# Patient Record
Sex: FEMALE | Race: WHITE | NOT HISPANIC OR LATINO | Employment: FULL TIME | URBAN - METROPOLITAN AREA
[De-identification: names, ages, dates, MRNs, and addresses within clinical notes are randomized per-mention and may not be internally consistent; named-entity substitution may affect disease eponyms.]

---

## 2023-11-22 ENCOUNTER — HOSPITAL ENCOUNTER (OUTPATIENT)
Dept: RADIOLOGY | Facility: HOSPITAL | Age: 53
Discharge: HOME | End: 2023-11-22
Payer: COMMERCIAL

## 2023-11-22 DIAGNOSIS — E78.2 MIXED HYPERLIPIDEMIA: ICD-10-CM

## 2023-11-22 PROCEDURE — 75571 CT HRT W/O DYE W/CA TEST: CPT

## 2023-12-21 ENCOUNTER — ANCILLARY PROCEDURE (OUTPATIENT)
Dept: RADIOLOGY | Facility: HOSPITAL | Age: 53
End: 2023-12-21
Payer: COMMERCIAL

## 2023-12-21 DIAGNOSIS — E04.1 NONTOXIC SINGLE THYROID NODULE: ICD-10-CM

## 2023-12-21 PROCEDURE — 76536 US EXAM OF HEAD AND NECK: CPT

## 2023-12-21 PROCEDURE — 76536 US EXAM OF HEAD AND NECK: CPT | Performed by: STUDENT IN AN ORGANIZED HEALTH CARE EDUCATION/TRAINING PROGRAM

## 2024-01-10 ENCOUNTER — HOSPITAL ENCOUNTER (OUTPATIENT)
Dept: RADIOLOGY | Facility: HOSPITAL | Age: 54
Discharge: HOME | End: 2024-01-10
Payer: COMMERCIAL

## 2024-01-10 DIAGNOSIS — Z12.31 ENCOUNTER FOR SCREENING MAMMOGRAM FOR MALIGNANT NEOPLASM OF BREAST: ICD-10-CM

## 2024-01-10 PROCEDURE — 77063 BREAST TOMOSYNTHESIS BI: CPT | Performed by: STUDENT IN AN ORGANIZED HEALTH CARE EDUCATION/TRAINING PROGRAM

## 2024-01-10 PROCEDURE — 77067 SCR MAMMO BI INCL CAD: CPT | Performed by: STUDENT IN AN ORGANIZED HEALTH CARE EDUCATION/TRAINING PROGRAM

## 2024-01-10 PROCEDURE — 77067 SCR MAMMO BI INCL CAD: CPT

## 2024-01-17 ENCOUNTER — OFFICE VISIT (OUTPATIENT)
Dept: OTOLARYNGOLOGY | Facility: CLINIC | Age: 54
End: 2024-01-17
Payer: COMMERCIAL

## 2024-01-17 VITALS — HEIGHT: 60 IN | WEIGHT: 124 LBS | BODY MASS INDEX: 24.35 KG/M2

## 2024-01-17 DIAGNOSIS — E04.1 THYROID NODULE: Primary | ICD-10-CM

## 2024-01-17 DIAGNOSIS — K21.9 LARYNGOPHARYNGEAL REFLUX: ICD-10-CM

## 2024-01-17 PROCEDURE — 99214 OFFICE O/P EST MOD 30 MIN: CPT | Performed by: OTOLARYNGOLOGY

## 2024-01-17 PROCEDURE — 1036F TOBACCO NON-USER: CPT | Performed by: OTOLARYNGOLOGY

## 2024-01-17 RX ORDER — DEXTROMETHORPHAN HYDROBROMIDE, GUAIFENESIN 5; 100 MG/5ML; MG/5ML
650 LIQUID ORAL EVERY 8 HOURS PRN
COMMUNITY

## 2024-01-17 RX ORDER — ACETAMINOPHEN, DIPHENHYDRAMINE HCL, PHENYLEPHRINE HCL 325; 25; 5 MG/1; MG/1; MG/1
TABLET ORAL
COMMUNITY

## 2024-01-17 RX ORDER — OMEPRAZOLE 40 MG/1
40 CAPSULE, DELAYED RELEASE ORAL
COMMUNITY

## 2024-01-17 NOTE — PROGRESS NOTES
HPI  Odsesa Thompson is a 54 y.o. female follow-up thyroid nodules and reflux.  She is taking her antacid and doing well with this.  She had ultrasound in July with stable nodules.  Recommended another ultrasound in 1 year.  She has no specific symptoms today.      History reviewed. No pertinent past medical history.         Medications:     Current Outpatient Medications:     acetaminophen (Tylenol 8 HOUR) 650 mg ER tablet, Take 1 tablet (650 mg) by mouth every 8 hours if needed for mild pain (1 - 3). Do not crush, chew, or split., Disp: , Rfl:     melatonin 10 mg tablet, Take by mouth., Disp: , Rfl:     omeprazole (PriLOSEC) 40 mg DR capsule, Take 1 capsule (40 mg) by mouth. Do not crush or chew., Disp: , Rfl:      Allergies:  Not on File     Physical Exam:  Last Recorded Vitals  Height 1.524 m (5'), weight 56.2 kg (124 lb).  General:     General appearance: Well-developed, well-nourished in no acute distress.       Voice:  normal       Head/face: Normal appearance; nontender to palpation     Facial nerve function: Normal and symmetric bilaterally.    Oral/oropharynx:     Oral vestibule: Normal labial and gingival mucosa     Tongue/floor of mouth: Normal without lesion     Oropharynx: Clear.  No lesions present of the hard/soft palate, posterior pharynx    Neck:     Neck: Normal appearance, trachea midline     Salivary glands: Normal to palpation bilaterally     Lymph nodes: No cervical lymphadenopathy to palpation     Thyroid: No thyromegaly.  No palpable nodules     Range of motion: Normal    Neurological:     Cortical functions: Alert and oriented x3, appropriate affect       Larynx/hypopharynx:     Laryngeal findings: Mirror exam inadequate or limited secondary to enlarged base of tongue and/or excessive gagging    Ear:     Ear canal: Normal bilaterally     Tympanic membrane: Intact and mobile bilaterally     Pinna: Normal bilaterally     Hearing:  Gross hearing assessment normal by voice    Nose:      Visualized using: Anterior rhinoscopy     Nasopharynx: Inadequate mirror exam secondary to gag, anatomy.       Nasal dorsum: Nontraumatic midline appearance     Septum: Midline     Inferior turbinates: Normally sized     Mucosa: Bilateral, pink, normal appearing       Assessment/Plan   We will repeat ultrasound in July.  Follow-up with her after that.  She will continue her antacid in the meantime         Kenneth Alanis MD

## 2024-07-10 ENCOUNTER — TELEPHONE (OUTPATIENT)
Dept: OTOLARYNGOLOGY | Facility: CLINIC | Age: 54
End: 2024-07-10
Payer: COMMERCIAL

## 2024-07-10 DIAGNOSIS — E04.1 THYROID NODULE: Primary | ICD-10-CM

## 2024-07-12 ENCOUNTER — HOSPITAL ENCOUNTER (OUTPATIENT)
Dept: RADIOLOGY | Facility: HOSPITAL | Age: 54
Discharge: HOME | End: 2024-07-12
Payer: COMMERCIAL

## 2024-07-12 DIAGNOSIS — E04.1 THYROID NODULE: ICD-10-CM

## 2024-07-12 PROCEDURE — 76536 US EXAM OF HEAD AND NECK: CPT

## 2024-07-17 ENCOUNTER — APPOINTMENT (OUTPATIENT)
Dept: OTOLARYNGOLOGY | Facility: CLINIC | Age: 54
End: 2024-07-17
Payer: COMMERCIAL

## 2024-07-17 VITALS — BODY MASS INDEX: 27.29 KG/M2 | TEMPERATURE: 96.8 F | WEIGHT: 139 LBS | HEIGHT: 60 IN

## 2024-07-17 DIAGNOSIS — K21.9 LARYNGOPHARYNGEAL REFLUX: ICD-10-CM

## 2024-07-17 DIAGNOSIS — E04.1 THYROID NODULE: Primary | ICD-10-CM

## 2024-07-17 PROCEDURE — 31575 DIAGNOSTIC LARYNGOSCOPY: CPT | Performed by: OTOLARYNGOLOGY

## 2024-07-17 PROCEDURE — 1036F TOBACCO NON-USER: CPT | Performed by: OTOLARYNGOLOGY

## 2024-07-17 PROCEDURE — 3008F BODY MASS INDEX DOCD: CPT | Performed by: OTOLARYNGOLOGY

## 2024-07-17 PROCEDURE — 99214 OFFICE O/P EST MOD 30 MIN: CPT | Performed by: OTOLARYNGOLOGY

## 2024-07-17 RX ORDER — MULTIVITAMIN/IRON/FOLIC ACID 18MG-0.4MG
1 TABLET ORAL DAILY
COMMUNITY

## 2024-07-17 RX ORDER — BISMUTH SUBSALICYLATE 262 MG
1 TABLET,CHEWABLE ORAL DAILY
COMMUNITY

## 2024-07-17 RX ORDER — CALCIUM CARBONATE 500(1250)
1 TABLET,CHEWABLE ORAL DAILY
COMMUNITY

## 2024-07-17 ASSESSMENT — PATIENT HEALTH QUESTIONNAIRE - PHQ9
2. FEELING DOWN, DEPRESSED OR HOPELESS: NOT AT ALL
SUM OF ALL RESPONSES TO PHQ9 QUESTIONS 1 & 2: 0
1. LITTLE INTEREST OR PLEASURE IN DOING THINGS: NOT AT ALL

## 2024-07-17 NOTE — PROGRESS NOTES
HPI  Odessa Thompson is a 54 y.o. female follow-up thyroid nodules and reflux.  She is taking her antacid and doing well with this.  She has some occasional breakthrough symptoms.  She had an ultrasound done July 10 demonstrating multiple nodules.  There was some mild growth in her right inferior nodule.  It remains less than 1.5 cm.  TI RADS 4.        SIZE:  RIGHT LOBE:  5.4 x 1.6 x 2.5 cm  LEFT LOBE:  5.3 x 1.4 x 1.9 cm  ISTHMUS:  5 mm in AP diameter      NODULES: (Please note, assessment and description of nodules is per  TI-RADS criteria. Up to 4 total nodules described, which includes  largest and/or most clinically significant based on morphology.) It  is noted that some spongiform and/or cystic nodules may not be  specifically described and are TR category 1 (benign).      NODULE #: 1.      Location: Right thyroid lobe inferior aspect  Size: 1.3 x 0.9 x 0.8 cm  Composition:  Solid or almost completely solid (2)  Echogenicity:  Hyperechoic or isoechoic (1)  Shape:  Wider-than-tall (0)  Margin:  Smooth (0)  Echogenic Foci:  None or Large comet-tail artifacts (0)      If present previously:  Significant change in size (>/= 20% diameter increase in at least  two dimensions and minimal increase of 2mm?): No significant change.  Change in features or ACR TI-RADS category: No change.      The total score of this nodule is 3 points, corresponding to a  TI-RADS category  3; (3 points) Mildly suspicious.      NODULE #: 2.      Location: Right thyroid lobe inferior aspect  Size: 1.2 x 0.7 x 1 cm compared to 0.8 x 0.6 x 0.8 cm on prior  Composition: Solid or almost completely solid (2)  Echogenicity: Hypoechoic (2)  Shape:  Wider-than-tall (0)  Margin:  Smooth (0)  Echogenic Foci: None or Large comet-tail artifacts (0)      If present previously:  Significant change in size (>/= 20% diameter increase in at least  two dimensions and minimal increase of 2mm?): Yes, increased. Change  in features or ACR TI-RADS category:  No change.      The total score of this nodule is 4 points, corresponding to a  TI-RADS category 4; (4-6 points) Moderately suspicious.      NODULE #: 3.      Location: Left thyroid lobe mid aspect  Size: 1 x 0.9 x 1.1 cm compared to 1 x 0.7 x 0.9 cm on prior  Composition: Solid or almost completely solid (2)  Echogenicity: Hyperechoic or isoechoic (1)  Shape:  Wider-than-tall (0)  Margin:  Smooth (0)  Echogenic Foci: None or Large comet-tail artifacts (0)      If present previously:  Significant change in size (>/= 20% diameter increase in at least  two dimensions and minimal increase of 2mm?): No significant change.  Change in features or ACR TI-RADS category: No change.      The total score of this nodule is 3 points, corresponding to a  TI-RADS category 3; (3 points) Mildly suspicious.      NODULE #: 4.      Location: Thyroid isthmus  Size: 0.9 x 0.5 x 1 cm compared to 0.8 x 0.4 x 0.7 cm on prior.  Composition: Solid or almost completely solid (2)  Echogenicity: Hypoechoic (2)  Shape:  Wider-than-tall (0)  Margin:  Smooth (0)  Echogenic Foci: None or Large comet-tail artifacts (0)      If present previously:  Significant change in size (>/= 20% diameter increase in at least  two dimensions and minimal increase of 2mm?): No significant change.  Change in features or ACR TI-RADS category: No change.      The total score of this nodule is 4 points, corresponding to a  TI-RADS category 4; (4-6 points) Moderately suspicious.      IMPRESSION:  1. Enlarged heterogeneous thyroid containing multiple varying sized  nodules as detailed most compatible with multinodular goiter.  Continued surveillance may be considered.      History reviewed. No pertinent past medical history.         Medications:     Current Outpatient Medications:     acetaminophen (Tylenol 8 HOUR) 650 mg ER tablet, Take 1 tablet (650 mg) by mouth every 8 hours if needed for mild pain (1 - 3). Do not crush, chew, or split., Disp: , Rfl:     b complex 0.4  mg tablet, Take 1 tablet by mouth once daily., Disp: , Rfl:     melatonin 10 mg tablet, Take by mouth., Disp: , Rfl:     multivitamin tablet, Take 1 tablet by mouth once daily., Disp: , Rfl:     omeprazole (PriLOSEC) 40 mg DR capsule, Take 1 capsule (40 mg) by mouth. Do not crush or chew., Disp: , Rfl:     calcium carbonate 500 mg calcium (1,250 mg) chewable tablet, Chew 1 tablet (1,250 mg) once daily., Disp: , Rfl:      Allergies:  Allergies   Allergen Reactions    Adhesive Tape-Silicones Swelling        Physical Exam:  Last Recorded Vitals  Temperature 36 °C (96.8 °F), height 1.524 m (5'), weight 63 kg (139 lb).  General:     General appearance: Well-developed, well-nourished in no acute distress.       Voice:  normal       Head/face: Normal appearance; nontender to palpation     Facial nerve function: Normal and symmetric bilaterally.    Oral/oropharynx:     Oral vestibule: Normal labial and gingival mucosa     Tongue/floor of mouth: Normal without lesion     Oropharynx: Clear.  No lesions present of the hard/soft palate, posterior pharynx    Neck:     Neck: Normal appearance, trachea midline     Salivary glands: Normal to palpation bilaterally     Lymph nodes: No cervical lymphadenopathy to palpation     Thyroid: No thyromegaly.  No palpable nodules     Range of motion: Normal    Neurological:     Cortical functions: Alert and oriented x3, appropriate affect       Larynx/hypopharynx:     Laryngeal findings: Mirror exam inadequate or limited secondary to enlarged base of tongue and/or excessive gagging.  Flexible laryngoscopy performed secondary to inadequate mirror examination.  Verbal informed consent the flexible laryngoscope was passed through the nasal cavity.  Normal epiglottis, aryepiglottic folds, arytenoids, false vocal cords, true vocal cords.  Normal vocal cord mobility bilaterally was identified.  No mucosal masses or lesions.    Nasopharynx:     Nasopharynx: Normal mild growth of one of the thyroid  nodules.  It remains fairly small.  Multinodular    Ear:     Ear canal: Normal bilaterally     Tympanic membrane: Intact and mobile bilaterally     Pinna: Normal bilaterally     Hearing:  Gross hearing assessment normal by voice    Nose:     Visualized using: Anterior rhinoscopy     Nasopharynx: Inadequate mirror exam secondary to gag, anatomy.       Nasal dorsum: Nontraumatic midline appearance     Septum: Midline     Inferior turbinates: Normally sized     Mucosa: Bilateral, pink, normal appearing       Assessment/Plan   Multinodular disease.  Mild growth of the nodule as above but remains fairly small.  We will continue to monitor.  Recheck ultrasound 1 year.  Recommend continue antacid.  Reassured flexible laryngoscopy without lesion         Kenneth Alanis MD

## 2024-12-03 ENCOUNTER — HOSPITAL ENCOUNTER (OUTPATIENT)
Dept: RADIOLOGY | Facility: HOSPITAL | Age: 54
Discharge: HOME | End: 2024-12-03
Payer: COMMERCIAL

## 2024-12-03 DIAGNOSIS — M25.562 PAIN IN LEFT KNEE: ICD-10-CM

## 2024-12-03 PROCEDURE — 73562 X-RAY EXAM OF KNEE 3: CPT | Mod: LT

## 2024-12-03 PROCEDURE — 73562 X-RAY EXAM OF KNEE 3: CPT | Mod: LEFT SIDE | Performed by: RADIOLOGY

## 2024-12-06 ENCOUNTER — OFFICE VISIT (OUTPATIENT)
Dept: ORTHOPEDIC SURGERY | Facility: CLINIC | Age: 54
End: 2024-12-06
Payer: COMMERCIAL

## 2024-12-06 VITALS — HEIGHT: 60 IN | WEIGHT: 138 LBS | BODY MASS INDEX: 27.09 KG/M2

## 2024-12-06 DIAGNOSIS — M25.562 PAIN IN LEFT KNEE: ICD-10-CM

## 2024-12-06 DIAGNOSIS — M17.12 UNILATERAL PRIMARY OSTEOARTHRITIS, LEFT KNEE: ICD-10-CM

## 2024-12-06 DIAGNOSIS — M21.162 VARUS DEFORMITY, NOT ELSEWHERE CLASSIFIED, LEFT KNEE: ICD-10-CM

## 2024-12-06 PROCEDURE — 3008F BODY MASS INDEX DOCD: CPT | Performed by: ORTHOPAEDIC SURGERY

## 2024-12-06 PROCEDURE — 20610 DRAIN/INJ JOINT/BURSA W/O US: CPT | Performed by: ORTHOPAEDIC SURGERY

## 2024-12-06 PROCEDURE — 99204 OFFICE O/P NEW MOD 45 MIN: CPT | Performed by: ORTHOPAEDIC SURGERY

## 2024-12-06 PROCEDURE — 1036F TOBACCO NON-USER: CPT | Performed by: ORTHOPAEDIC SURGERY

## 2024-12-06 RX ORDER — TRIAMCINOLONE ACETONIDE 40 MG/ML
1 INJECTION, SUSPENSION INTRA-ARTICULAR; INTRAMUSCULAR
Status: COMPLETED | OUTPATIENT
Start: 2024-12-06 | End: 2024-12-06

## 2024-12-06 ASSESSMENT — PAIN - FUNCTIONAL ASSESSMENT: PAIN_FUNCTIONAL_ASSESSMENT: 0-10

## 2024-12-06 ASSESSMENT — PAIN SCALES - GENERAL: PAINLEVEL_OUTOF10: 8

## 2024-12-06 NOTE — LETTER
December 6, 2024     Deb Raymond MD  [address] [place] [id number]    Patient: Odessa Thompson   YOB: 1970   Date of Visit: 12/6/2024       Dear Dr. Deb Raymond MD:    Thank you for referring Odessa Thompson to me for evaluation. Below are my notes for this consultation.  If you have questions, please do not hesitate to call me. I look forward to following your patient along with you.       Sincerely,     Lowell Nicolas MD      CC: Deb Raymond MD  ______________________________________________________________________________________    This is a consultation from Dr. Deb Raymond MD for   Chief Complaint   Patient presents with   • Left Knee - Pain       This is a 54 y.o. female who presents for evaluation of left knee pain.  Patient states has had left knee pain for years, this is a chronic issue but is recently exacerbated.  She complains of sharp pain over the medial knee worse with walking proving with rest.  No numbness or tingling no fevers or chills no shooting pain down the leg.  She has never had surgery or injections.  She does take over-the-counter anti-inflammatories but they are not helping much.  She is tried ice and topicals.    Physical Exam    There has been no interval change in this patient's past medical, surgical, medications, allergies, family history or social history since the most recent visit to a provider within our department. 14 point review of systems was performed, reviewed, and negative except for pertinent positives documented in the history of present illness.     Constitutional: well developed, well nourished female in no acute distress  Psychiatric: normal mood, appropriate affect  Eyes: sclera anicteric  HENT: normocephalic/atraumatic  CV: regular rate and rhythm   Respiratory: non labored breathing  Integumentary: no rash  Neurological: moves all extremities    Left knee exam: skin intact no lacerations or abrations. no effusion.   Tender medial joint line. negative log roll negative patellar grind. ROM 0-120. stable to varus and valgus stress at 0 and 30 degrees. negative lachman negative posterior drawer negative zeus. 5/5 ehl/fhl/gs/ta. silt s/s/sp/dp/t. 2+ dp/pt        Xrays were ordered by me, they were reviewed and independently interpreted by me today, they show severe degenerative changes bone-on-bone arthritis varus deformity left knee    L Inj/Asp: L knee on 12/6/2024 10:23 AM  Indications: pain and joint swelling  Details: 22 G needle, anterolateral approach  Medications: 1 mL triamcinolone acetonide 40 mg/mL    Discussion:  I discussed the conservative treatment options for knee osteoarthritis including but not limited to physical therapy, oral NSAIDS, activity and lifestyle modification, and corticosteroid injections. Pt has elected to undergo a cortisone injection today. I have explained the risk and benefits of an injection including the possibility of joint infection, bleeding, damage to cartilage, allergic reaction. Patient verbalized understanding and gave verbal consent wishes to proceed with a intra-articular cortisone injection for their knee.    Procedure:  After discussing the risk and benefits of the procedure, we proceeded with an intra-articular left knee injection. We discussed the risks and benefits and potential morbidity related to the treatment, and to the prescription medication administered in the injection    With the patient's informed verbal consent, the left knee was prepped in standard sterile fashion with Chlorhexidine. The skin was then anesthetized with ethyl chloride spray and cleaned again with Chlorhexidine. The knee was then apirated/injected with a prefilled 20-gauge syringe of 40 mg Kenalog + 4 ml Lidocaine using the lateral approach without complications.  The patient tolerated this well and felt immediate initial relief of symptoms. A bandaid was applied and the patient ambulated out of the  clinic on ther own accord without difficulty. Patient was instructed to avoid physical activity for 24-48 hours to prevent the knees from swelling and may ice the knees as tolerated. Patient should contact the office if any signs of of infection appear: redness, fever, chills, drainage, swelling or warmth to the knees.  Pt understands that the injections can be repeated no sooner than 3 months.  Procedure, treatment alternatives, risks and benefits explained, specific risks discussed. Consent was given by the patient. Immediately prior to procedure a time out was called to verify the correct patient, procedure, equipment, support staff and site/side marked as required. Patient was prepped and draped in the usual sterile fashion.             Impression/Plan: This is a 54 y.o. female with severe left knee arthritis.  I had an in depth discussion with the patient regarding treatment options for arthritis and their relative risks and benefits. We reviewed surgical and nonsurgical option for treatment. Treatments include anti inflammatory medications, physical therapy, weight loss, activity modification, use of assistive devices, injection therapies. We discussed current prescriptions and risks and benefits of continuation of prescription medication as apporpriate. We discussed that arthritis is often progressive over time, an in end stage arthritis surgical interventions can be considered, including arthroplasty. All questions were answered and the patient voiced their understanding.  May be a good candidate for arthroplasty in the future, I will see her back as needed    BMI Readings from Last 1 Encounters:   12/06/24 26.95 kg/m²      Lab Results   Component Value Date    CREATININE 0.7 06/03/2023     Tobacco Use: Low Risk  (12/6/2024)    Patient History    • Smoking Tobacco Use: Never    • Smokeless Tobacco Use: Never    • Passive Exposure: Not on file      Computed MELD 3.0 unavailable. One or more values for this  "score either were not found within the given timeframe or did not fit some other criterion.  Computed MELD-Na unavailable. One or more values for this score either were not found within the given timeframe or did not fit some other criterion.       Lab Results   Component Value Date    HGBA1C 6.7 (H) 02/27/2022     No results found for: \"STAPHMRSASCR\"  "

## 2024-12-06 NOTE — PROGRESS NOTES
This is a consultation from Dr. Deb Raymond MD for   Chief Complaint   Patient presents with    Left Knee - Pain       This is a 54 y.o. female who presents for evaluation of left knee pain.  Patient states has had left knee pain for years, this is a chronic issue but is recently exacerbated.  She complains of sharp pain over the medial knee worse with walking proving with rest.  No numbness or tingling no fevers or chills no shooting pain down the leg.  She has never had surgery or injections.  She does take over-the-counter anti-inflammatories but they are not helping much.  She is tried ice and topicals.    Physical Exam    There has been no interval change in this patient's past medical, surgical, medications, allergies, family history or social history since the most recent visit to a provider within our department. 14 point review of systems was performed, reviewed, and negative except for pertinent positives documented in the history of present illness.     Constitutional: well developed, well nourished female in no acute distress  Psychiatric: normal mood, appropriate affect  Eyes: sclera anicteric  HENT: normocephalic/atraumatic  CV: regular rate and rhythm   Respiratory: non labored breathing  Integumentary: no rash  Neurological: moves all extremities    Left knee exam: skin intact no lacerations or abrations. no effusion.  Tender medial joint line. negative log roll negative patellar grind. ROM 0-120. stable to varus and valgus stress at 0 and 30 degrees. negative lachman negative posterior drawer negative zeus. 5/5 ehl/fhl/gs/ta. silt s/s/sp/dp/t. 2+ dp/pt        Xrays were ordered by me, they were reviewed and independently interpreted by me today, they show severe degenerative changes bone-on-bone arthritis varus deformity left knee    L Inj/Asp: L knee on 12/6/2024 10:23 AM  Indications: pain and joint swelling  Details: 22 G needle, anterolateral approach  Medications: 1 mL triamcinolone  acetonide 40 mg/mL    Discussion:  I discussed the conservative treatment options for knee osteoarthritis including but not limited to physical therapy, oral NSAIDS, activity and lifestyle modification, and corticosteroid injections. Pt has elected to undergo a cortisone injection today. I have explained the risk and benefits of an injection including the possibility of joint infection, bleeding, damage to cartilage, allergic reaction. Patient verbalized understanding and gave verbal consent wishes to proceed with a intra-articular cortisone injection for their knee.    Procedure:  After discussing the risk and benefits of the procedure, we proceeded with an intra-articular left knee injection. We discussed the risks and benefits and potential morbidity related to the treatment, and to the prescription medication administered in the injection    With the patient's informed verbal consent, the left knee was prepped in standard sterile fashion with Chlorhexidine. The skin was then anesthetized with ethyl chloride spray and cleaned again with Chlorhexidine. The knee was then apirated/injected with a prefilled 20-gauge syringe of 40 mg Kenalog + 4 ml Lidocaine using the lateral approach without complications.  The patient tolerated this well and felt immediate initial relief of symptoms. A bandaid was applied and the patient ambulated out of the clinic on ther own accord without difficulty. Patient was instructed to avoid physical activity for 24-48 hours to prevent the knees from swelling and may ice the knees as tolerated. Patient should contact the office if any signs of of infection appear: redness, fever, chills, drainage, swelling or warmth to the knees.  Pt understands that the injections can be repeated no sooner than 3 months.  Procedure, treatment alternatives, risks and benefits explained, specific risks discussed. Consent was given by the patient. Immediately prior to procedure a time out was called to  "verify the correct patient, procedure, equipment, support staff and site/side marked as required. Patient was prepped and draped in the usual sterile fashion.             Impression/Plan: This is a 54 y.o. female with severe left knee arthritis.  I had an in depth discussion with the patient regarding treatment options for arthritis and their relative risks and benefits. We reviewed surgical and nonsurgical option for treatment. Treatments include anti inflammatory medications, physical therapy, weight loss, activity modification, use of assistive devices, injection therapies. We discussed current prescriptions and risks and benefits of continuation of prescription medication as apporpriate. We discussed that arthritis is often progressive over time, an in end stage arthritis surgical interventions can be considered, including arthroplasty. All questions were answered and the patient voiced their understanding.  May be a good candidate for arthroplasty in the future, I will see her back as needed    BMI Readings from Last 1 Encounters:   12/06/24 26.95 kg/m²      Lab Results   Component Value Date    CREATININE 0.7 06/03/2023     Tobacco Use: Low Risk  (12/6/2024)    Patient History     Smoking Tobacco Use: Never     Smokeless Tobacco Use: Never     Passive Exposure: Not on file      Computed MELD 3.0 unavailable. One or more values for this score either were not found within the given timeframe or did not fit some other criterion.  Computed MELD-Na unavailable. One or more values for this score either were not found within the given timeframe or did not fit some other criterion.       Lab Results   Component Value Date    HGBA1C 6.7 (H) 02/27/2022     No results found for: \"STAPHMRSASCR\"  "

## 2024-12-10 ENCOUNTER — APPOINTMENT (OUTPATIENT)
Dept: ORTHOPEDIC SURGERY | Facility: CLINIC | Age: 54
End: 2024-12-10
Payer: COMMERCIAL

## 2024-12-16 ENCOUNTER — APPOINTMENT (OUTPATIENT)
Dept: RADIOLOGY | Facility: HOSPITAL | Age: 54
End: 2024-12-16
Payer: COMMERCIAL

## 2024-12-16 ENCOUNTER — APPOINTMENT (OUTPATIENT)
Dept: CARDIOLOGY | Facility: HOSPITAL | Age: 54
End: 2024-12-16
Payer: COMMERCIAL

## 2024-12-16 ENCOUNTER — HOSPITAL ENCOUNTER (EMERGENCY)
Facility: HOSPITAL | Age: 54
Discharge: HOME | End: 2024-12-17
Attending: STUDENT IN AN ORGANIZED HEALTH CARE EDUCATION/TRAINING PROGRAM
Payer: COMMERCIAL

## 2024-12-16 DIAGNOSIS — R10.30 LOWER ABDOMINAL PAIN: Primary | ICD-10-CM

## 2024-12-16 LAB
ALBUMIN SERPL BCP-MCNC: 3.7 G/DL (ref 3.4–5)
ALP SERPL-CCNC: 80 U/L (ref 33–110)
ALT SERPL W P-5'-P-CCNC: 10 U/L (ref 7–45)
ANION GAP SERPL CALC-SCNC: 10 MMOL/L (ref 10–20)
AST SERPL W P-5'-P-CCNC: 13 U/L (ref 9–39)
BASOPHILS # BLD AUTO: 0.13 X10*3/UL (ref 0–0.1)
BASOPHILS NFR BLD AUTO: 1.2 %
BILIRUB SERPL-MCNC: 0.4 MG/DL (ref 0–1.2)
BUN SERPL-MCNC: 18 MG/DL (ref 6–23)
CALCIUM SERPL-MCNC: 8.2 MG/DL (ref 8.6–10.3)
CHLORIDE SERPL-SCNC: 104 MMOL/L (ref 98–107)
CO2 SERPL-SCNC: 27 MMOL/L (ref 21–32)
CREAT SERPL-MCNC: 0.65 MG/DL (ref 0.5–1.05)
EGFRCR SERPLBLD CKD-EPI 2021: >90 ML/MIN/1.73M*2
EOSINOPHIL # BLD AUTO: 0.23 X10*3/UL (ref 0–0.7)
EOSINOPHIL NFR BLD AUTO: 2.2 %
ERYTHROCYTE [DISTWIDTH] IN BLOOD BY AUTOMATED COUNT: 12.9 % (ref 11.5–14.5)
GLUCOSE SERPL-MCNC: 99 MG/DL (ref 74–99)
HCT VFR BLD AUTO: 41 % (ref 36–46)
HGB BLD-MCNC: 13.5 G/DL (ref 12–16)
IMM GRANULOCYTES # BLD AUTO: 0.03 X10*3/UL (ref 0–0.7)
IMM GRANULOCYTES NFR BLD AUTO: 0.3 % (ref 0–0.9)
LIPASE SERPL-CCNC: 47 U/L (ref 9–82)
LYMPHOCYTES # BLD AUTO: 2.43 X10*3/UL (ref 1.2–4.8)
LYMPHOCYTES NFR BLD AUTO: 22.8 %
MCH RBC QN AUTO: 30.5 PG (ref 26–34)
MCHC RBC AUTO-ENTMCNC: 32.9 G/DL (ref 32–36)
MCV RBC AUTO: 93 FL (ref 80–100)
MONOCYTES # BLD AUTO: 0.46 X10*3/UL (ref 0.1–1)
MONOCYTES NFR BLD AUTO: 4.3 %
NEUTROPHILS # BLD AUTO: 7.4 X10*3/UL (ref 1.2–7.7)
NEUTROPHILS NFR BLD AUTO: 69.2 %
NRBC BLD-RTO: 0 /100 WBCS (ref 0–0)
PLATELET # BLD AUTO: 440 X10*3/UL (ref 150–450)
POTASSIUM SERPL-SCNC: 4.2 MMOL/L (ref 3.5–5.3)
PROT SERPL-MCNC: 6.2 G/DL (ref 6.4–8.2)
RBC # BLD AUTO: 4.42 X10*6/UL (ref 4–5.2)
SODIUM SERPL-SCNC: 137 MMOL/L (ref 136–145)
WBC # BLD AUTO: 10.7 X10*3/UL (ref 4.4–11.3)

## 2024-12-16 PROCEDURE — 36415 COLL VENOUS BLD VENIPUNCTURE: CPT | Performed by: STUDENT IN AN ORGANIZED HEALTH CARE EDUCATION/TRAINING PROGRAM

## 2024-12-16 PROCEDURE — 74177 CT ABD & PELVIS W/CONTRAST: CPT

## 2024-12-16 PROCEDURE — 99285 EMERGENCY DEPT VISIT HI MDM: CPT | Mod: 25 | Performed by: STUDENT IN AN ORGANIZED HEALTH CARE EDUCATION/TRAINING PROGRAM

## 2024-12-16 PROCEDURE — 85025 COMPLETE CBC W/AUTO DIFF WBC: CPT | Performed by: STUDENT IN AN ORGANIZED HEALTH CARE EDUCATION/TRAINING PROGRAM

## 2024-12-16 PROCEDURE — 2550000001 HC RX 255 CONTRASTS: Performed by: STUDENT IN AN ORGANIZED HEALTH CARE EDUCATION/TRAINING PROGRAM

## 2024-12-16 PROCEDURE — 80053 COMPREHEN METABOLIC PANEL: CPT | Performed by: STUDENT IN AN ORGANIZED HEALTH CARE EDUCATION/TRAINING PROGRAM

## 2024-12-16 PROCEDURE — 83690 ASSAY OF LIPASE: CPT | Performed by: STUDENT IN AN ORGANIZED HEALTH CARE EDUCATION/TRAINING PROGRAM

## 2024-12-16 PROCEDURE — 96374 THER/PROPH/DIAG INJ IV PUSH: CPT

## 2024-12-16 PROCEDURE — 81001 URINALYSIS AUTO W/SCOPE: CPT | Performed by: STUDENT IN AN ORGANIZED HEALTH CARE EDUCATION/TRAINING PROGRAM

## 2024-12-16 PROCEDURE — 74177 CT ABD & PELVIS W/CONTRAST: CPT | Performed by: RADIOLOGY

## 2024-12-16 PROCEDURE — 2500000001 HC RX 250 WO HCPCS SELF ADMINISTERED DRUGS (ALT 637 FOR MEDICARE OP): Performed by: STUDENT IN AN ORGANIZED HEALTH CARE EDUCATION/TRAINING PROGRAM

## 2024-12-16 PROCEDURE — 2500000004 HC RX 250 GENERAL PHARMACY W/ HCPCS (ALT 636 FOR OP/ED): Performed by: STUDENT IN AN ORGANIZED HEALTH CARE EDUCATION/TRAINING PROGRAM

## 2024-12-16 PROCEDURE — 93005 ELECTROCARDIOGRAM TRACING: CPT

## 2024-12-16 PROCEDURE — 85025 COMPLETE CBC W/AUTO DIFF WBC: CPT | Performed by: EMERGENCY MEDICINE

## 2024-12-16 PROCEDURE — 36415 COLL VENOUS BLD VENIPUNCTURE: CPT | Performed by: EMERGENCY MEDICINE

## 2024-12-16 RX ORDER — DICYCLOMINE HYDROCHLORIDE 10 MG/1
20 CAPSULE ORAL ONCE
Status: COMPLETED | OUTPATIENT
Start: 2024-12-16 | End: 2024-12-16

## 2024-12-16 RX ORDER — ONDANSETRON HYDROCHLORIDE 2 MG/ML
4 INJECTION, SOLUTION INTRAVENOUS ONCE
Status: COMPLETED | OUTPATIENT
Start: 2024-12-16 | End: 2024-12-16

## 2024-12-16 ASSESSMENT — COLUMBIA-SUICIDE SEVERITY RATING SCALE - C-SSRS
2. HAVE YOU ACTUALLY HAD ANY THOUGHTS OF KILLING YOURSELF?: NO
1. IN THE PAST MONTH, HAVE YOU WISHED YOU WERE DEAD OR WISHED YOU COULD GO TO SLEEP AND NOT WAKE UP?: NO
6. HAVE YOU EVER DONE ANYTHING, STARTED TO DO ANYTHING, OR PREPARED TO DO ANYTHING TO END YOUR LIFE?: NO

## 2024-12-16 ASSESSMENT — PAIN DESCRIPTION - LOCATION: LOCATION: ABDOMEN

## 2024-12-16 ASSESSMENT — PAIN - FUNCTIONAL ASSESSMENT: PAIN_FUNCTIONAL_ASSESSMENT: 0-10

## 2024-12-16 ASSESSMENT — PAIN SCALES - GENERAL: PAINLEVEL_OUTOF10: 9

## 2024-12-16 ASSESSMENT — PAIN DESCRIPTION - PAIN TYPE: TYPE: ACUTE PAIN

## 2024-12-17 VITALS
TEMPERATURE: 98.8 F | BODY MASS INDEX: 27.48 KG/M2 | DIASTOLIC BLOOD PRESSURE: 72 MMHG | HEART RATE: 74 BPM | HEIGHT: 60 IN | OXYGEN SATURATION: 98 % | SYSTOLIC BLOOD PRESSURE: 121 MMHG | WEIGHT: 140 LBS | RESPIRATION RATE: 18 BRPM

## 2024-12-17 LAB
AMORPH CRY #/AREA UR COMP ASSIST: ABNORMAL /HPF
APPEARANCE UR: CLEAR
ATRIAL RATE: 86 BPM
BILIRUB UR STRIP.AUTO-MCNC: NEGATIVE MG/DL
COLOR UR: YELLOW
GLUCOSE UR STRIP.AUTO-MCNC: NEGATIVE MG/DL
HOLD SPECIMEN: NORMAL
KETONES UR STRIP.AUTO-MCNC: ABNORMAL MG/DL
LEUKOCYTE ESTERASE UR QL STRIP.AUTO: NEGATIVE
MUCOUS THREADS #/AREA URNS AUTO: ABNORMAL /LPF
NITRITE UR QL STRIP.AUTO: NEGATIVE
P AXIS: 46 DEGREES
P OFFSET: 197 MS
P ONSET: 157 MS
PH UR STRIP.AUTO: 6.5 [PH]
PR INTERVAL: 126 MS
PROT UR STRIP.AUTO-MCNC: ABNORMAL MG/DL
Q ONSET: 220 MS
QRS COUNT: 14 BEATS
QRS DURATION: 78 MS
QT INTERVAL: 348 MS
QTC CALCULATION(BAZETT): 416 MS
QTC FREDERICIA: 392 MS
R AXIS: 31 DEGREES
RBC # UR STRIP.AUTO: NEGATIVE /UL
RBC #/AREA URNS AUTO: ABNORMAL /HPF
SP GR UR STRIP.AUTO: 1.02
SQUAMOUS #/AREA URNS AUTO: ABNORMAL /HPF
T AXIS: 43 DEGREES
T OFFSET: 394 MS
UROBILINOGEN UR STRIP.AUTO-MCNC: ABNORMAL MG/DL
VENTRICULAR RATE: 86 BPM
WBC #/AREA URNS AUTO: ABNORMAL /HPF
YEAST BUDDING #/AREA UR COMP ASSIST: PRESENT /HPF

## 2024-12-17 RX ORDER — ONDANSETRON 4 MG/1
4 TABLET, ORALLY DISINTEGRATING ORAL EVERY 8 HOURS PRN
Qty: 20 TABLET | Refills: 0 | Status: SHIPPED | OUTPATIENT
Start: 2024-12-17 | End: 2024-12-24

## 2024-12-17 RX ORDER — DICYCLOMINE HYDROCHLORIDE 20 MG/1
20 TABLET ORAL 4 TIMES DAILY PRN
Qty: 20 TABLET | Refills: 0 | Status: SHIPPED | OUTPATIENT
Start: 2024-12-17 | End: 2024-12-27

## 2024-12-17 RX ORDER — MORPHINE SULFATE 4 MG/ML
4 INJECTION INTRAVENOUS ONCE
Status: DISCONTINUED | OUTPATIENT
Start: 2024-12-17 | End: 2024-12-17 | Stop reason: HOSPADM

## 2024-12-17 ASSESSMENT — PAIN DESCRIPTION - LOCATION: LOCATION: ABDOMEN

## 2024-12-17 ASSESSMENT — PAIN SCALES - GENERAL: PAINLEVEL_OUTOF10: 9

## 2024-12-17 ASSESSMENT — PAIN DESCRIPTION - PAIN TYPE: TYPE: ACUTE PAIN

## 2024-12-17 NOTE — ED TRIAGE NOTES
Pt BIB POV from home w/ c/o 5 days of midline and left sided abdominal pain, nauseated but denies vomiting. Back pain at times. Chest pain at times also. Denies nausea currently. Denies blood in bm's, denies diarrhea. Denies cough, st, fever, chills. Denies cp at this current time.

## 2024-12-17 NOTE — ED PROVIDER NOTES
History/Exam limitations: none  HPI was provided by patient    HPI:    Chief Complaint   Patient presents with    Abdominal Pain        Odessa Thompson is a 54 y.o. female with history of gastric bypass presenting with chief complaint of  nausea and abdominal pain.  Denies any chest pain.  Abdominal pain is periumbilical slightly left upper abdomen described as a punching sharp pain she is unsure of anything that brought it on and will go away on its own.  Episodic in nature.  Slightly goes into her back but no back pain presently.  No changes in bowel or bladder.  They deny recent travel, suspicious food intake, similar symptoms found amongst close contacts or recent antibiotic use.       ROS:  All other review of systems are negative except as noted above and HPI or ROS.   CONSTITUTIONAL: fever, chills  ENT: sore throat, congestion, rhinorrhea  CARDIOVASCULAR: chest pain, palpitations, swelling  RESPIRATORY: cough, wheeze, shortness of breath  GI: nausea, vomiting, diarrhea, abdominal pain,  black or tarry stools, constipation  GENITOURINARY: dysuria, hematuria, frequency  MUSCULOSKELETAL: deformity, neck pain  SKIN: rash, lesion  NEUROLOGIC: headache, numbness, focal weakness  NOTES: All systems reviewed, negative except as described above     Physical Exam:  GENERAL: Alert, oriented , cooperative,  in no acute distress.  HEAD: normocephalic, atraumatic  SKIN:  dry skin, no lesions.  EYES: PERRL, EOMs intact,  Conjunctiva pink with no erythema or exudates. No scleral icterus.   ENT: No external deformities. Nares patent, mucus membranes moist.  Pharynx clear, uvula midline.   NECK: Supple, without meningismus. Trachea at midline. No lymphadenopathy.  PULMONARY: Clear bilaterally. No crackles, rhonchi, wheezing.  No respiratory distress.  No work of breathing.  CARDIAC: Regular rate and regular rhythm.  Pulses 2+ in radials and dorsal pedal pulses bilaterally.  No murmur, rub, gallop.  No edema.  ABDOMEN: Soft,  left mid abdominal tender to palpation, active bowel sounds.  No palpable organomegaly.  No rebound or guarding.  No CVA tenderness.  No pulsatile masses.  Negative Sol sign, negative McBurney point  MUSCULOSKELETAL: Full range of motion throughout, no deformity.   NEUROLOGICAL:  no focal neuro deficits.  Strength 5 out of 5 throughout bilateral upper and lower extremities neurovascular intact in bilateral upper and lower extremities  PSYCHIATRIC: Appropriate mood and affect. Calm.    Medical Decision Making:     The patient presented for evaluation of abdominal pain.  Differential included but not limited to UTI, bowel obstruction, appendicitis, constipation, viral illness, pancreatitis, cholecystitis.  Patient was given Bentyl and Zofran for symptomatic relief.  Imaging studies if performed were independently reviewed and interpreted by myself and confirmed by radiologist.         External Records Reviewed: I reviewed recent and relevant outside records      On reevaluation abdomen is benign and nonsurgical.  Patient feels safe for discharge home.  Patient nontoxic-appearing on reexamination.  Vital signs are stable.  The patient and caregiver are in agreement with the plan and given instructions to follow up with their PCP.            I discussed the differential, results and plan with the patient and/or family/friend/caregiver if present.       I emphasized the importance of follow-up with the physician I referred them to in the timeframe recommended.  I explained reasons for the patient to return to the Emergency Department. Additional verbal discharge instructions were also given and discussed with the patient to supplement those generated by the EMR. We also discussed medications that were prescribed (if any) including common side effects and interactions. The patient was advised to abstain from driving, operating heavy machinery or making significant decisions while taking medications such as opiates and  muscle relaxers that may impair this. All questions were addressed.  They understand return precautions and discharge instructions. The patient and/or family/friend/caregiver expressed understanding.        History reviewed. No pertinent past medical history.   Social History     Socioeconomic History    Marital status: Single   Tobacco Use    Smoking status: Never    Smokeless tobacco: Never   Substance and Sexual Activity    Alcohol use: Not Currently    Drug use: Never     Social Drivers of Health     Financial Resource Strain: Not on File (2021)    Received from [facility]    Financial Resource Strain     Financial Resource Strain: 0   Recent Concern: Financial Resource Strain - At Risk (2021)    Received from [facility]    Financial Resource Strain     Financial Resource Strain: 2   Food Insecurity: Not on File (2024)    Received from [facility]    Food Insecurity     Food: 0   Transportation Needs: Not on File (2021)    Received from [facility]    Transportation Needs     Transportation: 0   Physical Activity: Not on File (2021)    Received from [facility]    Physical Activity     Physical Activity: 0   Stress: Not on File (2021)    Received from [facility]    Stress     Stress: 0   Recent Concern: Stress - At Risk (2021)    Received from [facility]    Stress     Stress: 2   Social Connections: Not on File (2021)    Received from [facility]    Social Connections     Connectedness: 0   Housing Stability: Not on File (2021)    Received from [facility]    Housing Stability     Housin     Current Outpatient Medications   Medication Instructions    acetaminophen (TYLENOL 8 HOUR) 650 mg, oral, Every 8 hours PRN, Do not crush, chew, or split.    b complex 0.4 mg tablet 1 tablet, oral, Daily    calcium carbonate 500 mg calcium (1,250 mg) chewable tablet 1 tablet, oral, Daily    dicyclomine (BENTYL) 20 mg, oral, 4 times daily PRN    melatonin 10 mg tablet oral    multivitamin tablet 1 tablet,  oral, Daily    omeprazole (PRILOSEC) 40 mg, oral, Do not crush or chew.    ondansetron ODT (ZOFRAN-ODT) 4 mg, oral, Every 8 hours PRN     Allergies   Allergen Reactions    Adhesive Tape-Silicones Swelling           ED Triage Vitals [12/16/24 2031]   Temperature Heart Rate Respirations BP   37 °C (98.6 °F) 86 18 130/80      Pulse Ox Temp Source Heart Rate Source Patient Position   97 % Temporal Monitor Sitting      BP Location FiO2 (%)     Left arm --               Labs and Imaging were reviewed and discussed with patient          ED Course as of 12/17/24 0021   Mon Dec 16, 2024   2138 EKG interpreted by me shows normal sinus rhythm no STEMI rate 86 compared to prior EKG similar findings present [WL]   2305 Ct shows 1. No acute abnormality within the abdomen or pelvis.  2. Postsurgical changes of Frandy-en-Y gastric bypass.  3. No evidence of bowel obstruction or appreciable bowel inflammation.  4. Cholecystectomy.   [WL]   Tue Dec 17, 2024   0013 Despite being given Bentyl and Zofran  Was nauseous but had still pain.  Was then given morphine. [WL]   0014 She already has an appointment to follow-up with her primary care physician this week.  Advised her to go to this. [WL]   0018 Some budding yeast present in urine otherwise asymptomatic, no urinary symptoms here.  Advised to follow-up closely with her primary care physician.  Will give her Bentyl as prescription to go home with. [WL]   0020 She did not want the morphine here and is ready to go home on reevaluation. [WL]      ED Course User Index  [WL] Juan Diego Thompson DO         Diagnoses as of 12/17/24 0021   Lower abdominal pain               Procedure  Procedures         Note: This note was dictated by speech recognition. Minor errors in transcription may be present.          Juan Diego Thompson DO  12/17/24 0021

## 2024-12-23 ENCOUNTER — HOSPITAL ENCOUNTER (OUTPATIENT)
Dept: RADIOLOGY | Facility: HOSPITAL | Age: 54
Discharge: HOME | End: 2024-12-23
Payer: COMMERCIAL

## 2024-12-23 DIAGNOSIS — M25.561 PAIN IN RIGHT KNEE: ICD-10-CM

## 2024-12-23 PROCEDURE — 73562 X-RAY EXAM OF KNEE 3: CPT | Mod: RIGHT SIDE | Performed by: RADIOLOGY

## 2024-12-23 PROCEDURE — 73562 X-RAY EXAM OF KNEE 3: CPT | Mod: RT

## 2025-02-10 ENCOUNTER — HOSPITAL ENCOUNTER (OUTPATIENT)
Dept: RADIOLOGY | Facility: HOSPITAL | Age: 55
Discharge: HOME | End: 2025-02-10
Payer: COMMERCIAL

## 2025-02-10 VITALS — HEIGHT: 60 IN | WEIGHT: 140 LBS | BODY MASS INDEX: 27.48 KG/M2

## 2025-02-10 DIAGNOSIS — Z12.31 SCREENING MAMMOGRAM FOR BREAST CANCER: ICD-10-CM

## 2025-02-10 PROCEDURE — 77067 SCR MAMMO BI INCL CAD: CPT | Performed by: RADIOLOGY

## 2025-02-10 PROCEDURE — 77063 BREAST TOMOSYNTHESIS BI: CPT

## 2025-02-10 PROCEDURE — 77063 BREAST TOMOSYNTHESIS BI: CPT | Performed by: RADIOLOGY

## 2025-02-19 ENCOUNTER — APPOINTMENT (OUTPATIENT)
Dept: GASTROENTEROLOGY | Facility: CLINIC | Age: 55
End: 2025-02-19
Payer: COMMERCIAL

## 2025-03-28 ENCOUNTER — APPOINTMENT (OUTPATIENT)
Dept: ORTHOPEDIC SURGERY | Facility: CLINIC | Age: 55
End: 2025-03-28
Payer: COMMERCIAL

## 2025-03-28 DIAGNOSIS — G89.29 CHRONIC PAIN OF LEFT KNEE: Primary | ICD-10-CM

## 2025-03-28 DIAGNOSIS — M25.562 CHRONIC PAIN OF LEFT KNEE: Primary | ICD-10-CM

## 2025-03-28 PROCEDURE — 99215 OFFICE O/P EST HI 40 MIN: CPT | Performed by: ORTHOPAEDIC SURGERY

## 2025-03-28 PROCEDURE — 1036F TOBACCO NON-USER: CPT | Performed by: ORTHOPAEDIC SURGERY

## 2025-03-28 RX ORDER — CEFAZOLIN SODIUM 2 G/100ML
2 INJECTION, SOLUTION INTRAVENOUS ONCE
OUTPATIENT
Start: 2025-03-28 | End: 2025-03-28

## 2025-03-28 RX ORDER — ACETAMINOPHEN 325 MG/1
975 TABLET ORAL ONCE
OUTPATIENT
Start: 2025-03-28 | End: 2025-03-28

## 2025-03-28 RX ORDER — CELECOXIB 400 MG/1
400 CAPSULE ORAL ONCE
OUTPATIENT
Start: 2025-03-28 | End: 2025-03-28

## 2025-03-28 ASSESSMENT — PAIN SCALES - GENERAL: PAINLEVEL_OUTOF10: 8

## 2025-03-28 ASSESSMENT — PAIN - FUNCTIONAL ASSESSMENT: PAIN_FUNCTIONAL_ASSESSMENT: 0-10

## 2025-03-28 NOTE — PROGRESS NOTES
This is a consultation from Dr. Deb Raymond MD for   Chief Complaint   Patient presents with    Left Knee - Pain       This is a 55 y.o. female who presents for follow-up for her left knee.  Patient had left knee injection back in December, it gave relief but it did not last long.  It did not provide complete relief.  Since then he has been unfortunately getting worse, she complains of a severe deep sharp stabbing pain over the anterior medial knee, it has been severely exacerbated the last few weeks.  No numbness no tingling no fevers no chills.  She has had extensive trial of nonsurgical management occluding use of anti-inflammatories physical therapy activity modification use of assistive devices cortisone injections.  Despite that she has severe and worsening pain which impacts her quality of life and activities of daily living    Physical Exam    There has been no interval change in this patient's past medical, surgical, medications, allergies, family history or social history since the most recent visit to a provider within our department. 14 point review of systems was performed, reviewed, and negative except for pertinent positives documented in the history of present illness.     Constitutional: well developed, well nourished female in no acute distress  Psychiatric: normal mood, appropriate affect  Eyes: sclera anicteric  HENT: normocephalic/atraumatic  CV: regular rate and rhythm   Respiratory: non labored breathing  Integumentary: no rash  Neurological: moves all extremities    Left tender medial knee exam: skin intact no lacerations or abrations.  1+ effusion.  Tender medial joint line. negative log roll negative patellar grind. ROM 5-100 slight varus deformity stable to varus and valgus stress at 0 and 30 degrees. negative lachman negative posterior drawer negative zeus. 5/5 ehl/fhl/gs/ta. silt s/s/sp/dp/t. 2+ dp/pt        Imaging:  Xrays were ordered by me, they were reviewed and independently  interpreted by me today, they show severe degenerative disease in the left knee bone-on-bone arthritis subchondral sclerosis osteophyte formation Kellgren-Shaka grade 4.  I reviewed AP lateral and tunnel views from December 23, 2024.  Will get new x-rays prior to surgery.    Procedures      Impression/Plan: This is a 55 y.o. female with severe end-stage degenerative disease left knee that has failed nonoperative management.  Patient like to proceed with left total knee.    I had an extensive discussion with the patient regarding her condition and possible treatment options. Nonsurgical treatment for left knee arthritis includes activity modification, weight loss, use of a cane or other assistive device, pain medications and nonsteroidal anti-inflammatory medications, joint injections, and physical therapy. The patient has attempted non-surgical treatment for this condition during the past year for greater than 6 months and it has failed. We discussed the risks benefits and alternatives of total knee arthroplasty. Benefits of joint replacement include: Relief of pain, improvement of function, and improved quality of life. Alternatives include observation and watchful waiting, and the nonsurgical modalities noted above.     Discussed with the patient that during total knee arthroplasty prosthetic resurfacing of the patella may or may not be performed at the discretion of thesurgeon during surgery. In the event that prosthetic patellar resurfacing is not performed, nonprosthetic arthroplasty will be performed with removal of bone spurs, circumferential synovectomy and electrocautery. Patient was informed that anterior knee pain and patellofemoral crepitus can potentially occur after knee surgery with or without prosthetic patellar resurfacing.     1 issue of concern for this patient is  their history of diabetes.  The patient's most recent A1c was less than 7.5 which indicates is under sufficient control to proceed  with total joint surgery.  Diabetes raises the risk for complications after total joint surgeries especially risk of infection.  The patient is aware of this and would still like to proceed.        We discussed the risks of complications as well as the risks of morbidity and mortality related to surgical treatment with total joint replacement. We reviewed the fact that total joint replacement is major elective surgery with significant associated surgical and procedural risk factors as detailed below.   Risks include: Pain, blood loss, damage to nearby anatomical structures including but not limited to nerves or blood vessels muscles tendons and bone, failure surgery to ameliorate symptoms, persistence of pain surrounding the affected joint, mechanical failure of the prosthesis including but not limited to loosening of the prosthesis from bone ,breakage of the prosthesis and dislocation of the prosthesis, change in length or appearance of a limb, infection possibly necessitating further surgery, removal of the prosthesis, or limb amputation, the need for additional surgery for other reasons, blood clots, amputation, and death. No guarantees were implied or given.  All questions were answered and the patient voiced their understanding.     A complete set of surgical instructions was given to the patient. The patient was educated regarding preoperative nutrition, preparation of their home for postoperative rehabilitation, choosing a care partner, medical and dental clearance, the cessation of medications that can cause bleeding or presenting to risk for infection, chlorhexidine bath, nasal swab and decontamination, post operative follow up, and need for medical equipment. Patient was also educated regarding the possibility of same day surgery and related criteria and protocols. The patient will attend our joint class further education, and thereafter they will return for a preoperative visit. A presurgery education  "booklet was also given to the patient.     surgical plan: Left total knee  implants: DePuy  approach: Standard  DVT ppx aspirin  drugs to stop none  allergy to abx none  post operative abx: ancef +/- vanc (per protocol)  special clearance needed none    BMI Readings from Last 1 Encounters:   02/10/25 27.34 kg/m²      Lab Results   Component Value Date    CREATININE 0.65 12/16/2024     Tobacco Use: Low Risk  (3/28/2025)    Patient History     Smoking Tobacco Use: Never     Smokeless Tobacco Use: Never     Passive Exposure: Not on file      Computed MELD 3.0 unavailable. One or more values for this score either were not found within the given timeframe or did not fit some other criterion.  Computed MELD-Na unavailable. One or more values for this score either were not found within the given timeframe or did not fit some other criterion.       Lab Results   Component Value Date    HGBA1C 6.7 (H) 02/27/2022     No results found for: \"STAPHMRSASCR\"  "

## 2025-04-03 ENCOUNTER — TELEPHONE (OUTPATIENT)
Dept: PRIMARY CARE | Facility: CLINIC | Age: 55
End: 2025-04-03
Payer: COMMERCIAL

## 2025-04-03 DIAGNOSIS — M25.562 CHRONIC PAIN OF LEFT KNEE: Primary | ICD-10-CM

## 2025-04-03 DIAGNOSIS — G89.29 CHRONIC PAIN OF RIGHT KNEE: ICD-10-CM

## 2025-04-03 DIAGNOSIS — G89.29 CHRONIC PAIN OF LEFT KNEE: Primary | ICD-10-CM

## 2025-04-03 DIAGNOSIS — M25.561 CHRONIC PAIN OF RIGHT KNEE: ICD-10-CM

## 2025-04-03 NOTE — TELEPHONE ENCOUNTER
Noted and appreciate update. Please schedule a virtual visit in next 2 weeks to address as we will need her to sign a controlled pain agreement given that I am now seeing her in a new system and obtain a UDS - UDS is in. Thank you!

## 2025-04-03 NOTE — TELEPHONE ENCOUNTER
Pt called in and stated that she was prescribed oxy in the past for as needed for pain and is now requesting it again . Please advise I did not see this in her med list. She is having surgery in June for her knee.     [facility] pharmacy in [place]

## 2025-04-04 NOTE — TELEPHONE ENCOUNTER
LM to call and schedule virtual appt for the controlled substance agreement. Pt made aware on VM to get the urine drug screen done as well. She will need to do these prior to refills of oxy

## 2025-04-04 NOTE — TELEPHONE ENCOUNTER
Would you be able to call and offer a virtual anytime on Tuesday 4/8 or Thursday 4/10 (you can double book). Thanks!

## 2025-04-18 ENCOUNTER — TELEPHONE (OUTPATIENT)
Dept: ORTHOPEDIC SURGERY | Facility: CLINIC | Age: 55
End: 2025-04-18
Payer: COMMERCIAL

## 2025-04-18 NOTE — TELEPHONE ENCOUNTER
Odessa came in the office and would like to take the class and change the date for upcoming surgery on lt knee tka on June 9th. 2025 by dr bliss  She is wanting to take the class but wants a certain date.  [phone]

## 2025-05-08 ENCOUNTER — APPOINTMENT (OUTPATIENT)
Dept: ORTHOPEDIC SURGERY | Facility: CLINIC | Age: 55
End: 2025-05-08
Payer: COMMERCIAL

## 2025-05-15 ENCOUNTER — OFFICE VISIT (OUTPATIENT)
Dept: PRIMARY CARE | Facility: CLINIC | Age: 55
End: 2025-05-15
Payer: COMMERCIAL

## 2025-05-15 VITALS
BODY MASS INDEX: 27.34 KG/M2 | SYSTOLIC BLOOD PRESSURE: 132 MMHG | TEMPERATURE: 97.5 F | RESPIRATION RATE: 16 BRPM | WEIGHT: 140 LBS | DIASTOLIC BLOOD PRESSURE: 82 MMHG

## 2025-05-15 DIAGNOSIS — M25.562 CHRONIC PAIN OF LEFT KNEE: ICD-10-CM

## 2025-05-15 DIAGNOSIS — G89.29 CHRONIC PAIN OF LEFT KNEE: ICD-10-CM

## 2025-05-15 DIAGNOSIS — Z98.84 HISTORY OF GASTRIC BYPASS: ICD-10-CM

## 2025-05-15 DIAGNOSIS — Z23 NEED FOR VACCINATION: ICD-10-CM

## 2025-05-15 DIAGNOSIS — K21.9 GASTROESOPHAGEAL REFLUX DISEASE, UNSPECIFIED WHETHER ESOPHAGITIS PRESENT: ICD-10-CM

## 2025-05-15 DIAGNOSIS — F51.01 PRIMARY INSOMNIA: ICD-10-CM

## 2025-05-15 DIAGNOSIS — Z00.01 ENCOUNTER FOR ANNUAL GENERAL MEDICAL EXAMINATION WITH ABNORMAL FINDINGS IN ADULT: Primary | ICD-10-CM

## 2025-05-15 DIAGNOSIS — E78.2 MIXED HYPERLIPIDEMIA: ICD-10-CM

## 2025-05-15 DIAGNOSIS — E66.3 OVERWEIGHT (BMI 25.0-29.9): ICD-10-CM

## 2025-05-15 DIAGNOSIS — I10 ESSENTIAL HYPERTENSION: ICD-10-CM

## 2025-05-15 DIAGNOSIS — Z01.818 PRE-OPERATIVE CLEARANCE: ICD-10-CM

## 2025-05-15 DIAGNOSIS — E11.65 TYPE 2 DIABETES MELLITUS WITH HYPERGLYCEMIA, WITHOUT LONG-TERM CURRENT USE OF INSULIN: ICD-10-CM

## 2025-05-15 PROBLEM — M17.11 ARTHRITIS OF RIGHT KNEE: Status: ACTIVE | Noted: 2024-12-31

## 2025-05-15 PROBLEM — G47.33 OBSTRUCTIVE SLEEP APNEA SYNDROME: Status: ACTIVE | Noted: 2022-01-11

## 2025-05-15 PROBLEM — F32.9 MAJOR DEPRESSIVE DISORDER: Status: ACTIVE | Noted: 2025-05-15

## 2025-05-15 PROBLEM — G43.909 MIGRAINE HEADACHE: Status: ACTIVE | Noted: 2022-02-21

## 2025-05-15 PROBLEM — F41.1 GENERALIZED ANXIETY DISORDER: Status: ACTIVE | Noted: 2021-12-02

## 2025-05-15 PROBLEM — J30.2 SEASONAL ALLERGIES: Status: ACTIVE | Noted: 2022-06-14

## 2025-05-15 PROBLEM — L23.9 ALLERGIC DERMATITIS: Status: ACTIVE | Noted: 2024-07-18

## 2025-05-15 LAB — POC HEMOGLOBIN A1C: 5.2 % (ref 4.2–6.5)

## 2025-05-15 PROCEDURE — 99204 OFFICE O/P NEW MOD 45 MIN: CPT | Performed by: STUDENT IN AN ORGANIZED HEALTH CARE EDUCATION/TRAINING PROGRAM

## 2025-05-15 PROCEDURE — 3075F SYST BP GE 130 - 139MM HG: CPT | Performed by: STUDENT IN AN ORGANIZED HEALTH CARE EDUCATION/TRAINING PROGRAM

## 2025-05-15 PROCEDURE — 83036 HEMOGLOBIN GLYCOSYLATED A1C: CPT | Mod: QW | Performed by: STUDENT IN AN ORGANIZED HEALTH CARE EDUCATION/TRAINING PROGRAM

## 2025-05-15 PROCEDURE — 99214 OFFICE O/P EST MOD 30 MIN: CPT | Performed by: STUDENT IN AN ORGANIZED HEALTH CARE EDUCATION/TRAINING PROGRAM

## 2025-05-15 PROCEDURE — 3044F HG A1C LEVEL LT 7.0%: CPT | Performed by: STUDENT IN AN ORGANIZED HEALTH CARE EDUCATION/TRAINING PROGRAM

## 2025-05-15 PROCEDURE — 3079F DIAST BP 80-89 MM HG: CPT | Performed by: STUDENT IN AN ORGANIZED HEALTH CARE EDUCATION/TRAINING PROGRAM

## 2025-05-15 PROCEDURE — 99386 PREV VISIT NEW AGE 40-64: CPT | Performed by: STUDENT IN AN ORGANIZED HEALTH CARE EDUCATION/TRAINING PROGRAM

## 2025-05-15 PROCEDURE — 99386 PREV VISIT NEW AGE 40-64: CPT | Mod: 25 | Performed by: STUDENT IN AN ORGANIZED HEALTH CARE EDUCATION/TRAINING PROGRAM

## 2025-05-15 RX ORDER — ACETAMINOPHEN, DIPHENHYDRAMINE HCL, PHENYLEPHRINE HCL 325; 25; 5 MG/1; MG/1; MG/1
10 TABLET ORAL NIGHTLY
Qty: 90 TABLET | Refills: 3 | Status: SHIPPED | OUTPATIENT
Start: 2025-05-15 | End: 2025-05-15 | Stop reason: WASHOUT

## 2025-05-15 RX ORDER — ONDANSETRON 4 MG/1
4 TABLET, ORALLY DISINTEGRATING ORAL
COMMUNITY
Start: 2023-06-09

## 2025-05-15 RX ORDER — OXYCODONE HYDROCHLORIDE 5 MG/1
5 TABLET ORAL EVERY 6 HOURS PRN
COMMUNITY
Start: 2024-12-17

## 2025-05-15 RX ORDER — ESCITALOPRAM OXALATE 20 MG/1
20 TABLET ORAL
COMMUNITY
Start: 2024-08-15

## 2025-05-15 RX ORDER — RIZATRIPTAN BENZOATE 10 MG/1
TABLET ORAL
COMMUNITY
Start: 2023-10-12

## 2025-05-15 RX ORDER — FAMOTIDINE 40 MG/1
40 TABLET, FILM COATED ORAL 2 TIMES DAILY PRN
COMMUNITY
Start: 2024-12-13 | End: 2025-05-15 | Stop reason: SDUPTHER

## 2025-05-15 RX ORDER — FAMOTIDINE 40 MG/1
40 TABLET, FILM COATED ORAL 2 TIMES DAILY PRN
Qty: 180 TABLET | Refills: 3 | Status: SHIPPED | OUTPATIENT
Start: 2025-05-15

## 2025-05-15 RX ORDER — ACETAMINOPHEN 500 MG
10 TABLET ORAL DAILY
Qty: 180 TABLET | Refills: 3 | Status: SHIPPED | OUTPATIENT
Start: 2025-05-15

## 2025-05-15 RX ORDER — OMEPRAZOLE 40 MG/1
40 CAPSULE, DELAYED RELEASE ORAL
Qty: 90 CAPSULE | Refills: 3 | Status: SHIPPED | OUTPATIENT
Start: 2025-05-15

## 2025-05-15 ASSESSMENT — PATIENT HEALTH QUESTIONNAIRE - PHQ9
1. LITTLE INTEREST OR PLEASURE IN DOING THINGS: NOT AT ALL
2. FEELING DOWN, DEPRESSED OR HOPELESS: NOT AT ALL
SUM OF ALL RESPONSES TO PHQ9 QUESTIONS 1 & 2: 0

## 2025-05-15 ASSESSMENT — COLUMBIA-SUICIDE SEVERITY RATING SCALE - C-SSRS
2. HAVE YOU ACTUALLY HAD ANY THOUGHTS OF KILLING YOURSELF?: NO
6. HAVE YOU EVER DONE ANYTHING, STARTED TO DO ANYTHING, OR PREPARED TO DO ANYTHING TO END YOUR LIFE?: NO
1. IN THE PAST MONTH, HAVE YOU WISHED YOU WERE DEAD OR WISHED YOU COULD GO TO SLEEP AND NOT WAKE UP?: NO

## 2025-05-15 ASSESSMENT — PAIN SCALES - GENERAL: PAINLEVEL_OUTOF10: 0-NO PAIN

## 2025-05-15 NOTE — PROGRESS NOTES
OUTPATIENT VISIT - [facility]   Patient ID: Odessa Thompson is a 55 y.o. female who presents for med refilled.    HPI  HPI     Was seen by me at [facility]. Followed me here. Works as a  in [place].     June 9th getting left knee replaced. Next week pre-admission testing. Hurt her left knee this morning on top of it - was walking up the stairs at work and hurt it. Has a few oxycodone at home - we were giving one time scripts for severe pain. Also has tramadol for breakthrough.     S/p bariatric surgery - took about 2 years to feel good. Eating salads daily. Has to be cautious with acidic things such as salad dressing, condiments, no onions. Cannot tolerate hamburger, but can eat veggie burgers. Has learned what she can and cannot do. Chicken she can finally eat. Acid reflux is still bad for her. Taking the famotadine, omeprazole 40.     Depression/anxiety - comes and goes. Anniversary of the passing of her parents is hard 15th and 16th of July. No longer on the lexapro daily, taks it as needed    Chronic migraines - has the maxalt on hand if needs but has not needed in some time    Objective     ROS  Review of Systems  All pertinent positive symptoms are included in the history of present illness.  All other systems have been reviewed and are negative and noncontributory to this patient's current ailments.    PHQ9/GAD7:  Patient Health Questionnaire-2 Score: 0 (5/15/2025  2:38 PM)   No data recorded   No data recorded     Current Medications  Current Outpatient Medications   Medication Instructions    acetaminophen (TYLENOL 8 HOUR) 650 mg, Every 8 hours PRN    b complex 0.4 mg tablet 1 tablet, Daily    calcium carbonate 500 mg calcium (1,250 mg) chewable tablet 1 tablet, Daily    escitalopram (LEXAPRO) 20 mg, Daily RT    famotidine (PEPCID) 40 mg, oral, 2 times daily PRN    melatonin 10 mg, oral, Daily    multivitamin tablet 1 tablet, Daily    omeprazole (PRILOSEC) 40 mg,  oral, Daily before breakfast, Do not crush or chew.    ondansetron ODT (ZOFRAN-ODT) 4 mg    oxyCODONE (ROXICODONE) 5 mg, Every 6 hours PRN    rizatriptan (Maxalt) 10 mg tablet rizatriptan (MAXALT) 10 mg tablet Indications: Intractable migraine without aura and without status migrainosus Take 1 dose at onset of migraine, can take second dose if headache persists after 2 hours 30 Tablet 2 10/12/2023 Active        Allergies  Allergies[1]     VITAL SIGNS  Vitals:    05/15/25 1435   BP: 132/82   Resp: 16   Temp: 36.4 °C (97.5 °F)     Vitals:    05/15/25 1435   Weight: 63.5 kg (140 lb)      Body mass index is 27.34 kg/m².     Physical Exam  Vitals and nursing note reviewed.   Constitutional:       General: She is not in acute distress.  HENT:      Head: Normocephalic.   Cardiovascular:      Rate and Rhythm: Normal rate and regular rhythm.   Pulmonary:      Effort: Pulmonary effort is normal. No respiratory distress.   Neurological:      General: No focal deficit present.      Mental Status: She is alert and oriented to person, place, and time.   Psychiatric:         Mood and Affect: Mood normal.         Thought Content: Thought content normal.         Assessment/Plan   Assessment & Plan  Encounter for annual general medical examination with abnormal findings in adult         Type 2 diabetes mellitus with hyperglycemia, without long-term current use of insulin  Chronic, well controlled well beneath goal with hga1c 5.2  Currently managed with lifestyle  Continue healthy eating/exercise  No longer on statin - when labs result discuss importance of resuming    Orders:    POCT glycosylated hemoglobin (Hb A1C) manually resulted    Lipid Panel; Future    Comprehensive Metabolic Panel; Future    Albumin-Creatinine Ratio, Urine Random; Future    Chronic pain of left knee  Chronic, scheduled for left knee replacement with Dr. Nicolas 6/9  Surgically cleared for surgery from my standpoint as long as labs (cmp, cbc) unremarkable  To  let me know if pre-op clearance paperwork to be completed, might require an EKG as well?  Urine drug panel in the event requires opioids for medication management either pre or post-op  Controlled medication agreement signed as well    Medical Clearance  Type of Surgery: left knee replacement  Diagnosis: chronic pain, arthritis of left knee  Date of Surgery: 6/9/25  Surgeon: Dr. Jcaek Nicolas  Surgical Facility[facility]  Issues with Anesthesia: allergy to adhesive tape, no oral nsaids with h/o bariatric surgery  Orders:    Drug Screen, Urine With Reflex to Confirmation; Future    Pre-operative clearance  See above       History of gastric bypass  Continue gerd management, continue healthy eating and increased activity as able given left knee pain  Updating labs as no longer taking several vitamins  No nsaids    Orders:    CBC and Auto Differential; Future    Vitamin B12; Future    Ferritin; Future    Iron and TIBC; Future    Vitamin D 25-Hydroxy,Total (for eval of Vitamin D levels); Future    Zinc; Future    Copper, serum; Future    Vitamin B1, Whole Blood; Future    Gastroesophageal reflux disease, unspecified whether esophagitis present  Chronic, add prilosec for additional control, continue pepcid 40 bid, continue limiting triggers    Orders:    famotidine (Pepcid) 40 mg tablet; Take 1 tablet (40 mg) by mouth 2 times a day as needed for heartburn.    omeprazole (PriLOSEC) 40 mg DR capsule; Take 1 capsule (40 mg) by mouth once daily in the morning. Take before meals. Do not crush or chew.    Essential hypertension  Chronic, goal <130/80, just borderline elevated today  Continue lifestyle management with limiting salt, no nsaids given above, limit caffeine  Updating labs/urine    Orders:    Comprehensive Metabolic Panel; Future    TSH with reflex to Free T4 if abnormal; Future    Albumin-Creatinine Ratio, Urine Random; Future    Mixed hyperlipidemia    Orders:    Lipid Panel; Future    Overweight (BMI  25.0-29.9)    Orders:    Lipid Panel; Future    Comprehensive Metabolic Panel; Future    TSH with reflex to Free T4 if abnormal; Future    Primary insomnia  Chronic, well controlled per patient with melatonin 10 nightly  Insurance does not cover the 10mg tabletes, is open to doubling the 5mg ones    Orders:    melatonin 5 mg tablet; Take 2 tablets (10 mg) by mouth once daily.    Need for vaccination  Obtaining to determine need for vaccines      Orders:    Measles (Rubeola) Antibody, IgG; Future    Mumps Antibody, IgG; Future    Rubella antibody, IgG; Future        Counseling:       Medication education:           Education:  The patient is counseled regarding potential side-effects of all new medications          Understanding:  Patient expressed understanding of information conveyed today          Adherence:  No barriers to adherence identified       Follow-up: 6-8 weeks, post-op    ** Please excuse any errors in grammar or translation related to this dictation. Voice recognition software was utilized to prepare this document. **       Deb Raymond MD   05/16/25          [1]   Allergies  Allergen Reactions    Adhesive Tape-Silicones Swelling

## 2025-05-15 NOTE — PATIENT INSTRUCTIONS
It was a pleasure seeing you!! You are amazing!!    Please complete your blood work next week if possible! :) Let me know if any issues.    Reach out if you need any more pain medication.    Follow-up with me in 6-8 weeks (follow-up knee surgery).

## 2025-05-16 NOTE — ASSESSMENT & PLAN NOTE
Chronic, goal <130/80, just borderline elevated today  Continue lifestyle management with limiting salt, no nsaids given above, limit caffeine  Updating labs/urine    Orders:    Comprehensive Metabolic Panel; Future    TSH with reflex to Free T4 if abnormal; Future    Albumin-Creatinine Ratio, Urine Random; Future

## 2025-05-16 NOTE — ASSESSMENT & PLAN NOTE
Chronic, scheduled for left knee replacement with Dr. Nicolas 6/9  Surgically cleared for surgery from my standpoint as long as labs (cmp, cbc) unremarkable  To let me know if pre-op clearance paperwork to be completed, might require an EKG as well?  Urine drug panel in the event requires opioids for medication management either pre or post-op  Controlled medication agreement signed as well    Medical Clearance  Type of Surgery: left knee replacement  Diagnosis: chronic pain, arthritis of left knee  Date of Surgery: 6/9/25  Surgeon: Dr. Jacek Nicolas  Surgical Facility: [facility]  Issues with Anesthesia: allergy to adhesive tape, no oral nsaids with h/o bariatric surgery  Orders:    Drug Screen, Urine With Reflex to Confirmation; Future

## 2025-05-16 NOTE — ASSESSMENT & PLAN NOTE
Orders:    Lipid Panel; Future    Comprehensive Metabolic Panel; Future    TSH with reflex to Free T4 if abnormal; Future

## 2025-05-16 NOTE — ASSESSMENT & PLAN NOTE
Chronic, well controlled well beneath goal with hga1c 5.2  Currently managed with lifestyle  Continue healthy eating/exercise  No longer on statin - when labs result discuss importance of resuming    Orders:    POCT glycosylated hemoglobin (Hb A1C) manually resulted    Lipid Panel; Future    Comprehensive Metabolic Panel; Future    Albumin-Creatinine Ratio, Urine Random; Future

## 2025-05-16 NOTE — ASSESSMENT & PLAN NOTE
Continue gerd management, continue healthy eating and increased activity as able given left knee pain  Updating labs as no longer taking several vitamins  No nsaids    Orders:    CBC and Auto Differential; Future    Vitamin B12; Future    Ferritin; Future    Iron and TIBC; Future    Vitamin D 25-Hydroxy,Total (for eval of Vitamin D levels); Future    Zinc; Future    Copper, serum; Future    Vitamin B1, Whole Blood; Future

## 2025-05-18 ENCOUNTER — PATIENT MESSAGE (OUTPATIENT)
Dept: PRIMARY CARE | Facility: CLINIC | Age: 55
End: 2025-05-18
Payer: COMMERCIAL

## 2025-05-18 DIAGNOSIS — M25.562 CHRONIC PAIN OF LEFT KNEE: Primary | ICD-10-CM

## 2025-05-18 DIAGNOSIS — G89.29 CHRONIC PAIN OF LEFT KNEE: Primary | ICD-10-CM

## 2025-05-19 RX ORDER — OXYCODONE HYDROCHLORIDE 5 MG/1
5 TABLET ORAL EVERY 8 HOURS PRN
Qty: 15 TABLET | Refills: 0 | Status: SHIPPED | OUTPATIENT
Start: 2025-05-19

## 2025-05-23 ENCOUNTER — ANESTHESIA EVENT (OUTPATIENT)
Dept: OPERATING ROOM | Facility: HOSPITAL | Age: 55
End: 2025-05-23
Payer: COMMERCIAL

## 2025-05-23 ENCOUNTER — HOSPITAL ENCOUNTER (OUTPATIENT)
Dept: RADIOLOGY | Facility: HOSPITAL | Age: 55
Discharge: HOME | End: 2025-05-23
Payer: COMMERCIAL

## 2025-05-23 ENCOUNTER — PRE-ADMISSION TESTING (OUTPATIENT)
Dept: PREADMISSION TESTING | Facility: HOSPITAL | Age: 55
End: 2025-05-23
Payer: COMMERCIAL

## 2025-05-23 VITALS
WEIGHT: 147.49 LBS | SYSTOLIC BLOOD PRESSURE: 138 MMHG | BODY MASS INDEX: 28.96 KG/M2 | OXYGEN SATURATION: 99 % | HEIGHT: 60 IN | RESPIRATION RATE: 18 BRPM | DIASTOLIC BLOOD PRESSURE: 81 MMHG | HEART RATE: 57 BPM | TEMPERATURE: 96.6 F

## 2025-05-23 DIAGNOSIS — G89.29 CHRONIC PAIN OF LEFT KNEE: ICD-10-CM

## 2025-05-23 DIAGNOSIS — Z01.818 PREOPERATIVE EXAMINATION: Primary | ICD-10-CM

## 2025-05-23 DIAGNOSIS — M25.562 CHRONIC PAIN OF LEFT KNEE: ICD-10-CM

## 2025-05-23 DIAGNOSIS — R73.03 PRE-DIABETES: ICD-10-CM

## 2025-05-23 LAB
ALBUMIN SERPL BCP-MCNC: 4.4 G/DL (ref 3.4–5)
ALP SERPL-CCNC: 97 U/L (ref 33–110)
ALT SERPL W P-5'-P-CCNC: 11 U/L (ref 7–45)
ANION GAP SERPL CALC-SCNC: 12 MMOL/L (ref 10–20)
AST SERPL W P-5'-P-CCNC: 18 U/L (ref 9–39)
BASOPHILS # BLD AUTO: 0.12 X10*3/UL (ref 0–0.1)
BASOPHILS NFR BLD AUTO: 1.8 %
BILIRUB SERPL-MCNC: 0.7 MG/DL (ref 0–1.2)
BUN SERPL-MCNC: 13 MG/DL (ref 6–23)
CALCIUM SERPL-MCNC: 9.4 MG/DL (ref 8.6–10.3)
CHLORIDE SERPL-SCNC: 100 MMOL/L (ref 98–107)
CO2 SERPL-SCNC: 28 MMOL/L (ref 21–32)
CREAT SERPL-MCNC: 0.7 MG/DL (ref 0.5–1.05)
EGFRCR SERPLBLD CKD-EPI 2021: >90 ML/MIN/1.73M*2
EOSINOPHIL # BLD AUTO: 0.3 X10*3/UL (ref 0–0.7)
EOSINOPHIL NFR BLD AUTO: 4.5 %
ERYTHROCYTE [DISTWIDTH] IN BLOOD BY AUTOMATED COUNT: 12.8 % (ref 11.5–14.5)
GLUCOSE SERPL-MCNC: 68 MG/DL (ref 74–99)
HCT VFR BLD AUTO: 42.9 % (ref 36–46)
HGB BLD-MCNC: 14.2 G/DL (ref 12–16)
IMM GRANULOCYTES # BLD AUTO: 0.02 X10*3/UL (ref 0–0.7)
IMM GRANULOCYTES NFR BLD AUTO: 0.3 % (ref 0–0.9)
LYMPHOCYTES # BLD AUTO: 1.82 X10*3/UL (ref 1.2–4.8)
LYMPHOCYTES NFR BLD AUTO: 27.6 %
MCH RBC QN AUTO: 30.1 PG (ref 26–34)
MCHC RBC AUTO-ENTMCNC: 33.1 G/DL (ref 32–36)
MCV RBC AUTO: 91 FL (ref 80–100)
MONOCYTES # BLD AUTO: 0.32 X10*3/UL (ref 0.1–1)
MONOCYTES NFR BLD AUTO: 4.8 %
NEUTROPHILS # BLD AUTO: 4.02 X10*3/UL (ref 1.2–7.7)
NEUTROPHILS NFR BLD AUTO: 61 %
NRBC BLD-RTO: 0 /100 WBCS (ref 0–0)
PLATELET # BLD AUTO: 331 X10*3/UL (ref 150–450)
POTASSIUM SERPL-SCNC: 4.8 MMOL/L (ref 3.5–5.3)
PROT SERPL-MCNC: 7 G/DL (ref 6.4–8.2)
RBC # BLD AUTO: 4.71 X10*6/UL (ref 4–5.2)
SODIUM SERPL-SCNC: 135 MMOL/L (ref 136–145)
WBC # BLD AUTO: 6.6 X10*3/UL (ref 4.4–11.3)

## 2025-05-23 PROCEDURE — 80053 COMPREHEN METABOLIC PANEL: CPT

## 2025-05-23 PROCEDURE — 99244 OFF/OP CNSLTJ NEW/EST MOD 40: CPT | Performed by: NURSE PRACTITIONER

## 2025-05-23 PROCEDURE — 85025 COMPLETE CBC W/AUTO DIFF WBC: CPT

## 2025-05-23 PROCEDURE — 87081 CULTURE SCREEN ONLY: CPT | Mod: GEALAB

## 2025-05-23 PROCEDURE — 77073 BONE LENGTH STUDIES: CPT

## 2025-05-23 PROCEDURE — 73562 X-RAY EXAM OF KNEE 3: CPT | Mod: RT

## 2025-05-23 PROCEDURE — 93005 ELECTROCARDIOGRAM TRACING: CPT

## 2025-05-23 PROCEDURE — 36415 COLL VENOUS BLD VENIPUNCTURE: CPT

## 2025-05-23 PROCEDURE — 73564 X-RAY EXAM KNEE 4 OR MORE: CPT | Mod: LT

## 2025-05-23 RX ORDER — CHLORHEXIDINE GLUCONATE 40 MG/ML
1 SOLUTION TOPICAL DAILY
Start: 2025-05-23 | End: 2025-05-28

## 2025-05-23 RX ORDER — CHLORHEXIDINE GLUCONATE ORAL RINSE 1.2 MG/ML
15 SOLUTION DENTAL DAILY
Qty: 30 ML | Refills: 0 | Status: SHIPPED | OUTPATIENT
Start: 2025-05-23 | End: 2025-05-25

## 2025-05-23 ASSESSMENT — PAIN SCALES - GENERAL: PAINLEVEL_OUTOF10: 7

## 2025-05-23 ASSESSMENT — DUKE ACTIVITY SCORE INDEX (DASI)
CAN YOU PARTICIPATE IN MODERATE RECREATIONAL ACTIVITIES LIKE GOLF, BOWLING, DANCING, DOUBLES TENNIS OR THROWING A BASEBALL OR FOOTBALL: YES
CAN YOU WALK A BLOCK OR TWO ON LEVEL GROUND: YES
CAN YOU TAKE CARE OF YOURSELF (EAT, DRESS, BATHE, OR USE TOILET): YES
CAN YOU WALK INDOORS, SUCH AS AROUND YOUR HOUSE: YES
CAN YOU DO LIGHT WORK AROUND THE HOUSE LIKE DUSTING OR WASHING DISHES: YES
CAN YOU DO MODERATE WORK AROUND THE HOUSE LIKE VACUUMING, SWEEPING FLOORS OR CARRYING GROCERIES: YES
CAN YOU RUN A SHORT DISTANCE: NO
CAN YOU HAVE SEXUAL RELATIONS: YES
CAN YOU DO YARD WORK LIKE RAKING LEAVES, WEEDING OR PUSHING A MOWER: YES
CAN YOU CLIMB A FLIGHT OF STAIRS OR WALK UP A HILL: YES
TOTAL_SCORE: 42.7
CAN YOU PARTICIPATE IN STRENOUS SPORTS LIKE SWIMMING, SINGLES TENNIS, FOOTBALL, BASKETBALL, OR SKIING: NO
DASI METS SCORE: 8
CAN YOU DO HEAVY WORK AROUND THE HOUSE LIKE SCRUBBING FLOORS OR LIFTING AND MOVING HEAVY FURNITURE: YES

## 2025-05-23 ASSESSMENT — ENCOUNTER SYMPTOMS
GASTROINTESTINAL NEGATIVE: 1
CONSTITUTIONAL NEGATIVE: 1
EYE DISCHARGE: 0
NEUROLOGICAL NEGATIVE: 1
MUSCULOSKELETAL NEGATIVE: 1
RESPIRATORY NEGATIVE: 1
CARDIOVASCULAR NEGATIVE: 1
VISUAL CHANGE: 0

## 2025-05-23 ASSESSMENT — PAIN - FUNCTIONAL ASSESSMENT: PAIN_FUNCTIONAL_ASSESSMENT: 0-10

## 2025-05-23 ASSESSMENT — LIFESTYLE VARIABLES: SMOKING_STATUS: NONSMOKER

## 2025-05-23 NOTE — H&P (VIEW-ONLY)
CPM/PAT Evaluation       Name: Odessa Thompson (Odessa Thompson)  /Age: 1970/55 y.o.     Visit Type:   In-Person       Chief Complaint: Chronic pain of left knee    HPI 56 y/o female scheduled for total knee replacement-left on 2025 with  Dr. Nicolas secondary to chronic pain of left knee.  PMHX includes HTN, T2DM, GERD .  PAT is consulted today for perioperative risk stratification and optimization.      Medical History[1]    Surgical History[2]    Patient Sexual activity questions deferred to the physician.    Family History[3]    Allergies[4]    Prior to Admission medications    Medication Sig Start Date End Date Taking? Authorizing Provider   acetaminophen (Tylenol 8 HOUR) 650 mg ER tablet Take 1 tablet (650 mg) by mouth every 8 hours if needed for mild pain (1 - 3). Do not crush, chew, or split.    Historical Provider, MD   b complex 0.4 mg tablet Take 1 tablet by mouth once daily.    Historical Provider, MD   calcium carbonate 500 mg calcium (1,250 mg) chewable tablet Chew 1 tablet (500 mg of elemental calcium) once daily.    Historical Provider, MD   escitalopram (Lexapro) 20 mg tablet Take 1 tablet (20 mg) by mouth once daily. 8/15/24   Historical Provider, MD   famotidine (Pepcid) 40 mg tablet Take 1 tablet (40 mg) by mouth 2 times a day as needed for heartburn. 5/15/25   Deb Raymond MD   melatonin 5 mg tablet Take 2 tablets (10 mg) by mouth once daily. 5/15/25   Deb Raymond MD   multivitamin tablet Take 1 tablet by mouth once daily.    Historical Provider, MD   omeprazole (PriLOSEC) 40 mg DR capsule Take 1 capsule (40 mg) by mouth once daily in the morning. Take before meals. Do not crush or chew. 5/15/25   Deb Raymond MD   ondansetron ODT (Zofran-ODT) 4 mg disintegrating tablet 1 tablet (4 mg). 23   Historical Provider, MD   oxyCODONE (Roxicodone) 5 mg immediate release tablet Take 1 tablet (5 mg) by mouth every 8 hours if needed for severe pain (7 - 10). 25   Deb  MD Segundo   rizatriptan (Maxalt) 10 mg tablet rizatriptan (MAXALT) 10 mg tablet Indications: Intractable migraine without aura and without status migrainosus Take 1 dose at onset of migraine, can take second dose if headache persists after 2 hours 30 Tablet 2 10/12/2023 Active 10/12/23   Historical Provider, MD   oxyCODONE (Roxicodone) 5 mg immediate release tablet Take 1 tablet (5 mg) by mouth every 6 hours if needed. 12/17/24 5/19/25  Historical Provider, MD FARNSWORTH ROS:   Constitutional:   neg    Neuro/Psych:   neg    Eyes:    R worse than L eye   no discharge   no vision loss   use of corrective lenses  Ears:   neg    Nose:   Mouth:   Throat:   Neck:   Cardio:   neg    Respiratory:   neg    Endocrine:   GI:   neg    :   neg    Musculoskeletal:   neg    Hematologic:   neg    Skin:      Physical Exam  Vitals reviewed.   Constitutional:       Appearance: Normal appearance.   HENT:      Head: Normocephalic and atraumatic.      Mouth/Throat:      Mouth: Mucous membranes are moist.      Pharynx: Oropharynx is clear.   Eyes:      Extraocular Movements: Extraocular movements intact.   Cardiovascular:      Rate and Rhythm: Normal rate and regular rhythm.   Pulmonary:      Effort: Pulmonary effort is normal.      Breath sounds: Normal breath sounds.   Abdominal:      General: Bowel sounds are normal.      Palpations: Abdomen is soft.   Musculoskeletal:         General: Normal range of motion.      Cervical back: Normal range of motion.   Skin:     General: Skin is warm and dry.   Neurological:      General: No focal deficit present.      Mental Status: She is alert and oriented to person, place, and time.   Psychiatric:         Mood and Affect: Mood normal.         Behavior: Behavior normal.          Airway    Testing/Diagnostic:     Patient Specialist/PCP:     Visit Vitals  /81   Pulse 57   Temp 35.9 °C (96.6 °F)   Resp 18   Ht 1.524 m (5')   Wt 66.9 kg (147 lb 7.8 oz)   SpO2 99%   BMI 28.80 kg/m²   OB  Status Postmenopausal   Smoking Status Never   BSA 1.68 m²       DASI Risk Score      Flowsheet Row Pre-Admission Testing from 5/23/2025 in [facility]   Can you take care of yourself (eat, dress, bathe, or use toilet)?  2.75 filed at 05/23/2025 0924   Can you walk indoors, such as around your house? 1.75 filed at 05/23/2025 0924   Can you walk a block or two on level ground?  2.75 filed at 05/23/2025 0924   Can you climb a flight of stairs or walk up a hill? 5.5 filed at 05/23/2025 0924   Can you run a short distance? 0 filed at 05/23/2025 0924   Can you do light work around the house like dusting or washing dishes? 2.7 filed at 05/23/2025 0924   Can you do moderate work around the house like vacuuming, sweeping floors or carrying groceries? 3.5 filed at 05/23/2025 0924   Can you do heavy work around the house like scrubbing floors or lifting and moving heavy furniture?  8 filed at 05/23/2025 0924   Can you do yard work like raking leaves, weeding or pushing a mower? 4.5 filed at 05/23/2025 0924   Can you have sexual relations? 5.25 filed at 05/23/2025 0924   Can you participate in moderate recreational activities like golf, bowling, dancing, doubles tennis or throwing a baseball or football? 6 filed at 05/23/2025 0924   Can you participate in strenous sports like swimming, singles tennis, football, basketball, or skiing? 0 filed at 05/23/2025 0924   DASI SCORE 42.7 filed at 05/23/2025 0924   METS Score (Will be calculated only when all the questions are answered) 8 filed at 05/23/2025 0924          Caprini DVT Assessment      Flowsheet Row Pre-Admission Testing from 5/23/2025 in [facility]   DVT Score (IF A SCORE IS NOT CALCULATING, MUST SELECT A BMI TO COMPLETE) 9 filed at 05/23/2025 0955   Surgical Factors Major surgery planned, including arthroscopic and laproscopic (1-2 hours), Elective major lower extremity arthroplasty filed at 05/23/2025 0955   BMI (BMI MUST BE CHOSEN) 30 or less  filed at 05/23/2025 0955          Modified Frailty Index    No data to display       [id number] Stroke Risk Points  Current as of just now        N/A 0 to 9 Points:      Last Change: N/A          The [id number]-[id number] risk score (Daniel BONDS, et al. 2009. © 2010 [employer]) quantifies the risk of stroke for a patient with atrial fibrillation. For patients without atrial fibrillation or under the age of 18 this score appears as N/A. Higher score values generally indicate higher risk of stroke.        This score is not applicable to this patient. Components are not calculated.          Revised Cardiac Risk Index      Flowsheet Row Pre-Admission Testing from 5/23/2025 in [facility]   High-Risk Surgery (Intraperitoneal, Intrathoracic,Suprainguinal vascular) 0 filed at 05/23/2025 0913   History of ischemic heart disease (History of MI, History of positive exercuse test, Current chest paint considered due to myocardial ischemia, Use of nitrate therapy, ECG with pathological Q Waves) 0 filed at 05/23/2025 0913   History of congestive heart failure (pulmonary edemia, bilateral rales or S3 gallop, Paroxysmal nocturnal dyspnea, CXR showing pulmonary vascular redistribution) 0 filed at 05/23/2025 0913   History of cerebrovascular disease (Prior TIA or stroke) 0 filed at 05/23/2025 0913   Pre-operative insulin treatment 0 filed at 05/23/2025 0913   Pre-operative creatinine>2 mg/dl 0 filed at 05/23/2025 0913   Revised Cardiac Risk Calculator 0 filed at 05/23/2025 0913          Apfel Simplified Score      Flowsheet Row Pre-Admission Testing from 5/23/2025 in [facility]   Smoking status 1 filed at 05/23/2025 0914   History of motion sickness or PONV  0 filed at 05/23/2025 0914   Use of postoperative opioids 1 filed at 05/23/2025 0914   Gender - Female 1=Yes filed at 05/23/2025 0914   Apfel Simplified Score Calculator 3 filed at 05/23/2025 0914          Risk Analysis Index Results This  Encounter    No data found in the last 10 encounters.       Stop Bang Score      Flowsheet Row Pre-Admission Testing from 5/23/2025 in [facility]   Do you snore loudly? 0 filed at 05/23/2025 0924   Do you often feel tired or fatigued after your sleep? 0 filed at 05/23/2025 0924   Has anyone ever observed you stop breathing in your sleep? 1 filed at 05/23/2025 0924   Do you have or are you being treated for high blood pressure? 1 filed at 05/23/2025 0924   Recent BMI (Calculated) 27.3 filed at 05/23/2025 0924   Is BMI greater than 35 kg/m2? 0=No filed at 05/23/2025 0924   Age older than 50 years old? 1=Yes filed at 05/23/2025 0924   Is your neck circumference greater than 17 inches (Male) or 16 inches (Female)? 0 filed at 05/23/2025 0924   Gender - Male 0=No filed at 05/23/2025 0924   STOP-BANG Total Score 3 filed at 05/23/2025 0924          Prodigy: High Risk  Total Score: 3              Prodigy Previous Opioid Use Score           ARISCAT Score for Postoperative Pulmonary Complications      Flowsheet Row Pre-Admission Testing from 5/23/2025 in [facility]   Age Calculated Score 3 filed at 05/23/2025 0914   Preoperative SpO2 0 filed at 05/23/2025 0914   Respiratory infection in the last month Either upper or lower (i.e., URI, bronchitis, pneumonia), with fever and antibiotic treatment 0 filed at 05/23/2025 0914   Preoperative anemia (Hgb less than 10 g/dl) 0 filed at 05/23/2025 0914   Surgical incision  0 filed at 05/23/2025 0914   Duration of surgery  0 filed at 05/23/2025 0914   Emergency Procedure  0 filed at 05/23/2025 0914   ARISCAT Total Score  3 filed at 05/23/2025 0914          Lee Perioperative Risk for Myocardial Infarction or Cardiac Arrest (AMADOR)      Flowsheet Row Pre-Admission Testing from 5/23/2025 in [facility]   Calculated Age Score 1.06 filed at 05/23/2025 0914   Functional Status  0 filed at 05/23/2025 0914   ASA Class  -3.29 filed at 05/23/2025 0914    Creatinine 0 filed at 05/23/2025 0914   Type of Procedure  0.80 filed at 05/23/2025 0914   AMADOR Total Score  -6.68 filed at 05/23/2025 0914   AMADOR % 0.13 filed at 05/23/2025 0914            Assessment and Plan:     HPI 54 y/o female scheduled for total knee replacement-left on 6/9/2025 with  Dr. Nicolas secondary to chronic pain of left knee.  PMHX includes HTN, T2DM, GERD .  PAT is consulted today for perioperative risk stratification and optimization.    Seen by PCP 5/16/2025 - per note:  medically cleared    Neuro:  No neurologic diagnosis or significant findings on chart review, clinical presentation and evaluation.  No grossly apparent neurologic perioperative risk.    Patient is not at increased risk for perioperative CVA      HEENT:  No HEENT diagnosis or significant findings on chart review or clinical presentation and evaluation. No further preoperative testing/intervention indicated at this time.    Cardiovascular:  No CV diagnosis or significant findings on chart review or clinical presentation and evaluation. No further preoperative testing or intervention is indicated at this time.  METS: 8  RCRI: 0 points, 3.9%  risk for postoperative MACE       Pulmonary:  No pulmonary diagnosis or significant findings on chart review or clinical presentation.  No further preoperative testing is indicated at this time.  No CPAP since bariatric surgery  Stop Bang score is 3 placing patient at low risk for WALKER  PRODIGY: Low risk for opioid induced respiratory depression  Pumonary education discussed, patient also provided deep breathing exerciseswith educational handout    Renal:   No renal diagnosis, however patient is at increase risk for perioperative renal complications secondary to  Age equal to or greater than 56      Endocrine:  Since bariatric surgery - no meds for T2DM    Hematologic:  No hematologic diagnosis, however patient is at an increased risk for DVT  Caprini Score 9, patient at High risk for  perioperative DVT.  Patient provided with VTE education/handout.    Gastrointestinal:   No GI diagnosis or significant findings on chart review or clinical presentation and evaluation.   Apfel 3    Orthopedic Surgery  Seen by Dr. Nicolas on 3/28/2025 for chronic left knee pain  5/2024 -> A1c: 5.2  Plan for total left knee    Infectious disease:   No infectious diagnosis or significant findings on chart review or clinical presentation and evaluation.   Prescription provided for CHG body wash and dental rinse. CHG use instructions reviewed and provided to patient.  Staph screen collected    Musculoskeletal:   No diagnosis or significant findings on chart review or clinical presentation and evaluation.     Anesthesia/Airway:  Has delayed emergence      Medication instructions and NPO guidelines reviewed with the patient.  All questions or concerns discussed and addressed.      Labs and EKG ordered                  [1]   Past Medical History:  Diagnosis Date    Anxiety     Arthritis 11/29/2024    Delayed emergence from general anesthesia     Hypertension     Migraines     Sleep apnea     no longer since bariatric surgery    Thrombocytosis     Type 2 diabetes mellitus     Wears glasses    [2]   Past Surgical History:  Procedure Laterality Date    BARIATRIC SURGERY      BREAST BIOPSY Right 2017    benign    HYSTERECTOMY  2018    MR HEAD ANGIO WO IV CONTRAST  02/28/2022    MR HEAD ANGIO WO IV CONTRAST [facility]   [3]   Family History  Problem Relation Name Age of Onset    Hypertension Mother      Dilated cardiomyopathy Mother      Hypertension Father      Other (melanoma cancer) Father      Lung cancer Father      Breast cancer Maternal Grandmother     [4]   Allergies  Allergen Reactions    Adhesive Tape-Silicones Swelling

## 2025-05-23 NOTE — PREPROCEDURE INSTRUCTIONS
Thank you for visiting [facility]) today for your pre-procedure evaluation, you were seen by     Kaley Payan CNP  Pre Admission Testing  [facility]  [phone]    This summary includes instructions and information to aid you during your perioperative period.  Please read carefully. If you have any questions about your visit today, please call the number listed above.    If you become ill or have any changes to your health before your surgery, please contact your primary care provider and alert your surgeon.        Preparing for your Surgery       Exercises  Preoperative Deep Breathing Exercises  Why it is important to do deep breathing exercises before my surgery?  Deep breathing exercises strengthen your breathing muscles.  This helps you to recover after your surgery and decreases the chance of breathing complications.  How are the deep breathing exercises done?  Sit straight with your back supported.  Breathe in deeply and slowly through your nose. Your lower rib cage should expand and your abdomen may move forward.  Hold that breath for 3 to 5 seconds.  Breathe out through pursed lips, slowly and completely.  Rest and repeat 10 times every hour while awake.  Rest longer if you become dizzy or lightheaded.       Preoperative Brain Exercises    What are brain exercises?  A brain exercise is any activity that engages your thinking (cognitive) skills.    What types of activities are considered brain exercises?  Jigsaw puzzles, crossword puzzles, word jumble, memory games, word search, and many more.  Many can be found free online or on your phone via a mobile sarah.    Why should I do brain exercises before my surgery?  More recent research has shown brain exercise before surgery can lower the risk of postoperative delirium (confusion) which can be especially important for older adults.  Patients who did brain exercises for 5 to 10 hours the days before surgery, cut their  risk of postoperative delirium in half up to 1 week after surgery.    Sit-to-Stand Exercise    What is the sit-to-stand exercise?  The sit-to-stand exercise strengthens the muscles of your lower body and muscles in the center of your body (core muscles for stability) helping to maintain and improve your strength and mobility.  How do I do the sit-to-stand exercise?  The goal is to do this exercise without using your arms or hands.  If this is too difficult, use your arms and hands or a chair with armrests to help slowly push yourself to the standing position and lower yourself back to the sitting position. As the movement becomes easier use your arms and hands less.    Steps to the sit-to-stand exercise  Sit up tall in a sturdy chair, knees bent, feet flat on the floor shoulder-width apart.  Shift your hips/pelvis forward in the chair to correctly position yourself for the next movement.  Lean forward at your hips.  Stand up straight putting equal weight on both feet.  Check to be sure you are properly aligned with the chair, in a slow controlled movement sit back down.  Repeat this exercise 10-15 times.  If needed you can do it fewer times until your strength improves.  Rest for 1 minute.  Do another 10-15 sit-to-stand exercises.  Try to do this in the morning and evening.        Instructions    Preoperative Fasting Guidelines    Why must I stop eating and drinking near surgery time?  With sedation, food or liquid in your stomach can enter your lungs causing serious complications  Food can increase nausea and vomiting  When do I need to stop eating and drinking before my surgery?      Do not eat any food after midnight the night before your surgery/procedure. You may have up to 13.5 ounces of clear liquid until TWO hours before your instructed arrival time to the hospital.  This includes water, black tea/coffee, (no milk or cream) apple juice, and electrolyte drinks (Gatorade). You may chew gum until TWO hours  before your surgery/procedure            Simple things you can do to help prevent blood clots     Blood clots are blockages that can form in the body's veins. When a blood clot forms in your deep veins, it may be called a deep vein thrombosis, or DVT for short. Blood clots can happen in any part of the body where blood flows, but they are most common in the arms and legs. If a piece of a blood clot breaks free and travels to the lungs, it is called a pulmonary embolus (PE). A PE can be a very serious problem.         Being in the hospital or having surgery can raise your chances of getting a blood clot because you may not be well enough to move around as much as you normally do.         Ways you can help prevent blood clots in the hospital       Wearing SCDs  SCDs stands for Sequential Compression Devices.   SCDs are special sleeves that wrap around your legs. They attach to a pump that fills them with air to gently squeeze your legs every few minutes.  This helps return the blood in your legs to your heart.   SCDs should only be taken off when walking or bathing. SCDs may not be comfortable, but they can help save your life.              Pump SCD leg sleeves  Wearing compression stockings - if your doctor orders them. These special snug-fitting stockings gently squeeze your legs to help blood flow.       Walking. Walking helps move the blood in your legs.   If your doctor says it is ok, try walking the halls at least   5 times a day. Ask us to help you get up, so you don't fall.      Taking any blood-thinning medicines your doctor orders.              Ways you can help prevent blood clots at home         Wearing compression stockings - if your doctor orders them.   Walking - to help move the blood in your legs.    Taking any blood-thinning medicines your doctor orders.      Signs of a blood clot or PE    Tell your doctor or nurse right away if you have any of the problems listed below.         If you are at home,  "seek medical care right away. Call 911 for chest pain or problems breathing.            Signs of a blood clot (DVT) - such as pain, swelling, redness, or warmth in your arm or legs.  Signs of a pulmonary embolism (PE) - such as chest pain or feeling short of breath      Tobacco and Alcohol;  Do not drink alcohol or smoke within 24 hours of surgery.  It is best to quit smoking for as long as possible before any surgery or procedure.    Other Instructions  Why did I have my nose, under my arms, and groin swabbed? The purpose of the swab is to identify Staphylococcus aureus inside your nose or on your skin.  The swab was sent to the laboratory for culture.  A positive swab/culture for Staphylococcus aureus is called colonization or carriage.     What is Staphylococcus aureus? Staphylococcus aureus, also known as \"staph\", is a germ found on the skin or in the nose of healthy people.  Sometimes Staphylococcus aureus can get into the body and cause an infection.  This can be minor (such as pimples, boils, or other skin problems).  It might also be serious (such as a blood infection, pneumonia, or a surgical site infection).     What is Staphylococcus aureus colonization or carriage? Colonization or carriage means that a person has the germ but is not sick from it.  These bacteria can be spread on the hands or when breathing or sneezing.   How is Staphylococcus aureus spread? It is most often spread by close contact with a person or item that carries it.   What happens if my culture is positive for Staphylococcus aureus? Your doctor/medical team will use this information to guide any antibiotic treatment which may be necessary.  Regardless of the culture results, we will clean the inside of your nose with a betadine swab just before you have your surgery.   Will I get an infection if I have Staphylococcus aureus in my nose or on my skin? Anyone can get an infection with Staphylococcus aureus.  However, the best way to " reduce your risk of infection is to follow the instructions provided to you for the use of your CHG soap and dental rinse.      Body Wash:     What is a home preoperative antibacterial shower? This shower is a way of cleaning the skin with a germ-killing solution before surgery.  The solution contains chlorhexidine, commonly known as CHG.  CHG is a skin cleanser with germ-killing ability.  Let your doctor know if you are allergic to chlorhexidine.   Why do I need to take a preoperative antibacterial shower? Skin is not sterile.  It is best to try to make your skin as free of germs as possible before surgery.  Proper cleansing with a germ-killing soap before surgery can lower the number of germs on your skin.  This helps to reduce the risk of infection at the surgical site.    Following the instructions listed below will help you prepare your skin for surgery.   How do I use the solution?  If you plan to wash your hair, use your regular shampoo; then rinse your hair and body thoroughly to remove any shampoo residue  Wash your face with your regular soap or water only  Thoroughly rinse your body with water from the neck down  Apply Hibiclens directly on your skin or on a wet washcloth and wash gently; move away from the shower stream when applying Hibiclens to avoid rinsing it off too soon  Rinse thoroughly with warm water and keep out of eyes, ears and mouth; if Hibiclens comes in contact with these areas, rinse out promptly  Dry your skin with a towel  Do not use your regular soap after applying and rinsing with Hibiclens  Do not apply lotions or deodorants to the cleaned body area      Oral/Dental Rinse:     What is oral/dental rinse?  It is a mouthwash. It is a way of cleaning the mouth with a germ-killing solution before your surgery.  The solution contains chlorhexidine, commonly known as CHG. It is used inside the mouth to kill a bacteria known as Staphylococcus aureus.  Let your doctor know if you are allergic  to Chlorhexidine.   Why do I need to use CHG oral/dental rinse? The CHG oral/dental rinse helps to kill a bacteria in your mouth known as Staphylococcus aureus.  This reduces the risk of infection at the surgical site.    Using your CHG oral/dental rinse STEPS: Use your CHG oral/dental rinse after you brush your teeth the night before (at bedtime) and the morning of your surgery.  Follow all directions on your prescription label.    Use the cap on the container to measure 15 ml.  Swish (gargle if you can) the mouthwash in your mouth for at least 30 seconds, (do not swallow) and spit out.  After you use your CHG rinse, do not rinse your mouth with water, drink or eat.    Please refer to the prescription label for the appropriate time to resume oral intake   What side effects might I have using the CHG oral/dental rinse? CHG rinse will stick to plaque on the teeth.  Brush and floss just before use.  Teeth brushing will help avoid staining of plaque during use.        The Week before Surgery        Seven days before Surgery  Check your PAT medication instructions  Do the exercises provided to you by [facility]  Arrange for a responsible, adult licensed  to take you home after surgery and stay with you for 24 hours.  You will not be permitted to drive yourself home if you have received any anesthetic/sedation  Six days before surgery  Check your PAT medication instructions  Do the exercises provided to you by PAT  Start using Chlorhexidene (CHG) body wash if prescribed  Five days before surgery  Check your PAT medication instructions  Do the exercises provided to you by PAT  Continue to use CHG body wash if prescribed  Three days before surgery  Check your PAT medication instructions  Do the exercises provided to you by PAT  Continue to use CHG body wash if prescribed  Two days before surgery  Check your PAT medication instructions  Do the exercises provided to you by [facility]  Continue to use CHG body wash if  prescribed    The Day before Surgery       Check your PAT medication and all other PAT instructions including when to stop eating and drinking  You will be called with your arrival time for surgery in the late afternoon.  If you do not receive a call please reach out to [facility] [phone]  Do not smoke or drink 24 hours before surgery  Prepare items to bring with you to the hospital  Shower with your chlorhexidine wash if prescribed  Brush your teeth and use your chlorhexidine dental rinse if prescribed    The Day of Surgery       Check your PAT medication instructions  Ensure you follow the instructions for when to stop eating and drinking  Shower, if prescribed use CHG.  Do not apply any lotions, creams, moisturizers, perfume or deodorant  Brush your teeth and use your CHG dental rinse if prescribed  Wear loose comfortable clothing  Avoid make-up  Remove  jewelry and piercings, consider professional piercing removal with a plastic spacer if needed  Bring photo ID and Insurance card  Bring an accurate medication list that includes medication dose, frequency and allergies  Bring a copy of your advanced directives (will, health care power of )  Bring any devices and controllers as well as medical devices you have been provided with for surgery (CPAP, slings, braces, etc.)  Dentures, eyeglasses, and contacts will be removed before surgery, please bring cases for contacts or glasses

## 2025-05-23 NOTE — CPM/PAT H&P
CPM/PAT Evaluation       Name: Odessa Thompson (Odessa Thompson)  /Age: 1970/55 y.o.     Visit Type:   In-Person       Chief Complaint: Chronic pain of left knee    HPI 56 y/o female scheduled for total knee replacement-left on 2025 with  Dr. Nicolas secondary to chronic pain of left knee.  PMHX includes HTN, T2DM, GERD .  PAT is consulted today for perioperative risk stratification and optimization.      Medical History[1]    Surgical History[2]    Patient Sexual activity questions deferred to the physician.    Family History[3]    Allergies[4]    Prior to Admission medications    Medication Sig Start Date End Date Taking? Authorizing Provider   acetaminophen (Tylenol 8 HOUR) 650 mg ER tablet Take 1 tablet (650 mg) by mouth every 8 hours if needed for mild pain (1 - 3). Do not crush, chew, or split.    Historical Provider, MD   b complex 0.4 mg tablet Take 1 tablet by mouth once daily.    Historical Provider, MD   calcium carbonate 500 mg calcium (1,250 mg) chewable tablet Chew 1 tablet (500 mg of elemental calcium) once daily.    Historical Provider, MD   escitalopram (Lexapro) 20 mg tablet Take 1 tablet (20 mg) by mouth once daily. 8/15/24   Historical Provider, MD   famotidine (Pepcid) 40 mg tablet Take 1 tablet (40 mg) by mouth 2 times a day as needed for heartburn. 5/15/25   Deb Raymond MD   melatonin 5 mg tablet Take 2 tablets (10 mg) by mouth once daily. 5/15/25   Deb Raymond MD   multivitamin tablet Take 1 tablet by mouth once daily.    Historical Provider, MD   omeprazole (PriLOSEC) 40 mg DR capsule Take 1 capsule (40 mg) by mouth once daily in the morning. Take before meals. Do not crush or chew. 5/15/25   Deb Raymond MD   ondansetron ODT (Zofran-ODT) 4 mg disintegrating tablet 1 tablet (4 mg). 23   Historical Provider, MD   oxyCODONE (Roxicodone) 5 mg immediate release tablet Take 1 tablet (5 mg) by mouth every 8 hours if needed for severe pain (7 - 10). 25   Deb  MD Segundo   rizatriptan (Maxalt) 10 mg tablet rizatriptan (MAXALT) 10 mg tablet Indications: Intractable migraine without aura and without status migrainosus Take 1 dose at onset of migraine, can take second dose if headache persists after 2 hours 30 Tablet 2 10/12/2023 Active 10/12/23   Historical Provider, MD   oxyCODONE (Roxicodone) 5 mg immediate release tablet Take 1 tablet (5 mg) by mouth every 6 hours if needed. 12/17/24 5/19/25  Historical Provider, MD FARNS[place]TH ROS:   Constitutional:   neg    Neuro/Psych:   neg    Eyes:    R worse than L eye   no discharge   no vision loss   use of corrective lenses  Ears:   neg    Nose:   Mouth:   Throat:   Neck:   Cardio:   neg    Respiratory:   neg    Endocrine:   GI:   neg    :   neg    Musculoskeletal:   neg    Hematologic:   neg    Skin:      Physical Exam  Vitals reviewed.   Constitutional:       Appearance: Normal appearance.   HENT:      Head: Normocephalic and atraumatic.      Mouth/Throat:      Mouth: Mucous membranes are moist.      Pharynx: Oropharynx is clear.   Eyes:      Extraocular Movements: Extraocular movements intact.   Cardiovascular:      Rate and Rhythm: Normal rate and regular rhythm.   Pulmonary:      Effort: Pulmonary effort is normal.      Breath sounds: Normal breath sounds.   Abdominal:      General: Bowel sounds are normal.      Palpations: Abdomen is soft.   Musculoskeletal:         General: Normal range of motion.      Cervical back: Normal range of motion.   Skin:     General: Skin is warm and dry.   Neurological:      General: No focal deficit present.      Mental Status: She is alert and oriented to person, place, and time.   Psychiatric:         Mood and Affect: Mood normal.         Behavior: Behavior normal.          Airway    Testing/Diagnostic:     Patient Specialist/PCP:     Visit Vitals  /81   Pulse 57   Temp 35.9 °C (96.6 °F)   Resp 18   Ht 1.524 m (5')   Wt 66.9 kg (147 lb 7.8 oz)   SpO2 99%   BMI 28.80 kg/m²   OB  Status Postmenopausal   Smoking Status Never   BSA 1.68 m²       DASI Risk Score      Flowsheet Row Pre-Admission Testing from 5/23/2025 in [facility]   Can you take care of yourself (eat, dress, bathe, or use toilet)?  2.75 filed at 05/23/2025 0924   Can you walk indoors, such as around your house? 1.75 filed at 05/23/2025 0924   Can you walk a block or two on level ground?  2.75 filed at 05/23/2025 0924   Can you climb a flight of stairs or walk up a hill? 5.5 filed at 05/23/2025 0924   Can you run a short distance? 0 filed at 05/23/2025 0924   Can you do light work around the house like dusting or washing dishes? 2.7 filed at 05/23/2025 0924   Can you do moderate work around the house like vacuuming, sweeping floors or carrying groceries? 3.5 filed at 05/23/2025 0924   Can you do heavy work around the house like scrubbing floors or lifting and moving heavy furniture?  8 filed at 05/23/2025 0924   Can you do yard work like raking leaves, weeding or pushing a mower? 4.5 filed at 05/23/2025 0924   Can you have sexual relations? 5.25 filed at 05/23/2025 0924   Can you participate in moderate recreational activities like golf, bowling, dancing, doubles tennis or throwing a baseball or football? 6 filed at 05/23/2025 0924   Can you participate in strenous sports like swimming, singles tennis, football, basketball, or skiing? 0 filed at 05/23/2025 0924   DASI SCORE 42.7 filed at 05/23/2025 0924   METS Score (Will be calculated only when all the questions are answered) 8 filed at 05/23/2025 0924          Caprini DVT Assessment      Flowsheet Row Pre-Admission Testing from 5/23/2025 in [facility]   DVT Score (IF A SCORE IS NOT CALCULATING, MUST SELECT A BMI TO COMPLETE) 9 filed at 05/23/2025 0955   Surgical Factors Major surgery planned, including arthroscopic and laproscopic (1-2 hours), Elective major lower extremity arthroplasty filed at 05/23/2025 0955   BMI (BMI MUST BE CHOSEN) 30 or less  filed at 05/23/2025 0955          Modified Frailty Index    No data to display       [id number] Stroke Risk Points  Current as of just now        N/A 0 to 9 Points:      Last Change: N/A          The [id number] risk score (Daniel BONDS, et al. 2009. © 2010 [employer]) quantifies the risk of stroke for a patient with atrial fibrillation. For patients without atrial fibrillation or under the age of 18 this score appears as N/A. Higher score values generally indicate higher risk of stroke.        This score is not applicable to this patient. Components are not calculated.          Revised Cardiac Risk Index      Flowsheet Row Pre-Admission Testing from 5/23/2025 in [facility]   High-Risk Surgery (Intraperitoneal, Intrathoracic,Suprainguinal vascular) 0 filed at 05/23/2025 0913   History of ischemic heart disease (History of MI, History of positive exercuse test, Current chest paint considered due to myocardial ischemia, Use of nitrate therapy, ECG with pathological Q Waves) 0 filed at 05/23/2025 0913   History of congestive heart failure (pulmonary edemia, bilateral rales or S3 gallop, Paroxysmal nocturnal dyspnea, CXR showing pulmonary vascular redistribution) 0 filed at 05/23/2025 0913   History of cerebrovascular disease (Prior TIA or stroke) 0 filed at 05/23/2025 0913   Pre-operative insulin treatment 0 filed at 05/23/2025 0913   Pre-operative creatinine>2 mg/dl 0 filed at 05/23/2025 0913   Revised Cardiac Risk Calculator 0 filed at 05/23/2025 0913          Apfel Simplified Score      Flowsheet Row Pre-Admission Testing from 5/23/2025 in [facility]   Smoking status 1 filed at 05/23/2025 0914   History of motion sickness or PONV  0 filed at 05/23/2025 0914   Use of postoperative opioids 1 filed at 05/23/2025 0914   Gender - Female 1=Yes filed at 05/23/2025 0914   Apfel Simplified Score Calculator 3 filed at 05/23/2025 0914          Risk Analysis Index Results This  Encounter    No data found in the last 10 encounters.       Stop Bang Score      Flowsheet Row Pre-Admission Testing from 5/23/2025 in [facility]   Do you snore loudly? 0 filed at 05/23/2025 0924   Do you often feel tired or fatigued after your sleep? 0 filed at 05/23/2025 0924   Has anyone ever observed you stop breathing in your sleep? 1 filed at 05/23/2025 0924   Do you have or are you being treated for high blood pressure? 1 filed at 05/23/2025 0924   Recent BMI (Calculated) 27.3 filed at 05/23/2025 0924   Is BMI greater than 35 kg/m2? 0=No filed at 05/23/2025 0924   Age older than 50 years old? 1=Yes filed at 05/23/2025 0924   Is your neck circumference greater than 17 inches (Male) or 16 inches (Female)? 0 filed at 05/23/2025 0924   Gender - Male 0=No filed at 05/23/2025 0924   STOP-BANG Total Score 3 filed at 05/23/2025 0924          Prodigy: High Risk  Total Score: 3              Prodigy Previous Opioid Use Score           ARISCAT Score for Postoperative Pulmonary Complications      Flowsheet Row Pre-Admission Testing from 5/23/2025 in [facility]   Age Calculated Score 3 filed at 05/23/2025 0914   Preoperative SpO2 0 filed at 05/23/2025 0914   Respiratory infection in the last month Either upper or lower (i.e., URI, bronchitis, pneumonia), with fever and antibiotic treatment 0 filed at 05/23/2025 0914   Preoperative anemia (Hgb less than 10 g/dl) 0 filed at 05/23/2025 0914   Surgical incision  0 filed at 05/23/2025 0914   Duration of surgery  0 filed at 05/23/2025 0914   Emergency Procedure  0 filed at 05/23/2025 0914   ARISCAT Total Score  3 filed at 05/23/2025 0914          Lee Perioperative Risk for Myocardial Infarction or Cardiac Arrest (AMADOR)      Flowsheet Row Pre-Admission Testing from 5/23/2025 in [facility]   Calculated Age Score 1.06 filed at 05/23/2025 0914   Functional Status  0 filed at 05/23/2025 0914   ASA Class  -3.29 filed at 05/23/2025 0914    Creatinine 0 filed at 05/23/2025 0914   Type of Procedure  0.80 filed at 05/23/2025 0914   AMADOR Total Score  -6.68 filed at 05/23/2025 0914   AMADOR % 0.13 filed at 05/23/2025 0914            Assessment and Plan:     HPI 56 y/o female scheduled for total knee replacement-left on 6/9/2025 with  Dr. Nicolas secondary to chronic pain of left knee.  PMHX includes HTN, T2DM, GERD .  PAT is consulted today for perioperative risk stratification and optimization.    Seen by PCP 5/16/2025 - per note:  medically cleared    Neuro:  No neurologic diagnosis or significant findings on chart review, clinical presentation and evaluation.  No grossly apparent neurologic perioperative risk.    Patient is not at increased risk for perioperative CVA      HEENT:  No HEENT diagnosis or significant findings on chart review or clinical presentation and evaluation. No further preoperative testing/intervention indicated at this time.    Cardiovascular:  No CV diagnosis or significant findings on chart review or clinical presentation and evaluation. No further preoperative testing or intervention is indicated at this time.  METS: 8  RCRI: 0 points, 3.9%  risk for postoperative MACE       Pulmonary:  No pulmonary diagnosis or significant findings on chart review or clinical presentation.  No further preoperative testing is indicated at this time.  No CPAP since bariatric surgery  Stop Bang score is 3 placing patient at low risk for WALKER  PRODIGY: Low risk for opioid induced respiratory depression  Pumonary education discussed, patient also provided deep breathing exerciseswith educational handout    Renal:   No renal diagnosis, however patient is at increase risk for perioperative renal complications secondary to  Age equal to or greater than 56      Endocrine:  Since bariatric surgery - no meds for T2DM    Hematologic:  No hematologic diagnosis, however patient is at an increased risk for DVT  Caprini Score 9, patient at High risk for  perioperative DVT.  Patient provided with VTE education/handout.    Gastrointestinal:   No GI diagnosis or significant findings on chart review or clinical presentation and evaluation.   Apfel 3    Orthopedic Surgery  Seen by Dr. Nicolas on 3/28/2025 for chronic left knee pain  5/2024 -> A1c: 5.2  Plan for total left knee    Infectious disease:   No infectious diagnosis or significant findings on chart review or clinical presentation and evaluation.   Prescription provided for CHG body wash and dental rinse. CHG use instructions reviewed and provided to patient.  Staph screen collected    Musculoskeletal:   No diagnosis or significant findings on chart review or clinical presentation and evaluation.     Anesthesia/Airway:  Has delayed emergence      Medication instructions and NPO guidelines reviewed with the patient.  All questions or concerns discussed and addressed.      Labs and EKG ordered                  [1]   Past Medical History:  Diagnosis Date    Anxiety     Arthritis 11/29/2024    Delayed emergence from general anesthesia     Hypertension     Migraines     Sleep apnea     no longer since bariatric surgery    Thrombocytosis     Type 2 diabetes mellitus     Wears glasses    [2]   Past Surgical History:  Procedure Laterality Date    BARIATRIC SURGERY      BREAST BIOPSY Right 2017    benign    HYSTERECTOMY  2018    MR HEAD ANGIO WO IV CONTRAST  02/28/2022    MR HEAD ANGIO WO IV CONTRAST [facility]   [3]   Family History  Problem Relation Name Age of Onset    Hypertension Mother      Dilated cardiomyopathy Mother      Hypertension Father      Other (melanoma cancer) Father      Lung cancer Father      Breast cancer Maternal Grandmother     [4]   Allergies  Allergen Reactions    Adhesive Tape-Silicones Swelling

## 2025-05-25 LAB — STAPHYLOCOCCUS SPEC CULT: ABNORMAL

## 2025-05-29 ENCOUNTER — APPOINTMENT (OUTPATIENT)
Dept: ORTHOPEDIC SURGERY | Facility: CLINIC | Age: 55
End: 2025-05-29
Payer: COMMERCIAL

## 2025-05-30 NOTE — PRE-PROCEDURE NOTE
Pre Surgery Discharge Planning :    Procedure: knee replacement      Date of procedure: 6/9/2025      Address you will be discharged to:  [id number]   [place] [address]     Care Partner:  En (friend)     Current mobility status: no mobility aides used      Stairs into house: 4-5    Stairs inside house: 0    DME:   Walker, cane and ice cooler    Joint Class: 5/28/2025        Same Day Surgery: Yes

## 2025-06-02 ENCOUNTER — TELEPHONE (OUTPATIENT)
Dept: OTOLARYNGOLOGY | Facility: CLINIC | Age: 55
End: 2025-06-02
Payer: COMMERCIAL

## 2025-06-02 DIAGNOSIS — E04.1 THYROID NODULE: Primary | ICD-10-CM

## 2025-06-02 LAB
ATRIAL RATE: 60 BPM
P AXIS: 50 DEGREES
P OFFSET: 202 MS
P ONSET: 153 MS
PR INTERVAL: 136 MS
Q ONSET: 221 MS
QRS COUNT: 10 BEATS
QRS DURATION: 82 MS
QT INTERVAL: 382 MS
QTC CALCULATION(BAZETT): 382 MS
QTC FREDERICIA: 382 MS
R AXIS: 26 DEGREES
T AXIS: 36 DEGREES
T OFFSET: 412 MS
VENTRICULAR RATE: 60 BPM

## 2025-06-02 NOTE — TELEPHONE ENCOUNTER
Patient was last treated in the office on 7/2024 for thyroid check, can you please place an order for her yearly thyroid ultrasound? Thank you.

## 2025-06-05 PROCEDURE — RXMED WILLOW AMBULATORY MEDICATION CHARGE

## 2025-06-05 RX ORDER — NAPROXEN 500 MG/1
500 TABLET ORAL
Qty: 60 TABLET | Refills: 0 | Status: SHIPPED | OUTPATIENT
Start: 2025-06-05 | End: 2025-07-09

## 2025-06-05 RX ORDER — DOCUSATE SODIUM 100 MG/1
100 CAPSULE, LIQUID FILLED ORAL 2 TIMES DAILY
Qty: 20 CAPSULE | Refills: 0 | Status: SHIPPED | OUTPATIENT
Start: 2025-06-05 | End: 2025-06-19

## 2025-06-05 RX ORDER — OXYCODONE AND ACETAMINOPHEN 5; 325 MG/1; MG/1
1 TABLET ORAL EVERY 4 HOURS PRN
Qty: 36 TABLET | Refills: 0 | Status: SHIPPED | OUTPATIENT
Start: 2025-06-05 | End: 2025-06-12

## 2025-06-05 RX ORDER — POLYETHYLENE GLYCOL 3350 17 G/17G
17 POWDER, FOR SOLUTION ORAL DAILY
Qty: 238 G | Refills: 0 | Status: SHIPPED | OUTPATIENT
Start: 2025-06-05

## 2025-06-05 RX ORDER — NAPROXEN SODIUM 220 MG/1
81 TABLET, FILM COATED ORAL 2 TIMES DAILY
Qty: 56 TABLET | Refills: 0 | Status: SHIPPED | OUTPATIENT
Start: 2025-06-05 | End: 2025-07-07

## 2025-06-05 RX ORDER — CEFADROXIL 500 MG/1
500 CAPSULE ORAL 2 TIMES DAILY
Qty: 14 CAPSULE | Refills: 0 | Status: SHIPPED | OUTPATIENT
Start: 2025-06-05 | End: 2025-06-16

## 2025-06-05 NOTE — DISCHARGE INSTRUCTIONS
TOTAL HIP AND KNEE REPLACEMENT DISCHARGE INSTRUCTIONS  Lowell Nicolas MD      DRIVING & TRAVEL AFTER SURGERY   Patients should anticipate waiting at least 4-6 weeks before traveling long distances after surgery.  You will need to stop to walk around ever 1 hour during your travel to help with blood clot prevention.  Please call the office or your joint nurse to discuss prior to post-surgical travel.  Patients may not drive until cleared by the joint nurse or the office.    DENTAL PROCEDURES & CLEANINGS  You must wait a minimum of 3 months for elective dental appointments, including routine cleanings or dental work including bridges, crowns, extractions, etc..  For any dental visit - cleaning or dental procedures - patients must take an antibiotic 1 hour before the appointment.  Antibiotics are a lifelong need before dental appointments.  The antibiotic prescribed will be based on each patient's allergies.    WOUND CARE  If you experience continued drainage or bleeding, you may cover with abdominal pads (purchase at local drug store).  Knee replacements should wrap with an ace wrap.  Do not remove surgical dressing, it will be removed when you arrive in clinic for follow up visit.   Mesh dressing under the bandage will remain in place until it peels off on its own.  If it is loose, you may gently remove.  Do not remove if pulling causes resistance against the incision.      PAIN, SWELLING, BRUISING & CLICKING  Pain and swelling are a natural part of your recovery which is considered normal fur up to a year after surgery.  Symptoms may be treated with movement, ice, compression stockings, elevating your leg, and by following the pain medication regimen as prescribed.  Bruising is normal for several weeks after surgery and will run down the leg over time.  You may ice areas that are tender to help with discomfort.  You are required to wear the provided compression stockings, every day, for 4 weeks following surgery.   Remove the stockings at night and place them back on in the morning.  Pain and swelling may temporarily increase with an increase in activity or exercise.  Use ice after activity.  Audible clicking with movement or exercises is considered normal following joint replacement.  You may also feel decreased sensation or numbness near the incision site.  These are usually normal and may or may not fade over time.     PERSONAL HYGIENE  You may shower upon discharging from the hospital.  Soap and water is permitted to run over the surgical dressing, steri-strips and incision.  Do not scrub directly over these items.  DO NOT soak your incision in a bath, hot tub, pool or pond/lake for a minimum of 8 weeks following your surgery.  DO NOT use lotions, creams, ointments on your wound for a minimum of 6 weeks following your surgery. At that time you may use vitamin E to assist with softening of your incision.      RESTARTING HOME ROUTINE - DIET & MEDICATIONS  Post-operative constipation can result due to a combination of inactivity, anesthesia and pain medication. To help prevent this, you should increase your water and fiber intake. Physical activity such as walking will also help stimulate the bowels.   You may resume your normal diet when you discharge home.  Choose foods that help promote good bowel habits and prevent constipation, such as foods high in fiber.  You may restart your home medications the following day after your surgery UNLESS you have been given alternate instructions.  Follow the instructions given to you on your hospital discharge instructions for more information regarding your home medications.      IN-HOME PHYSICAL THERAPY & OUTPATIENT PHYSICAL THERAPY  In-home physical therapy will start 1-2 days after you get home from the hospital.    The home care agency will call within the first 24-48 hours to set up their first visit.  Please do not call your care team to inquire during this timeframe.  Continue  the exercises you were given in the hospital until you have been seen by in-home therapy.  Make sure to provide a phone number with the ability for the home care staff to leave a message if you do not answer your phone.    Outpatient physical therapy following knee replacement surgery should begin 2-3 weeks after surgery.  You should call to schedule this appointment ASAP if not already scheduled before surgery.  Waiting until you are ready for outpatient physical therapy will cause a delay in your care.  You may choose any outpatient physical therapy location.  Call the office for an order if needed.    EMERGENCIES - WHEN TO CONTACT THE SURGEON'S OFFICE IMMEDIATELY  Fever >101 with chills that has been present for at least 48 hours.   Excessive bleeding from incision that will not slow down. A small amount of drainage is normal and expected.  Once pressure is applied and the area is covered, do not continue to check the area regularly.  This will remove pressure and bleeding will continue.  Leave in place for 4-6 hours.  Signs of infection of incision-excessive drainage that is soaking through your dressing (especially if it is pus-like), redness that is spreading out from the edges of your incision, or increased warmth around the area.  Excruciating pain for which the pain medication, taken as instructed, is not helping.  Severe calf pain.  Go directly to the emergency room or call 911, if you are experiencing chest pain or difficulty breathing.    ICE & COLD THERAPY INSTRUCTIONS    To assist with pain control and post-op swelling, you should be using ice regularly throughout recovery, especially for the first 6 weeks, regardless of the cold therapy method you use.      Always make sure there is a layer of protection between the cold pad and your skin.    If you are using ICE PACKS or GEL PACKS, you will need to alternate 20 minutes on, 20 minutes off twice per hour.    If you are using an ICE MACHINE, please  follow the provided ice machine instructions.  These devices differ from ice or ice packs whereas the mechanism circulates water through tubing and a pad to provide longer periods of cold therapy to the desired site.  You can use your cold devices around the clock for optimal comfort.  We recommend using cold therapy after working with therapy or completing exercises on your own.  There is no set schedule in which you must follow while using cold therapy.  Below are a few points to remember when using a cold therapy device:    You do not need to need to use the 20 on, 20 off method.  Detach the pad from the cooler and ambulate at least once every hour.  You can check your skin under the pad at this time.  You may wear the cold therapy device during periods of sleep including overnight.  If you wake up during the night, you can check the skin at this time.  You do not need to wake up specifically to perform skin checks.  Empty the cooler and pad when device is not in use.  Follow 's instructions for cleaning your cold therapy device.      DISCHARGE MEDICATIONS    PAIN MEDICATION    __X__ Oxycodone-acetaminophen  Oxycodone-acetaminophen has been prescribed for post-operative pain control.    These medications may only be refilled if neededONCE every 7 days for a period of up to 6 weeks following surgery.  After 6 weeks, you will transition to acetaminophen and over -the- counter anti-inflammatories such as Ibuprofen, Advil or Aleve in conjunction with ICE/COLD THERAPY.   Side effects may be constipation and nausea, vomiting, sleepiness, dizziness, lightheadedness, headache, blurred vision, dry mouth sweating, itching (if you have itching, over-the -counter Benadryl can be used as needed).  You may NOT operate a motor vehicle while taking these medications or have been cleared by your care team.    Please call the office for a refill request.  The office staff and orthopedic nurses cannot refill  medications; messages should be left directly through the office.  Please do not call multiple times or call other members of the care team for medication needs, this will cause the refill to take longer.    Per State and Institutional policy, pain medications can only be refilled every 7 days for up to six weeks following surgery.   .    ____X___ Naproxen has been prescribed as an adjunct anti-inflammatory to assist in pain control.    Take one 500 mg tablet twice a day for 4 weeks.  You will not receive refills on this medication.   Side effects may include nausea.  May not be prescribed if you are on a more potent blood thinner than aspirin or have chronic kidney disease.    BLOOD THINNER    __X__ Blood Thinner   A blood thinner has been prescribed to prevent blood clots in your leg or lungs. Take as prescribed on the bottle for 4 weeks. You will not receive a refill on this medication.      STOOL SOFTENERS    __X__ Colace (Docusate Sodium)   Take this medication to help with constipation while using the Oxycodone for pain control.  You will not receive a refill on this medication.  If you have not had a bowel movement for 2 days after surgery if you feel painful constipation, you may try over-the-counter Miralax or a suppository. If you still are not able to have a bowel movement, call our office or discuss with your PCP. If you have preexisting constipation or other GI issues that may predispose you to constipation, please consult with your GI provider or PCP if you have not had a bowel movement for 2 days after surgery.     You can also use miralax, dulcolax, milk of magnesia as needed for constipation prevention.     Antibiotics    __X__ Cefadroxil or doxycycline  May be prescribed if needed for prophylaxis against infection   Take 7 day course of antibiotics until they are all gone  May not be prescribed if you do not meet criteria for elevated infection risk after surgery          You will not receive  refills on the following medications:    Naproxen  Colace  Blood Thinner

## 2025-06-09 ENCOUNTER — APPOINTMENT (OUTPATIENT)
Dept: RADIOLOGY | Facility: HOSPITAL | Age: 55
End: 2025-06-09
Payer: COMMERCIAL

## 2025-06-09 ENCOUNTER — HOSPITAL ENCOUNTER (OUTPATIENT)
Facility: HOSPITAL | Age: 55
Discharge: NURSING, REHAB OR LONG-TERM CARE | End: 2025-06-12
Attending: ORTHOPAEDIC SURGERY | Admitting: ORTHOPAEDIC SURGERY
Payer: COMMERCIAL

## 2025-06-09 ENCOUNTER — ANESTHESIA (OUTPATIENT)
Dept: OPERATING ROOM | Facility: HOSPITAL | Age: 55
End: 2025-06-09
Payer: COMMERCIAL

## 2025-06-09 ENCOUNTER — PHARMACY VISIT (OUTPATIENT)
Dept: PHARMACY | Facility: CLINIC | Age: 55
End: 2025-06-09
Payer: MEDICAID

## 2025-06-09 ENCOUNTER — DOCUMENTATION (OUTPATIENT)
Dept: HOME HEALTH SERVICES | Facility: HOME HEALTH | Age: 55
End: 2025-06-09

## 2025-06-09 ENCOUNTER — HOME HEALTH ADMISSION (OUTPATIENT)
Dept: HOME HEALTH SERVICES | Facility: HOME HEALTH | Age: 55
End: 2025-06-09
Payer: COMMERCIAL

## 2025-06-09 DIAGNOSIS — M25.562 CHRONIC PAIN OF LEFT KNEE: Primary | ICD-10-CM

## 2025-06-09 DIAGNOSIS — G89.29 CHRONIC PAIN OF LEFT KNEE: Primary | ICD-10-CM

## 2025-06-09 DIAGNOSIS — M17.12 OSTEOARTHRITIS OF LEFT KNEE, UNSPECIFIED OSTEOARTHRITIS TYPE: ICD-10-CM

## 2025-06-09 LAB
GLUCOSE BLD MANUAL STRIP-MCNC: 69 MG/DL (ref 74–99)
PREGNANCY TEST URINE, POC: NEGATIVE

## 2025-06-09 PROCEDURE — A27447 PR TOTAL KNEE ARTHROPLASTY: Performed by: NURSE ANESTHETIST, CERTIFIED REGISTERED

## 2025-06-09 PROCEDURE — 2500000004 HC RX 250 GENERAL PHARMACY W/ HCPCS (ALT 636 FOR OP/ED): Performed by: ORTHOPAEDIC SURGERY

## 2025-06-09 PROCEDURE — 2500000004 HC RX 250 GENERAL PHARMACY W/ HCPCS (ALT 636 FOR OP/ED): Mod: JZ | Performed by: NURSE PRACTITIONER

## 2025-06-09 PROCEDURE — 73560 X-RAY EXAM OF KNEE 1 OR 2: CPT | Mod: LEFT SIDE | Performed by: RADIOLOGY

## 2025-06-09 PROCEDURE — 7100000001 HC RECOVERY ROOM TIME - INITIAL BASE CHARGE: Performed by: ORTHOPAEDIC SURGERY

## 2025-06-09 PROCEDURE — 88311 DECALCIFY TISSUE: CPT | Performed by: PATHOLOGY

## 2025-06-09 PROCEDURE — 7100000011 HC EXTENDED STAY RECOVERY HOURLY - NURSING UNIT

## 2025-06-09 PROCEDURE — 2500000004 HC RX 250 GENERAL PHARMACY W/ HCPCS (ALT 636 FOR OP/ED): Performed by: NURSE ANESTHETIST, CERTIFIED REGISTERED

## 2025-06-09 PROCEDURE — C1776 JOINT DEVICE (IMPLANTABLE): HCPCS | Performed by: ORTHOPAEDIC SURGERY

## 2025-06-09 PROCEDURE — 64447 NJX AA&/STRD FEMORAL NRV IMG: CPT | Performed by: STUDENT IN AN ORGANIZED HEALTH CARE EDUCATION/TRAINING PROGRAM

## 2025-06-09 PROCEDURE — 27447 TOTAL KNEE ARTHROPLASTY: CPT | Performed by: ORTHOPAEDIC SURGERY

## 2025-06-09 PROCEDURE — 3600000017 HC OR TIME - EACH INCREMENTAL 1 MINUTE - PROCEDURE LEVEL SIX: Performed by: ORTHOPAEDIC SURGERY

## 2025-06-09 PROCEDURE — A9999 DME SUPPLY OR ACCESSORY, NOS: HCPCS | Performed by: ORTHOPAEDIC SURGERY

## 2025-06-09 PROCEDURE — A6213 FOAM DRG >16<=48 SQ IN W/BDR: HCPCS | Performed by: ORTHOPAEDIC SURGERY

## 2025-06-09 PROCEDURE — 88305 TISSUE EXAM BY PATHOLOGIST: CPT | Mod: TC,GEALAB | Performed by: ORTHOPAEDIC SURGERY

## 2025-06-09 PROCEDURE — 88305 TISSUE EXAM BY PATHOLOGIST: CPT | Performed by: PATHOLOGY

## 2025-06-09 PROCEDURE — 82947 ASSAY GLUCOSE BLOOD QUANT: CPT

## 2025-06-09 PROCEDURE — 7100000002 HC RECOVERY ROOM TIME - EACH INCREMENTAL 1 MINUTE: Performed by: ORTHOPAEDIC SURGERY

## 2025-06-09 PROCEDURE — 73560 X-RAY EXAM OF KNEE 1 OR 2: CPT | Mod: LT

## 2025-06-09 PROCEDURE — 81025 URINE PREGNANCY TEST: CPT | Performed by: ORTHOPAEDIC SURGERY

## 2025-06-09 PROCEDURE — 94760 N-INVAS EAR/PLS OXIMETRY 1: CPT

## 2025-06-09 PROCEDURE — 2500000005 HC RX 250 GENERAL PHARMACY W/O HCPCS: Performed by: ANESTHESIOLOGY

## 2025-06-09 PROCEDURE — A27447 PR TOTAL KNEE ARTHROPLASTY: Performed by: STUDENT IN AN ORGANIZED HEALTH CARE EDUCATION/TRAINING PROGRAM

## 2025-06-09 PROCEDURE — 2720000007 HC OR 272 NO HCPCS: Performed by: ORTHOPAEDIC SURGERY

## 2025-06-09 PROCEDURE — 2500000005 HC RX 250 GENERAL PHARMACY W/O HCPCS: Performed by: ORTHOPAEDIC SURGERY

## 2025-06-09 PROCEDURE — 3600000018 HC OR TIME - INITIAL BASE CHARGE - PROCEDURE LEVEL SIX: Performed by: ORTHOPAEDIC SURGERY

## 2025-06-09 PROCEDURE — 3700000001 HC GENERAL ANESTHESIA TIME - INITIAL BASE CHARGE: Performed by: ORTHOPAEDIC SURGERY

## 2025-06-09 PROCEDURE — 2500000001 HC RX 250 WO HCPCS SELF ADMINISTERED DRUGS (ALT 637 FOR MEDICARE OP): Performed by: NURSE PRACTITIONER

## 2025-06-09 PROCEDURE — 2500000004 HC RX 250 GENERAL PHARMACY W/ HCPCS (ALT 636 FOR OP/ED): Mod: JZ | Performed by: ANESTHESIOLOGY

## 2025-06-09 PROCEDURE — 2500000001 HC RX 250 WO HCPCS SELF ADMINISTERED DRUGS (ALT 637 FOR MEDICARE OP): Performed by: ORTHOPAEDIC SURGERY

## 2025-06-09 PROCEDURE — 2780000003 HC OR 278 NO HCPCS: Performed by: ORTHOPAEDIC SURGERY

## 2025-06-09 PROCEDURE — 99253 IP/OBS CNSLTJ NEW/EST LOW 45: CPT | Performed by: INTERNAL MEDICINE

## 2025-06-09 PROCEDURE — 3700000002 HC GENERAL ANESTHESIA TIME - EACH INCREMENTAL 1 MINUTE: Performed by: ORTHOPAEDIC SURGERY

## 2025-06-09 DEVICE — ATTUNE FEMORAL POROCOAT POSTERIOR STABILIZED SIZE 3 LEFT CEMENTLESS
Type: IMPLANTABLE DEVICE | Site: KNEE | Status: FUNCTIONAL
Brand: ATTUNE

## 2025-06-09 DEVICE — ATTUNE KNEE SYSTEM TIBIAL INSERT ROTATING PLATFORM POSTERIOR STABILIZED SIZE 3 8MM AOX
Type: IMPLANTABLE DEVICE | Site: KNEE | Status: FUNCTIONAL
Brand: ATTUNE

## 2025-06-09 RX ORDER — OXYCODONE HYDROCHLORIDE 5 MG/1
5 TABLET ORAL EVERY 4 HOURS PRN
Status: DISCONTINUED | OUTPATIENT
Start: 2025-06-09 | End: 2025-06-12 | Stop reason: HOSPADM

## 2025-06-09 RX ORDER — MORPHINE SULFATE 2 MG/ML
2 INJECTION, SOLUTION INTRAMUSCULAR; INTRAVENOUS EVERY 4 HOURS PRN
Status: DISCONTINUED | OUTPATIENT
Start: 2025-06-09 | End: 2025-06-12 | Stop reason: HOSPADM

## 2025-06-09 RX ORDER — ASPIRIN 81 MG/1
81 TABLET ORAL 2 TIMES DAILY
Status: DISCONTINUED | OUTPATIENT
Start: 2025-06-10 | End: 2025-06-12 | Stop reason: HOSPADM

## 2025-06-09 RX ORDER — LIDOCAINE HYDROCHLORIDE 20 MG/ML
INJECTION, SOLUTION EPIDURAL; INFILTRATION; INTRACAUDAL; PERINEURAL AS NEEDED
Status: DISCONTINUED | OUTPATIENT
Start: 2025-06-09 | End: 2025-06-09

## 2025-06-09 RX ORDER — ESCITALOPRAM OXALATE 10 MG/1
20 TABLET ORAL DAILY
Status: DISCONTINUED | OUTPATIENT
Start: 2025-06-09 | End: 2025-06-12 | Stop reason: HOSPADM

## 2025-06-09 RX ORDER — ONDANSETRON HYDROCHLORIDE 2 MG/ML
4 INJECTION, SOLUTION INTRAVENOUS EVERY 8 HOURS PRN
Status: DISCONTINUED | OUTPATIENT
Start: 2025-06-09 | End: 2025-06-12

## 2025-06-09 RX ORDER — SODIUM CHLORIDE, SODIUM LACTATE, POTASSIUM CHLORIDE, CALCIUM CHLORIDE 600; 310; 30; 20 MG/100ML; MG/100ML; MG/100ML; MG/100ML
20 INJECTION, SOLUTION INTRAVENOUS CONTINUOUS
Status: ACTIVE | OUTPATIENT
Start: 2025-06-09 | End: 2025-06-10

## 2025-06-09 RX ORDER — GLYCOPYRROLATE 0.2 MG/ML
INJECTION INTRAMUSCULAR; INTRAVENOUS AS NEEDED
Status: DISCONTINUED | OUTPATIENT
Start: 2025-06-09 | End: 2025-06-09

## 2025-06-09 RX ORDER — SODIUM CHLORIDE, SODIUM LACTATE, POTASSIUM CHLORIDE, CALCIUM CHLORIDE 600; 310; 30; 20 MG/100ML; MG/100ML; MG/100ML; MG/100ML
100 INJECTION, SOLUTION INTRAVENOUS CONTINUOUS
Status: ACTIVE | OUTPATIENT
Start: 2025-06-09 | End: 2025-06-10

## 2025-06-09 RX ORDER — ONDANSETRON 4 MG/1
4 TABLET, FILM COATED ORAL EVERY 8 HOURS PRN
Status: DISCONTINUED | OUTPATIENT
Start: 2025-06-09 | End: 2025-06-12

## 2025-06-09 RX ORDER — ACETAMINOPHEN 325 MG/1
975 TABLET ORAL ONCE
Status: COMPLETED | OUTPATIENT
Start: 2025-06-09 | End: 2025-06-09

## 2025-06-09 RX ORDER — PHENYLEPHRINE HCL IN 0.9% NACL 0.4MG/10ML
SYRINGE (ML) INTRAVENOUS AS NEEDED
Status: DISCONTINUED | OUTPATIENT
Start: 2025-06-09 | End: 2025-06-09

## 2025-06-09 RX ORDER — PROPOFOL 10 MG/ML
INJECTION, EMULSION INTRAVENOUS AS NEEDED
Status: DISCONTINUED | OUTPATIENT
Start: 2025-06-09 | End: 2025-06-09

## 2025-06-09 RX ORDER — DROPERIDOL 2.5 MG/ML
0.62 INJECTION, SOLUTION INTRAMUSCULAR; INTRAVENOUS ONCE AS NEEDED
Status: DISCONTINUED | OUTPATIENT
Start: 2025-06-09 | End: 2025-06-09 | Stop reason: HOSPADM

## 2025-06-09 RX ORDER — OXYCODONE HYDROCHLORIDE 5 MG/1
5 TABLET ORAL EVERY 4 HOURS PRN
Status: DISCONTINUED | OUTPATIENT
Start: 2025-06-09 | End: 2025-06-09 | Stop reason: HOSPADM

## 2025-06-09 RX ORDER — CEFAZOLIN SODIUM 2 G/50ML
2 SOLUTION INTRAVENOUS EVERY 6 HOURS
Status: COMPLETED | OUTPATIENT
Start: 2025-06-09 | End: 2025-06-10

## 2025-06-09 RX ORDER — ACETAMINOPHEN 325 MG/1
650 TABLET ORAL EVERY 6 HOURS SCHEDULED
Status: DISCONTINUED | OUTPATIENT
Start: 2025-06-09 | End: 2025-06-12 | Stop reason: HOSPADM

## 2025-06-09 RX ORDER — POLYETHYLENE GLYCOL 3350 17 G/17G
17 POWDER, FOR SOLUTION ORAL DAILY
Status: DISCONTINUED | OUTPATIENT
Start: 2025-06-09 | End: 2025-06-12 | Stop reason: HOSPADM

## 2025-06-09 RX ORDER — SODIUM CHLORIDE, SODIUM LACTATE, POTASSIUM CHLORIDE, CALCIUM CHLORIDE 600; 310; 30; 20 MG/100ML; MG/100ML; MG/100ML; MG/100ML
100 INJECTION, SOLUTION INTRAVENOUS CONTINUOUS
Status: DISCONTINUED | OUTPATIENT
Start: 2025-06-09 | End: 2025-06-09 | Stop reason: HOSPADM

## 2025-06-09 RX ORDER — MIDAZOLAM HYDROCHLORIDE 1 MG/ML
INJECTION, SOLUTION INTRAMUSCULAR; INTRAVENOUS AS NEEDED
Status: DISCONTINUED | OUTPATIENT
Start: 2025-06-09 | End: 2025-06-09

## 2025-06-09 RX ORDER — OXYCODONE HYDROCHLORIDE 10 MG/1
10 TABLET ORAL EVERY 4 HOURS PRN
Status: DISCONTINUED | OUTPATIENT
Start: 2025-06-09 | End: 2025-06-12

## 2025-06-09 RX ORDER — ALBUTEROL SULFATE 0.83 MG/ML
2.5 SOLUTION RESPIRATORY (INHALATION) ONCE AS NEEDED
Status: DISCONTINUED | OUTPATIENT
Start: 2025-06-09 | End: 2025-06-09 | Stop reason: HOSPADM

## 2025-06-09 RX ORDER — CEFAZOLIN SODIUM 2 G/100ML
2 INJECTION, SOLUTION INTRAVENOUS ONCE
Status: COMPLETED | OUTPATIENT
Start: 2025-06-09 | End: 2025-06-09

## 2025-06-09 RX ORDER — MEPERIDINE HYDROCHLORIDE 25 MG/ML
12.5 INJECTION INTRAMUSCULAR; INTRAVENOUS; SUBCUTANEOUS EVERY 10 MIN PRN
Status: DISCONTINUED | OUTPATIENT
Start: 2025-06-09 | End: 2025-06-09 | Stop reason: HOSPADM

## 2025-06-09 RX ORDER — NALOXONE HYDROCHLORIDE 0.4 MG/ML
0.2 INJECTION, SOLUTION INTRAMUSCULAR; INTRAVENOUS; SUBCUTANEOUS EVERY 5 MIN PRN
Status: DISCONTINUED | OUTPATIENT
Start: 2025-06-09 | End: 2025-06-12 | Stop reason: HOSPADM

## 2025-06-09 RX ORDER — DEXMEDETOMIDINE IN 0.9 % NACL 20 MCG/5ML
SYRINGE (ML) INTRAVENOUS AS NEEDED
Status: DISCONTINUED | OUTPATIENT
Start: 2025-06-09 | End: 2025-06-09

## 2025-06-09 RX ORDER — CELECOXIB 400 MG/1
400 CAPSULE ORAL ONCE
Status: COMPLETED | OUTPATIENT
Start: 2025-06-09 | End: 2025-06-09

## 2025-06-09 RX ORDER — FENTANYL CITRATE 50 UG/ML
INJECTION, SOLUTION INTRAMUSCULAR; INTRAVENOUS AS NEEDED
Status: DISCONTINUED | OUTPATIENT
Start: 2025-06-09 | End: 2025-06-09

## 2025-06-09 RX ORDER — TRANEXAMIC ACID 10 MG/ML
INJECTION, SOLUTION INTRAVENOUS AS NEEDED
Status: DISCONTINUED | OUTPATIENT
Start: 2025-06-09 | End: 2025-06-09

## 2025-06-09 RX ORDER — ONDANSETRON HYDROCHLORIDE 2 MG/ML
INJECTION, SOLUTION INTRAVENOUS AS NEEDED
Status: DISCONTINUED | OUTPATIENT
Start: 2025-06-09 | End: 2025-06-09

## 2025-06-09 RX ORDER — ONDANSETRON HYDROCHLORIDE 2 MG/ML
4 INJECTION, SOLUTION INTRAVENOUS ONCE AS NEEDED
Status: COMPLETED | OUTPATIENT
Start: 2025-06-09 | End: 2025-06-09

## 2025-06-09 RX ORDER — DIPHENHYDRAMINE HYDROCHLORIDE 50 MG/ML
12.5 INJECTION, SOLUTION INTRAMUSCULAR; INTRAVENOUS ONCE AS NEEDED
Status: DISCONTINUED | OUTPATIENT
Start: 2025-06-09 | End: 2025-06-09 | Stop reason: HOSPADM

## 2025-06-09 RX ORDER — DOCUSATE SODIUM 100 MG/1
100 CAPSULE, LIQUID FILLED ORAL 2 TIMES DAILY
Status: DISCONTINUED | OUTPATIENT
Start: 2025-06-09 | End: 2025-06-11

## 2025-06-09 RX ORDER — FAMOTIDINE 20 MG/1
40 TABLET, FILM COATED ORAL 2 TIMES DAILY PRN
Status: DISCONTINUED | OUTPATIENT
Start: 2025-06-09 | End: 2025-06-11

## 2025-06-09 RX ORDER — BUPIVACAINE HYDROCHLORIDE 7.5 MG/ML
INJECTION, SOLUTION EPIDURAL; RETROBULBAR AS NEEDED
Status: DISCONTINUED | OUTPATIENT
Start: 2025-06-09 | End: 2025-06-09

## 2025-06-09 RX ORDER — PANTOPRAZOLE SODIUM 40 MG/1
40 TABLET, DELAYED RELEASE ORAL
Status: DISCONTINUED | OUTPATIENT
Start: 2025-06-10 | End: 2025-06-11

## 2025-06-09 RX ADMIN — ACETAMINOPHEN 650 MG: 325 TABLET, FILM COATED ORAL at 20:11

## 2025-06-09 RX ADMIN — TRANEXAMIC ACID 1000 MG: 10 INJECTION, SOLUTION INTRAVENOUS at 12:46

## 2025-06-09 RX ADMIN — Medication 80 MCG: at 12:58

## 2025-06-09 RX ADMIN — Medication 8 MCG: at 12:36

## 2025-06-09 RX ADMIN — PROPOFOL 30 MG: 10 INJECTION, EMULSION INTRAVENOUS at 12:39

## 2025-06-09 RX ADMIN — PROPOFOL 20 MG: 10 INJECTION, EMULSION INTRAVENOUS at 12:42

## 2025-06-09 RX ADMIN — OXYCODONE HYDROCHLORIDE 5 MG: 5 TABLET ORAL at 22:36

## 2025-06-09 RX ADMIN — MIDAZOLAM 2 MG: 1 INJECTION INTRAMUSCULAR; INTRAVENOUS at 12:16

## 2025-06-09 RX ADMIN — TRANEXAMIC ACID 1000 MG: 10 INJECTION, SOLUTION INTRAVENOUS at 13:42

## 2025-06-09 RX ADMIN — CEFAZOLIN SODIUM 2 G: 2 SOLUTION INTRAVENOUS at 20:11

## 2025-06-09 RX ADMIN — ONDANSETRON 4 MG: 2 INJECTION, SOLUTION INTRAMUSCULAR; INTRAVENOUS at 12:40

## 2025-06-09 RX ADMIN — SODIUM CHLORIDE, POTASSIUM CHLORIDE, SODIUM LACTATE AND CALCIUM CHLORIDE 20 ML/HR: 600; 310; 30; 20 INJECTION, SOLUTION INTRAVENOUS at 11:09

## 2025-06-09 RX ADMIN — Medication 4 L/MIN: at 14:37

## 2025-06-09 RX ADMIN — CEFAZOLIN SODIUM 2 G: 2 INJECTION, SOLUTION INTRAVENOUS at 12:41

## 2025-06-09 RX ADMIN — BUPIVACAINE HYDROCHLORIDE 1 ML: 7.5 INJECTION, SOLUTION EPIDURAL; RETROBULBAR at 12:35

## 2025-06-09 RX ADMIN — PROPOFOL 75 MCG/KG/MIN: 10 INJECTION, EMULSION INTRAVENOUS at 12:45

## 2025-06-09 RX ADMIN — FENTANYL CITRATE 25 MCG: 50 INJECTION, SOLUTION INTRAMUSCULAR; INTRAVENOUS at 12:37

## 2025-06-09 RX ADMIN — Medication 2 L/MIN: at 15:07

## 2025-06-09 RX ADMIN — ONDANSETRON 4 MG: 2 INJECTION, SOLUTION INTRAMUSCULAR; INTRAVENOUS at 17:34

## 2025-06-09 RX ADMIN — ACETAMINOPHEN 975 MG: 325 TABLET, FILM COATED ORAL at 10:37

## 2025-06-09 RX ADMIN — Medication 40 MCG: at 13:43

## 2025-06-09 RX ADMIN — CELECOXIB 400 MG: 400 CAPSULE ORAL at 10:37

## 2025-06-09 RX ADMIN — HYDROMORPHONE HYDROCHLORIDE 0.5 MG: 1 INJECTION, SOLUTION INTRAMUSCULAR; INTRAVENOUS; SUBCUTANEOUS at 17:15

## 2025-06-09 RX ADMIN — LIDOCAINE HYDROCHLORIDE 40 MG: 20 INJECTION, SOLUTION EPIDURAL; INFILTRATION; INTRACAUDAL at 12:41

## 2025-06-09 RX ADMIN — Medication 5 L/MIN: at 14:22

## 2025-06-09 RX ADMIN — GLYCOPYRROLATE 0.2 MG: 0.2 INJECTION INTRAMUSCULAR; INTRAVENOUS at 13:00

## 2025-06-09 RX ADMIN — POVIDONE-IODINE 1 APPLICATION: 5 SOLUTION TOPICAL at 10:38

## 2025-06-09 SDOH — ECONOMIC STABILITY: HOUSING INSECURITY: AT ANY TIME IN THE PAST 12 MONTHS, WERE YOU HOMELESS OR LIVING IN A SHELTER (INCLUDING NOW)?: NO

## 2025-06-09 SDOH — SOCIAL STABILITY: SOCIAL INSECURITY
WITHIN THE LAST YEAR, HAVE YOU BEEN KICKED, HIT, SLAPPED, OR OTHERWISE PHYSICALLY HURT BY YOUR PARTNER OR EX-PARTNER?: NO

## 2025-06-09 SDOH — SOCIAL STABILITY: SOCIAL INSECURITY
WITHIN THE LAST YEAR, HAVE YOU BEEN RAPED OR FORCED TO HAVE ANY KIND OF SEXUAL ACTIVITY BY YOUR PARTNER OR EX-PARTNER?: NO

## 2025-06-09 SDOH — SOCIAL STABILITY: SOCIAL INSECURITY: WITHIN THE LAST YEAR, HAVE YOU BEEN HUMILIATED OR EMOTIONALLY ABUSED IN OTHER WAYS BY YOUR PARTNER OR EX-PARTNER?: NO

## 2025-06-09 SDOH — ECONOMIC STABILITY: HOUSING INSECURITY: IN THE LAST 12 MONTHS, WAS THERE A TIME WHEN YOU WERE NOT ABLE TO PAY THE MORTGAGE OR RENT ON TIME?: NO

## 2025-06-09 SDOH — SOCIAL STABILITY: SOCIAL INSECURITY: DOES ANYONE TRY TO KEEP YOU FROM HAVING/CONTACTING OTHER FRIENDS OR DOING THINGS OUTSIDE YOUR HOME?: NO

## 2025-06-09 SDOH — ECONOMIC STABILITY: HOUSING INSECURITY: IN THE PAST 12 MONTHS, HOW MANY TIMES HAVE YOU MOVED WHERE YOU WERE LIVING?: 0

## 2025-06-09 SDOH — SOCIAL STABILITY: SOCIAL INSECURITY: DO YOU FEEL ANYONE HAS EXPLOITED OR TAKEN ADVANTAGE OF YOU FINANCIALLY OR OF YOUR PERSONAL PROPERTY?: NO

## 2025-06-09 SDOH — SOCIAL STABILITY: SOCIAL INSECURITY: HAS ANYONE EVER THREATENED TO HURT YOUR FAMILY OR YOUR PETS?: NO

## 2025-06-09 SDOH — ECONOMIC STABILITY: FOOD INSECURITY: WITHIN THE PAST 12 MONTHS, YOU WORRIED THAT YOUR FOOD WOULD RUN OUT BEFORE YOU GOT THE MONEY TO BUY MORE.: NEVER TRUE

## 2025-06-09 SDOH — ECONOMIC STABILITY: INCOME INSECURITY: IN THE PAST 12 MONTHS HAS THE ELECTRIC, GAS, OIL, OR WATER COMPANY THREATENED TO SHUT OFF SERVICES IN YOUR HOME?: NO

## 2025-06-09 SDOH — HEALTH STABILITY: MENTAL HEALTH: HOW MANY DRINKS CONTAINING ALCOHOL DO YOU HAVE ON A TYPICAL DAY WHEN YOU ARE DRINKING?: PATIENT DOES NOT DRINK

## 2025-06-09 SDOH — HEALTH STABILITY: MENTAL HEALTH: HOW OFTEN DO YOU HAVE A DRINK CONTAINING ALCOHOL?: NEVER

## 2025-06-09 SDOH — ECONOMIC STABILITY: TRANSPORTATION INSECURITY: IN THE PAST 12 MONTHS, HAS LACK OF TRANSPORTATION KEPT YOU FROM MEDICAL APPOINTMENTS OR FROM GETTING MEDICATIONS?: NO

## 2025-06-09 SDOH — HEALTH STABILITY: MENTAL HEALTH: HOW OFTEN DO YOU HAVE SIX OR MORE DRINKS ON ONE OCCASION?: NEVER

## 2025-06-09 SDOH — ECONOMIC STABILITY: FOOD INSECURITY: WITHIN THE PAST 12 MONTHS, THE FOOD YOU BOUGHT JUST DIDN'T LAST AND YOU DIDN'T HAVE MONEY TO GET MORE.: NEVER TRUE

## 2025-06-09 SDOH — SOCIAL STABILITY: SOCIAL INSECURITY: WITHIN THE LAST YEAR, HAVE YOU BEEN AFRAID OF YOUR PARTNER OR EX-PARTNER?: NO

## 2025-06-09 SDOH — SOCIAL STABILITY: SOCIAL INSECURITY: ARE YOU OR HAVE YOU BEEN THREATENED OR ABUSED PHYSICALLY, EMOTIONALLY, OR SEXUALLY BY ANYONE?: NO

## 2025-06-09 SDOH — ECONOMIC STABILITY: FOOD INSECURITY: HOW HARD IS IT FOR YOU TO PAY FOR THE VERY BASICS LIKE FOOD, HOUSING, MEDICAL CARE, AND HEATING?: NOT HARD AT ALL

## 2025-06-09 SDOH — SOCIAL STABILITY: SOCIAL INSECURITY: ARE THERE ANY APPARENT SIGNS OF INJURIES/BEHAVIORS THAT COULD BE RELATED TO ABUSE/NEGLECT?: NO

## 2025-06-09 SDOH — SOCIAL STABILITY: SOCIAL INSECURITY: HAVE YOU HAD THOUGHTS OF HARMING ANYONE ELSE?: NO

## 2025-06-09 SDOH — SOCIAL STABILITY: SOCIAL INSECURITY: ABUSE: ADULT

## 2025-06-09 SDOH — SOCIAL STABILITY: SOCIAL INSECURITY: WERE YOU ABLE TO COMPLETE ALL THE BEHAVIORAL HEALTH SCREENINGS?: YES

## 2025-06-09 SDOH — SOCIAL STABILITY: SOCIAL INSECURITY: HAVE YOU HAD ANY THOUGHTS OF HARMING ANYONE ELSE?: NO

## 2025-06-09 SDOH — SOCIAL STABILITY: SOCIAL INSECURITY: DO YOU FEEL UNSAFE GOING BACK TO THE PLACE WHERE YOU ARE LIVING?: NO

## 2025-06-09 ASSESSMENT — COGNITIVE AND FUNCTIONAL STATUS - GENERAL
CLIMB 3 TO 5 STEPS WITH RAILING: A LITTLE
DRESSING REGULAR LOWER BODY CLOTHING: A LITTLE
MOBILITY SCORE: 20
STANDING UP FROM CHAIR USING ARMS: A LITTLE
WALKING IN HOSPITAL ROOM: A LITTLE
CLIMB 3 TO 5 STEPS WITH RAILING: A LITTLE
PATIENT BASELINE BEDBOUND: NO
MOVING TO AND FROM BED TO CHAIR: A LITTLE
DAILY ACTIVITIY SCORE: 23
DRESSING REGULAR LOWER BODY CLOTHING: A LITTLE
WALKING IN HOSPITAL ROOM: A LITTLE
MOVING TO AND FROM BED TO CHAIR: A LITTLE
DAILY ACTIVITIY SCORE: 23
MOBILITY SCORE: 20
STANDING UP FROM CHAIR USING ARMS: A LITTLE

## 2025-06-09 ASSESSMENT — LIFESTYLE VARIABLES
AUDIT-C TOTAL SCORE: 0
AUDIT-C TOTAL SCORE: 0
SKIP TO QUESTIONS 9-10: 1
HOW MANY STANDARD DRINKS CONTAINING ALCOHOL DO YOU HAVE ON A TYPICAL DAY: PATIENT DOES NOT DRINK
HOW OFTEN DO YOU HAVE A DRINK CONTAINING ALCOHOL: NEVER
SKIP TO QUESTIONS 9-10: 1
AUDIT-C TOTAL SCORE: 0
HOW OFTEN DO YOU HAVE 6 OR MORE DRINKS ON ONE OCCASION: NEVER

## 2025-06-09 ASSESSMENT — PAIN SCALES - GENERAL
PAINLEVEL_OUTOF10: 0 - NO PAIN
PAINLEVEL_OUTOF10: 6
PAINLEVEL_OUTOF10: 4
PAINLEVEL_OUTOF10: 10 - WORST POSSIBLE PAIN
PAINLEVEL_OUTOF10: 4
PAINLEVEL_OUTOF10: 0 - NO PAIN
PAINLEVEL_OUTOF10: 6
PAINLEVEL_OUTOF10: 0 - NO PAIN
PAINLEVEL_OUTOF10: 0 - NO PAIN

## 2025-06-09 ASSESSMENT — COLUMBIA-SUICIDE SEVERITY RATING SCALE - C-SSRS
1. IN THE PAST MONTH, HAVE YOU WISHED YOU WERE DEAD OR WISHED YOU COULD GO TO SLEEP AND NOT WAKE UP?: NO
6. HAVE YOU EVER DONE ANYTHING, STARTED TO DO ANYTHING, OR PREPARED TO DO ANYTHING TO END YOUR LIFE?: NO
2. HAVE YOU ACTUALLY HAD ANY THOUGHTS OF KILLING YOURSELF?: NO

## 2025-06-09 ASSESSMENT — PATIENT HEALTH QUESTIONNAIRE - PHQ9
SUM OF ALL RESPONSES TO PHQ9 QUESTIONS 1 & 2: 0
1. LITTLE INTEREST OR PLEASURE IN DOING THINGS: NOT AT ALL
2. FEELING DOWN, DEPRESSED OR HOPELESS: NOT AT ALL

## 2025-06-09 ASSESSMENT — ACTIVITIES OF DAILY LIVING (ADL)
TOILETING: INDEPENDENT
WALKS IN HOME: INDEPENDENT
FEEDING YOURSELF: INDEPENDENT
HEARING - RIGHT EAR: FUNCTIONAL
BATHING: INDEPENDENT
PATIENT'S MEMORY ADEQUATE TO SAFELY COMPLETE DAILY ACTIVITIES?: YES
HEARING - LEFT EAR: FUNCTIONAL
DRESSING YOURSELF: INDEPENDENT
ADEQUATE_TO_COMPLETE_ADL: YES
JUDGMENT_ADEQUATE_SAFELY_COMPLETE_DAILY_ACTIVITIES: YES
GROOMING: INDEPENDENT
LACK_OF_TRANSPORTATION: NO

## 2025-06-09 ASSESSMENT — PAIN - FUNCTIONAL ASSESSMENT
PAIN_FUNCTIONAL_ASSESSMENT: 0-10

## 2025-06-09 NOTE — HH CARE COORDINATION
Home Care received a Referral for Physical Therapy. We have processed the referral for a Start of Care on 6/10/2025.     If you have any questions or concerns, please feel free to contact us at [phone]. Follow the prompts, enter your five digit zip code, and you will be directed to your care team on [facility].

## 2025-06-09 NOTE — ANESTHESIA PROCEDURE NOTES
Peripheral Block    Patient location during procedure: pre-op  Reason for block: at surgeon's request and post-op pain management  Staffing  Performed: attending   Authorized by: Selvin Hernandez DO    Performed by: Selvin Hernandez DO  Preanesthetic Checklist  Completed: patient identified, IV checked, site marked, risks and benefits discussed, surgical consent, monitors and equipment checked, pre-op evaluation and timeout performed   Timeout performed at:   Peripheral Block  Patient position: laying flat  Prep: ChloraPrep  Patient monitoring: continuous pulse ox  Block type: adductor canal  Laterality: left  Injection technique: single-shot  Guidance: nerve stimulator and ultrasound guided  Local infiltration: ropivacaine  Infiltration strength: 0.5 %  Dose: 25 mL  Needle  Needle type: pencil-tip   Needle gauge: 22 G  Needle length: 8 cm  Needle localization: nerve stimulator and ultrasound guidance  Assessment  Injection assessment: no paresthesia on injection, negative aspiration for heme, incremental injection and local visualized surrounding nerve on ultrasound  Paresthesia pain: none  Heart rate change: no  Slow fractionated injection: yes

## 2025-06-09 NOTE — ANESTHESIA PREPROCEDURE EVALUATION
Patient: Odessa Thompson    Procedure Information       Date/Time: 06/09/25 1145    Procedure: KNEE REPLACEMENT TOTAL HYBRID UNILAT (Left: Knee) - depuy attune    Location: [facility] OR 03 / [facility] OR    Surgeons: Lowell Nicolas MD            Relevant Problems   Cardiac   (+) Essential hypertension   (+) Mixed hyperlipidemia      Neuro   (+) Generalized anxiety disorder   (+) Major depressive disorder      Endocrine   (+) Type 2 diabetes mellitus with hyperglycemia, without long-term current use of insulin      HEENT   (+) Seasonal allergies       Clinical information reviewed:   Tobacco  Allergies  Meds   Med Hx  Surg Hx  OB Status  Fam Hx  Soc   Hx        NPO Detail:  NPO/Void Status  Carbohydrate Drink Given Prior to Surgery? : N  Date of Last Liquid: 06/09/25  Time of Last Liquid: 0715  Date of Last Solid: 06/08/25  Time of Last Solid: 2355  Last Intake Type: Clear fluids  Time of Last Void: 1033         Physical Exam    Airway  Mallampati: II  TM distance: >3 FB  Neck ROM: full  Mouth opening: 3 or more finger widths     Cardiovascular - normal exam   Dental    Pulmonary - normal exam   Abdominal            Anesthesia Plan    History of general anesthesia?: yes  History of complications of general anesthesia?: no    ASA 2     MAC, spinal and regional     intravenous induction   Anesthetic plan and risks discussed with patient.  Use of blood products discussed with patient who.    Plan discussed with CRNA and attending.

## 2025-06-09 NOTE — OP NOTE
KNEE REPLACEMENT TOTAL HYBRID UNILAT (L) Operative Note     Date: 2025  OR Location: [facility] OR    Name: Odessa Thompson, : 1970, Age: 55 y.o., MRN: [id number], Sex: female    Diagnosis  Pre-op Diagnosis      * Chronic pain of left knee [M25.562, G89.29] Post-op Diagnosis     * Chronic pain of left knee [M25.562, G89.29]     Procedures  KNEE REPLACEMENT TOTAL HYBRID UNILAT  24918 - [place] ARTHRP KNE CONDYLE&PLATU MEDIAL&LAT COMPARTMENTS      Surgeons      * Lowell Nicolas - Primary    Resident/Fellow/Other Assistant:  Surgeons and Role:     * Joaquim Goodwin PA-C - ADRIANO First Assist    Staff:   Circulator: Mariama Radford Person: Catie Nguyen Circulator: Jovanna    Anesthesia Staff: Anesthesiologist: Selvin Hernandez DO  CRNA: SAMUEL Caldera-CRNA    Procedure Summary  Anesthesia: Regional, Monitor Anesthesia Care, Spinal  ASA: II  Estimated Blood Loss: 50mL  Intra-op Medications:   Administrations occurring from 1145 to 1435 on 25:   Medication Name Total Dose   EPINEPHrine (Adrenalin) 0.2 mL, ketorolac (Toradol) 1 mg, morphine PF (Duramorph) 1 mg/mL 5 mg, ropivacaine (Naropin) 5 mg/mL (0.5 %) 30 mL in sodium chloride 0.9% 20 mL syringe 56.2 mL   lactated Ringer's infusion Cannot be calculated   oxygen (O2) therapy 65 L   ceFAZolin (Ancef) 2 g in dextrose (iso)  mL 2 g   bupivacaine PF (Marcaine) 0.75 % 1 mL   dexMEDETOMidine 4 mcg/mL in NS syringe 8 mcg   fentaNYL (Sublimaze) injection 50 mcg/mL 25 mcg   glycopyrrolate (Robinul) 0.2 mg/mL injection VIAL 0.2 mg   lidocaine PF (Xylocaine-MPF) local injection 2 % 40 mg   midazolam (Versed) injection 1 mg/mL 2 mg   ondansetron (Zofran) 2 mg/mL injection 4 mg   phenylephrine 40 mcg/mL syringe 10 mL 120 mcg   propofol (Diprivan) injection 10 mg/mL 442.83 mg   tranexamic acid IV 1,000 mg in 100 mL NaCl (iso) - premix 2,000 mg              Anesthesia Record               Intraprocedure I/O Totals          Intake    Tranexamic Acid 0.00 mL    The total  shown is the total volume documented since Anesthesia Start was filed.    lactated Ringer's infusion 1000.00 mL    Total Intake 1000 mL       Output    Urine 300 mL    Est. Blood Loss 50 mL    Total Output 350 mL       Net    Net Volume 650 mL          Specimen:   ID Type Source Tests Collected by Time   1 : LEFT KNEE CONTENTS Tissue KNEE ARTHROPLASTY LEFT SURGICAL PATHOLOGY EXAM Lowell Nicolas MD 6/9/2025 1252                 Drains and/or Catheters: * None in log *    Tourniquet Times:     Total Tourniquet Time Documented:  Thigh (Left) - 80 minutes  Total: Thigh (Left) - 80 minutes      Implants:  Implants       Type Name Action Serial No.      Joint Knee INSERT, ATTUNE PS RP, SZ 3, 8MM - [id number] Implanted       Attune Femoral Porocoat Implanted       Attune Tibial Base Implanted               Findings: severe djd    Indications: Odessa Thompson is an 55 y.o. female who is having surgery for Chronic pain of left knee [M25.562, G89.29].     The patient was seen in the preoperative area. The risks, benefits, complications, treatment options, non-operative alternatives, expected recovery and outcomes were discussed with the patient. The possibilities of reaction to medication, pulmonary aspiration, injury to surrounding structures, bleeding, recurrent infection, the need for additional procedures, failure to diagnose a condition, and creating a complication requiring transfusion or operation were discussed with the patient. The patient concurred with the proposed plan, giving informed consent.  The site of surgery was properly noted/marked if necessary per policy. The patient has been actively warmed in preoperative area. Preoperative antibiotics have been ordered and given within 1 hours of incision. Venous thrombosis prophylaxis have been ordered including bilateral sequential compression devices and chemical prophylaxis    Procedure Details: Statement of medical necessity:  This is a 55 y.o.  female  with severe degenerative disease of the knee that has failed non operative management. the risks, benefits and alternatives of total knee arthroplasty were discussed with the patient and they signed informed consent.     Surgical assistants, as listed above, including residents if listed, were involved in the procedure. They are trained and qualified to assist in the procedure. Prior to the procedure they assisted with patient positioning, setup, and surgical skin preparation. During the procedure, they actively assisted Dr. Nicolas in completing the operation safely and expeditiously by helping to provide exposure, retract tissues, maintain hemostasis, closure, and any other necessary technical tasks.     Description of procedure:   The patient was brought to the operating room and placed on the operating table in the supine position. All bony prominences were well padded. Appropriate preoperative antibiotics were administered. Anesthesia was induced by the anesthesia team and a time out was performed. The patient was identified by name and medical record number and the laterality and the site of surgery were confirmed by all present.     Under pneumatic tourniquet control, a longitudinal anterior skin incision was made with medial parapatellar arthrotomy. Hemostasis was obtained with Bovie electrocautery. Comprehensive exposure the knee was carried out for total knee arthroplasty. The patella was subluxed laterally and the knee placed in a flexed position.      Using intramedullary alignment, the distal femur was cut in a 5° valgus position. The appropriate-sized femoral component was selected based upon intraoperative measurements of the sagittal dimension of the femur. The distal femoral cutting guide was placed perpendicular to Whitesides line and parallel with the transepicondylar axis, with  3° of external rotation based upon the posterior condylar axis. The distal femur was then finished to accept a posterior  stabilized prosthesis.     Attention was then directed to the proximal tibia. Meniscal remnants were excised. Using extramedullary alignment, the proximal tibia was cut perpendicular to its longitudinal axis with a 3° posterior slope. Osteophytes were excised from the posterior femur, medial and lateral femur, and circumferentially about the tibia to avoid soft tissue impingement. The posterior capsule, medial lateral soft tissue structures were injected using combination of Duramorph, Ropivicaine, toradol and epinephrine.Flexion and extension gaps were assessed.     Flexion and extension gaps were equal and rectangular. No ligamentous release was necessary for appropriate balance.     Trial components were placed. Knee achieved full extension and flexion with excellent varus and valgus stability throughout range of motion. The patella tracked centrally. Tibial component rotation was marked, and the tibia finished in the usual fashion to accept an appropriately sized tibial component.     bone surfaces prepared with pulsatile saline irrigation. Bone quality was assessed and deemed appropriate for press fit implants. Final implants were inserted and impacted into position with excellent stability noted. The final articular polyethyelne surface was then inserted.      The patella was assessed and its surfaces judged appropriate for non prosthetic patellar arthroplasty. Non prosthetic patellar arthroplasty was then performed, with excision of synovium from the periphery of the patella and circumferential electrocautery and excision of ostephytes.      With the final implants in placed the mechanics of the knee were once again confirmed and noted to be excellent.      Joint was irrigated with dilute betadine solution, followed by normal saline.   The arthrotomy was reapproximated using #1 poly and #2 quill sutures. Subcutaneous layer was closed with 2-0 poly, subcuticular layer with 3-0 v-lock, then perineo mesh and  glue dressing was placed followed by mepilex, cast padding, and ace wrap.     Patient was awoken from anesthesia and brought to the pacu in stable position.     Statement of staff presence: I was present and scrubbed for all critical portions of the procedure and was immediately available during closing      Complications:  None; patient tolerated the procedure well.    Disposition: PACU - hemodynamically stable.  Condition: stable         Additional Details: none    Attending Attestation: I was present and scrubbed for the key portions of the procedure.    Lowell Nicolas  Phone Number: [phone]

## 2025-06-10 LAB
ANION GAP SERPL CALC-SCNC: 12 MMOL/L (ref 10–20)
BUN SERPL-MCNC: 16 MG/DL (ref 6–23)
CALCIUM SERPL-MCNC: 8.2 MG/DL (ref 8.6–10.3)
CHLORIDE SERPL-SCNC: 104 MMOL/L (ref 98–107)
CO2 SERPL-SCNC: 25 MMOL/L (ref 21–32)
CREAT SERPL-MCNC: 0.79 MG/DL (ref 0.5–1.05)
EGFRCR SERPLBLD CKD-EPI 2021: 90 ML/MIN/1.73M*2
ERYTHROCYTE [DISTWIDTH] IN BLOOD BY AUTOMATED COUNT: 12.9 % (ref 11.5–14.5)
GLUCOSE SERPL-MCNC: 94 MG/DL (ref 74–99)
HCT VFR BLD AUTO: 35.7 % (ref 36–46)
HGB BLD-MCNC: 11.3 G/DL (ref 12–16)
MCH RBC QN AUTO: 29.7 PG (ref 26–34)
MCHC RBC AUTO-ENTMCNC: 31.7 G/DL (ref 32–36)
MCV RBC AUTO: 94 FL (ref 80–100)
NRBC BLD-RTO: 0 /100 WBCS (ref 0–0)
PLATELET # BLD AUTO: 234 X10*3/UL (ref 150–450)
POTASSIUM SERPL-SCNC: 3.7 MMOL/L (ref 3.5–5.3)
RBC # BLD AUTO: 3.8 X10*6/UL (ref 4–5.2)
SODIUM SERPL-SCNC: 137 MMOL/L (ref 136–145)
WBC # BLD AUTO: 8.4 X10*3/UL (ref 4.4–11.3)

## 2025-06-10 PROCEDURE — 99231 SBSQ HOSP IP/OBS SF/LOW 25: CPT | Performed by: NURSE PRACTITIONER

## 2025-06-10 PROCEDURE — 2500000004 HC RX 250 GENERAL PHARMACY W/ HCPCS (ALT 636 FOR OP/ED): Mod: JZ | Performed by: NURSE PRACTITIONER

## 2025-06-10 PROCEDURE — 85027 COMPLETE CBC AUTOMATED: CPT | Performed by: NURSE PRACTITIONER

## 2025-06-10 PROCEDURE — 99232 SBSQ HOSP IP/OBS MODERATE 35: CPT | Performed by: NURSE PRACTITIONER

## 2025-06-10 PROCEDURE — 97161 PT EVAL LOW COMPLEX 20 MIN: CPT | Mod: GP

## 2025-06-10 PROCEDURE — 97116 GAIT TRAINING THERAPY: CPT | Mod: GP

## 2025-06-10 PROCEDURE — 2500000001 HC RX 250 WO HCPCS SELF ADMINISTERED DRUGS (ALT 637 FOR MEDICARE OP): Performed by: NURSE PRACTITIONER

## 2025-06-10 PROCEDURE — 97110 THERAPEUTIC EXERCISES: CPT | Mod: GP

## 2025-06-10 PROCEDURE — 36415 COLL VENOUS BLD VENIPUNCTURE: CPT | Performed by: NURSE PRACTITIONER

## 2025-06-10 PROCEDURE — 80048 BASIC METABOLIC PNL TOTAL CA: CPT | Performed by: NURSE PRACTITIONER

## 2025-06-10 PROCEDURE — 7100000011 HC EXTENDED STAY RECOVERY HOURLY - NURSING UNIT

## 2025-06-10 RX ORDER — HYDRALAZINE HYDROCHLORIDE 20 MG/ML
10 INJECTION INTRAMUSCULAR; INTRAVENOUS EVERY 6 HOURS PRN
Status: DISCONTINUED | OUTPATIENT
Start: 2025-06-10 | End: 2025-06-10

## 2025-06-10 RX ORDER — HYDRALAZINE HYDROCHLORIDE 20 MG/ML
10 INJECTION INTRAMUSCULAR; INTRAVENOUS EVERY 6 HOURS PRN
Status: DISCONTINUED | OUTPATIENT
Start: 2025-06-10 | End: 2025-06-12 | Stop reason: HOSPADM

## 2025-06-10 RX ADMIN — MORPHINE SULFATE 2 MG: 2 INJECTION, SOLUTION INTRAMUSCULAR; INTRAVENOUS at 09:09

## 2025-06-10 RX ADMIN — ACETAMINOPHEN 650 MG: 325 TABLET, FILM COATED ORAL at 02:07

## 2025-06-10 RX ADMIN — OXYCODONE HYDROCHLORIDE 10 MG: 10 TABLET ORAL at 03:12

## 2025-06-10 RX ADMIN — OXYCODONE HYDROCHLORIDE 10 MG: 10 TABLET ORAL at 08:31

## 2025-06-10 RX ADMIN — ASPIRIN 81 MG: 81 TABLET, DELAYED RELEASE ORAL at 08:31

## 2025-06-10 RX ADMIN — OXYCODONE HYDROCHLORIDE 10 MG: 10 TABLET ORAL at 21:01

## 2025-06-10 RX ADMIN — SODIUM CHLORIDE, POTASSIUM CHLORIDE, SODIUM LACTATE AND CALCIUM CHLORIDE 100 ML/HR: 600; 310; 30; 20 INJECTION, SOLUTION INTRAVENOUS at 03:14

## 2025-06-10 RX ADMIN — OXYCODONE HYDROCHLORIDE 10 MG: 10 TABLET ORAL at 12:12

## 2025-06-10 RX ADMIN — CEFAZOLIN SODIUM 2 G: 2 SOLUTION INTRAVENOUS at 02:07

## 2025-06-10 RX ADMIN — ACETAMINOPHEN 650 MG: 325 TABLET, FILM COATED ORAL at 12:12

## 2025-06-10 RX ADMIN — ACETAMINOPHEN 650 MG: 325 TABLET, FILM COATED ORAL at 17:00

## 2025-06-10 RX ADMIN — OXYCODONE HYDROCHLORIDE 10 MG: 10 TABLET ORAL at 17:00

## 2025-06-10 RX ADMIN — ASPIRIN 81 MG: 81 TABLET, DELAYED RELEASE ORAL at 21:01

## 2025-06-10 RX ADMIN — ONDANSETRON 4 MG: 2 INJECTION, SOLUTION INTRAMUSCULAR; INTRAVENOUS at 08:31

## 2025-06-10 RX ADMIN — ACETAMINOPHEN 650 MG: 325 TABLET, FILM COATED ORAL at 08:31

## 2025-06-10 ASSESSMENT — PAIN SCALES - GENERAL
PAINLEVEL_OUTOF10: 7
PAINLEVEL_OUTOF10: 8
PAINLEVEL_OUTOF10: 9
PAINLEVEL_OUTOF10: 7
PAINLEVEL_OUTOF10: 6
PAINLEVEL_OUTOF10: 1
PAINLEVEL_OUTOF10: 8
PAINLEVEL_OUTOF10: 8
PAINLEVEL_OUTOF10: 6
PAINLEVEL_OUTOF10: 8
PAINLEVEL_OUTOF10: 0 - NO PAIN
PAINLEVEL_OUTOF10: 5 - MODERATE PAIN
PAINLEVEL_OUTOF10: 4

## 2025-06-10 ASSESSMENT — COGNITIVE AND FUNCTIONAL STATUS - GENERAL
WALKING IN HOSPITAL ROOM: A LITTLE
TURNING FROM BACK TO SIDE WHILE IN FLAT BAD: A LITTLE
WALKING IN HOSPITAL ROOM: A LITTLE
CLIMB 3 TO 5 STEPS WITH RAILING: A LITTLE
MOBILITY SCORE: 20
MOVING TO AND FROM BED TO CHAIR: A LITTLE
WALKING IN HOSPITAL ROOM: A LITTLE
DRESSING REGULAR LOWER BODY CLOTHING: A LITTLE
CLIMB 3 TO 5 STEPS WITH RAILING: A LITTLE
MOVING TO AND FROM BED TO CHAIR: A LITTLE
MOVING TO AND FROM BED TO CHAIR: A LITTLE
STANDING UP FROM CHAIR USING ARMS: A LITTLE
STANDING UP FROM CHAIR USING ARMS: A LITTLE
MOVING FROM LYING ON BACK TO SITTING ON SIDE OF FLAT BED WITH BEDRAILS: A LITTLE
DAILY ACTIVITIY SCORE: 23
MOBILITY SCORE: 20
CLIMB 3 TO 5 STEPS WITH RAILING: A LOT
MOBILITY SCORE: 17
CLIMB 3 TO 5 STEPS WITH RAILING: A LOT
TURNING FROM BACK TO SIDE WHILE IN FLAT BAD: A LITTLE
DRESSING REGULAR LOWER BODY CLOTHING: A LITTLE
MOBILITY SCORE: 17
STANDING UP FROM CHAIR USING ARMS: A LITTLE
MOVING TO AND FROM BED TO CHAIR: A LITTLE
STANDING UP FROM CHAIR USING ARMS: A LITTLE
WALKING IN HOSPITAL ROOM: A LITTLE
MOVING FROM LYING ON BACK TO SITTING ON SIDE OF FLAT BED WITH BEDRAILS: A LITTLE
DAILY ACTIVITIY SCORE: 23

## 2025-06-10 ASSESSMENT — ACTIVITIES OF DAILY LIVING (ADL)
LACK_OF_TRANSPORTATION: NO
ADL_ASSISTANCE: INDEPENDENT
ADLS_ADDRESSED: YES

## 2025-06-10 ASSESSMENT — PAIN - FUNCTIONAL ASSESSMENT
PAIN_FUNCTIONAL_ASSESSMENT: 0-10
PAIN_FUNCTIONAL_ASSESSMENT: UNABLE TO SELF-REPORT
PAIN_FUNCTIONAL_ASSESSMENT: 0-10
PAIN_FUNCTIONAL_ASSESSMENT: UNABLE TO SELF-REPORT
PAIN_FUNCTIONAL_ASSESSMENT: 0-10

## 2025-06-10 ASSESSMENT — PAIN DESCRIPTION - LOCATION
LOCATION: KNEE
LOCATION: KNEE
LOCATION: LEG
LOCATION: LEG

## 2025-06-10 ASSESSMENT — PAIN DESCRIPTION - ORIENTATION
ORIENTATION: LEFT

## 2025-06-10 NOTE — H&P
History Of Present Illness  Odessa Thompson is a 55 y.o. female with a past medical history of diabetes mellitus type 2 (diet-controlled since bariatric surgery and significant weight loss), hypertension which is also diet-controlled, migraine, thrombocytosis, obstructive sleep apnea and other comorbidities who was admitted to the hospital status post left knee replacement total hybrid unilateral by Dr. Nicolas.  Patient tolerated the procedure.  Medicine was consulted for medical management of the comorbidities mentioned above.  Patient was seen in her room.  She states that she did receive oxycodone earlier but the knee pain is starting to come back again.  She complains of headache for which she received Tylenol.  She otherwise denies dizziness, chest pain, palpitation, difficulty breathing, nausea, vomiting, abdominal pain, blood in stool, urinary symptoms or skin rash.     Past Medical History  She has a past medical history of Anxiety, Arthritis (11/29/2024), Bariatric surgery status, Chronic pain of left knee, Delayed emergence from general anesthesia, History of sleep apnea, Hypertension, Migraines, Thrombocytosis, Type 2 diabetes mellitus, and Wears glasses.    She has no past medical history of Personal history of irradiation.    Surgical History  She has a past surgical history that includes MR angio head wo IV contrast (02/28/2022); Breast biopsy (Right, 2017); Hysterectomy (2018); and Bariatric Surgery.     Social History  She reports that she has never smoked. She has never used smokeless tobacco. She reports that she does not currently use alcohol. She reports that she does not use drugs.    Family History  Family History[1]     Allergies  Adhesive tape-silicones    Medications  Scheduled medications  Scheduled Medications[2]  Continuous medications  Continuous Medications[3]  PRN medications  PRN Medications[4]    Review of systems: 10-point review of systems is negative.     Physical  Exam  Constitutional: alert and oriented x 3, awake, cooperative, no acute distress  Skin: warm and dry  Head/Neck: Normocephalic, atraumatic  Eyes: clear sclera  ENMT: mucous membranes moist  Cardio: Regular rate and rhythm  Resp: CTA bilaterally, good respiratory effort  Gastrointestinal: Soft, nontender, nondistended, bowel sounds present  Musculoskeletal: Limited range of motion in the left lower extremity status post surgery.  Extremities: No edema, cyanosis, or clubbing  Neuro: Alert and oriented x 3, sensation is intact.  Patient moves bilateral upper extremities against resistance.  She moves the right lower extremity against resistance.  Strength was not assessed on the left side but she is able to move the left foot  Psychological: Appropriate mood and behavior     Last Recorded Vitals  /76   Pulse 60   Temp 36.3 °C (97.3 °F)   Resp 14   Wt 66.3 kg (146 lb 2.6 oz)   SpO2 99%     Relevant Results  Results for orders placed or performed during the hospital encounter of 06/09/25 (from the past 24 hours)   POCT pregnancy, urine   Result Value Ref Range    Preg Test, Ur Negative Negative   POCT GLUCOSE   Result Value Ref Range    POCT Glucose 69 (L) 74 - 99 mg/dL     Imaging  XR knee left 1-2 views  Result Date: 6/9/2025  No immediate complication after left total knee arthroplasty     MACRO: None   Signed by: Karthik Bryant 6/9/2025 4:04 PM Dictation workstation:   [id number]      Cardiology, Vascular, and Other Imaging  No other imaging results found for the past 7 days       Assessment/Plan   Assessment & Plan  Chronic pain of left knee    Osteoarthritis of left knee, unspecified osteoarthritis type    Odessa Thompson is a 55 y.o. female with a past medical history of diabetes mellitus type 2 (diet-controlled since bariatric surgery and significant weight loss), hypertension which is also diet-controlled, migraine, thrombocytosis, obstructive sleep apnea and other comorbidities who was  admitted to the hospital status post left knee replacement total hybrid unilateral.      Chronic left knee pain/osteoarthritis  -Status post surgery  -Postop day 0  -Pain management  -PT/OT eval  -Follow-up with the orthopedic surgery team    Diabetes mellitus type 2  - Diet controlled  - Status post significant weight loss after bariatric surgery  - She states that her last HbA1c was 5.2  - Will monitor    Hypertension  - Systolic blood pressure ranges from 105-154  - Patient states that she is no longer on blood pressure medication since the weight loss  - Will monitor    Headache  - Improved  - History of migraine.  She states that she takes rizatriptan as needed.    Depression/anxiety  - She takes Lexapro as needed    GERD  - Status post gastric bypass  - She takes famotidine at home.  Continue pantoprazole    DVT prophylaxis  - Patient is on aspirin.  On famotidine.  Continue PPI since patient is at high risk for gastrointestinal bleed due to her history of gastric bypass.             Vanessa Kellogg MD         [1]   Family History  Problem Relation Name Age of Onset    Hypertension Mother      Dilated cardiomyopathy Mother      Hypertension Father      Other (melanoma cancer) Father      Lung cancer Father      Breast cancer Maternal Grandmother     [2] acetaminophen, 650 mg, oral, q6h BELGICA  [START ON 6/10/2025] aspirin, 81 mg, oral, BID  ceFAZolin, 2 g, intravenous, q6h  docusate sodium, 100 mg, oral, BID  escitalopram, 20 mg, oral, Daily  [START ON 6/10/2025] pantoprazole, 40 mg, oral, Daily before breakfast  polyethylene glycol, 17 g, oral, Daily  [3] lactated Ringer's, 20 mL/hr, Last Rate: Stopped (06/09/25 1406)  lactated Ringer's, 100 mL/hr, Last Rate: 100 mL/hr (06/09/25 1955)  oxygen, 2 L/min, Last Rate: Stopped (06/09/25 2013)  [4] PRN medications: famotidine, morphine, naloxone, ondansetron **OR** ondansetron, oxyCODONE, oxyCODONE, oxyCODONE

## 2025-06-10 NOTE — PROGRESS NOTES
Odessa Thompson is a 55 y.o. female on day 0 of admission presenting with Chronic pain of left knee.    Subjective   Patient reports poorly tolerable pain. Otherwise without acute complaints. No overnight events.        Objective     Physical Exam  Constitutional:       General: She is not in acute distress.  Cardiovascular:      Rate and Rhythm: Normal rate.   Pulmonary:      Effort: Pulmonary effort is normal.   Musculoskeletal:      Comments: Post op dressing c/d/I. Calf and thigh supple. Neurovascularly intact   Skin:     General: Skin is warm and dry.   Neurological:      Mental Status: She is alert and oriented to person, place, and time.         Last Recorded Vitals  Blood pressure 167/86, pulse 81, temperature 36.8 °C (98.2 °F), temperature source Temporal, resp. rate 20, height 1.524 m (5'), weight 66.3 kg (146 lb 2.6 oz), SpO2 96%.  Intake/Output last 3 Shifts:  I/O last 3 completed shifts:  In: 1765 (26.6 mL/kg) [P.O.:120; I.V.:1645 (24.8 mL/kg)]  Out: 350 (5.3 mL/kg) [Urine:300 (0.1 mL/kg/hr); Blood:50]  Weight: 66.3 kg     Relevant Results               Results for orders placed or performed during the hospital encounter of 06/09/25 (from the past 24 hours)   POCT GLUCOSE   Result Value Ref Range    POCT Glucose 69 (L) 74 - 99 mg/dL   CBC   Result Value Ref Range    WBC 8.4 4.4 - 11.3 x10*3/uL    nRBC 0.0 0.0 - 0.0 /100 WBCs    RBC 3.80 (L) 4.00 - 5.20 x10*6/uL    Hemoglobin 11.3 (L) 12.0 - 16.0 g/dL    Hematocrit 35.7 (L) 36.0 - 46.0 %    MCV 94 80 - 100 fL    MCH 29.7 26.0 - 34.0 pg    MCHC 31.7 (L) 32.0 - 36.0 g/dL    RDW 12.9 11.5 - 14.5 %    Platelets 234 150 - 450 x10*3/uL   Basic metabolic panel   Result Value Ref Range    Glucose 94 74 - 99 mg/dL    Sodium 137 136 - 145 mmol/L    Potassium 3.7 3.5 - 5.3 mmol/L    Chloride 104 98 - 107 mmol/L    Bicarbonate 25 21 - 32 mmol/L    Anion Gap 12 10 - 20 mmol/L    Urea Nitrogen 16 6 - 23 mg/dL    Creatinine 0.79 0.50 - 1.05 mg/dL    eGFR 90 >60  mL/min/1.73m*2    Calcium 8.2 (L) 8.6 - 10.3 mg/dL     XR knee left 1-2 views  Result Date: 6/9/2025  Interpreted By:  Karthik Bryant, STUDY: XR KNEE LEFT 1-2 VIEWS; ;  6/9/2025 3:44 pm   INDICATION: Signs/Symptoms:SP LTKA.     COMPARISON: 05/23/2025   ACCESSION NUMBER(S): [id number]   ORDERING CLINICIAN: CONI BISWAS   FINDINGS: Left knee, two views   Total knee arthroplasty in place. There is a moderate-sized effusion. Postsurgical changes soft tissues. No periprosthetic fracture or lucency seen       No immediate complication after left total knee arthroplasty     MACRO: None   Signed by: Karthik Bryant 6/9/2025 4:04 PM Dictation workstation:   [id number]                   Assessment & Plan  Chronic pain of left knee    Osteoarthritis of left knee, unspecified osteoarthritis type    This is a 55 year old female admitted s/p left TKR.    Plan:  - imaging and labs reviewed  - PT/OT  - WBAT  - diet  - DVT ppx  - multimodal pain control  - medicine consulted, appreciate input    Dispo: Plan for DC home when pain better controlled.     Patient discussed with Dr. Nicolas.       I spent 15 minutes in the professional and overall care of this patient.      SAMUEL Sinclair-CNP

## 2025-06-10 NOTE — PROGRESS NOTES
06/10/25 1112   Discharge Planning   Living Arrangements Children  (14 year old and 18 year old children)   Support Systems Children   Assistance Needed Patient is A&O X3, on room air, independent with ADLs and uses no DME at baseline. Patient has walker X2 to use post-op. Per patient her friend will copme to her home to assist her daily. Patient was driving prior to surgery. Patient voices no other DC needs other than new [facility] that referral was previously sent for prior to surgery.   Type of Residence Private residence   Number of Stairs to Enter Residence 4   Number of Stairs Within Residence 0   Do you have animals or pets at home? Yes   Type of Animals or Pets 1 dog   Who is requesting discharge planning? Provider   Home or Post Acute Services In home services   Type of Home Care Services Home PT   Expected Discharge Disposition Home H   Does the patient need discharge transport arranged? No   Financial Resource Strain   How hard is it for you to pay for the very basics like food, housing, medical care, and heating? Not hard   Housing Stability   In the last 12 months, was there a time when you were not able to pay the mortgage or rent on time? N   At any time in the past 12 months, were you homeless or living in a shelter (including now)? N   Transportation Needs   In the past 12 months, has lack of transportation kept you from medical appointments or from getting medications? no   In the past 12 months, has lack of transportation kept you from meetings, work, or from getting things needed for daily living? No   Stroke Family Assessment   Stroke Family Assessment Needed No   Intensity of Service   Intensity of Service 0-30 min

## 2025-06-10 NOTE — CARE PLAN
The patient's goals for the shift include      The clinical goals for the shift include pain control      Problem: Pain - Adult  Goal: Verbalizes/displays adequate comfort level or baseline comfort level  Outcome: Progressing     Problem: Safety - Adult  Goal: Free from fall injury  Outcome: Progressing     Problem: Discharge Planning  Goal: Discharge to home or other facility with appropriate resources  Outcome: Progressing     Problem: Chronic Conditions and Co-morbidities  Goal: Patient's chronic conditions and co-morbidity symptoms are monitored and maintained or improved  Outcome: Progressing     Problem: Nutrition  Goal: Nutrient intake appropriate for maintaining nutritional needs  Outcome: Progressing    Patient was medicated for pain per orders throughout the shift. Patient up to the bathroom and walking the moore with assistance and walker. Patient resting with call light within reach.

## 2025-06-10 NOTE — PROGRESS NOTES
Physical Therapy    Physical Therapy Treatment    Patient Name: Odessa Thompson  MRN: [id number]  Department: [address]  Room: [address] [place] [address]  Today's Date: 6/10/2025  Time Calculation  Start Time: 1425  Stop Time: 1445  Time Calculation (min): 20 min         Assessment/Plan   PT Assessment  PT Assessment Results: Decreased strength, Decreased endurance, Impaired balance, Decreased mobility  Rehab Prognosis: Excellent  Barriers to Discharge Home: Physical needs, Caregiver assistance  Caregiver Assistance: Caregiver assistance needed per identified barriers - however, level of patient's required assistance exceeds assistance available at home  Physical Needs: Stair navigation into home limited by function/safety, Intermittent mobility assistance needed, Intermittent ADL assistance needed, High falls risk due to function or environment  Evaluation/Treatment Tolerance: Other (Comment) (Elevated pain levels, dizziness, limited mobility tolerance)  Medical Staff Made Aware: Yes  Strengths: Ability to acquire knowledge, Insight into problems  Barriers to Participation: Housing layout, Support of Caregivers  End of Session Communication: Bedside nurse  Assessment Comment: Patient continues with elevated levels of pain, received pain meds at 1215 per patient. Patient reports poor PO intake post op (primarily saltine crackers). Patient with limited tolerance for mobility with use of 2WW, has two sons at home who can be somewhat helpful per patient. Continue to rec remaining overnight for continued advanacement of mobility and safety upon home going. Rec LOW intensity.  End of Session Patient Position: Up in chair, Alarm off, not on at start of session (no alarm necessary per staff, all needs within reach)     PT Plan  Treatment/Interventions: Bed mobility, Transfer training, Gait training, Stair training, Balance training, Strengthening, Endurance training, Range of motion, Therapeutic exercise  PT Plan: Ongoing PT  PT Frequency:  Daily  PT Discharge Recommendations: Low intensity level of continued care  Equipment Recommended upon Discharge: Wheeled walker (owns)  PT Recommended Transfer Status: Assist x1  PT - OK to Discharge: Yes (per PT POC)    PT Visit Info:  PT Received On: 06/10/25  Response to Previous Treatment: Patient with no complaints from previous session.     General Visit Information:   General  Reason for Referral: Impaired functional mobility; L TKA  Referred By: Lowell Nicolas  Past Medical History Relevant to Rehab: diabetes mellitus type 2 (diet-controlled since bariatric surgery and significant weight loss), hypertension which is also diet-controlled, migraine, thrombocytosis, obstructive sleep apnea  Family/Caregiver Present: No  Prior to Session Communication: Bedside nurse  Patient Position Received: Up in chair, Alarm off, not on at start of session  General Comment: Patient agreeable to PT tx to determine if appropriate to d/c home    Subjective   Precautions:  Precautions  LE Weight Bearing Status: Weight Bearing as Tolerated  Medical Precautions: Fall precautions (masimo)  Post-Surgical Precautions: Left total knee precautions            Objective   Pain:  Pain Assessment  Pain Assessment: 0-10  0-10 (Numeric) Pain Score: 6  Pain Type: Surgical pain  Pain Location: Knee  Pain Orientation: Left  Pain Interventions: Repositioned, Cold applied, Ambulation/increased activity  Cognition:  Cognition  Overall Cognitive Status: Within Functional Limits  Orientation Level: Oriented X4  Coordination:  Movements are Fluid and Coordinated: Yes  Postural Control:  Postural Control  Postural Control: Within Functional Limits  Static Sitting Balance  Static Sitting-Balance Support: No upper extremity supported, Feet supported  Static Sitting-Level of Assistance: Independent  Dynamic Sitting Balance  Dynamic Sitting-Balance Support: No upper extremity supported, Feet supported  Dynamic Sitting-Level of Assistance:  Independent  Dynamic Sitting-Balance: Lateral lean  Static Standing Balance  Static Standing-Balance Support: Bilateral upper extremity supported  Static Standing-Level of Assistance: Contact guard  Dynamic Standing Balance  Dynamic Standing-Balance Support: Bilateral upper extremity supported  Dynamic Standing-Level of Assistance: Contact guard  Dynamic Standing-Balance: Turning    Activity Tolerance:  Activity Tolerance  Endurance: Tolerates 10 - 20 min exercise with multiple rests  Treatments:  Therapeutic Exercise  Therapeutic Exercise Performed: Yes  Therapeutic Exercise Activity 1: Patient reports completing AP, heel slides, quad sets independently.  Therapeutic Exercise Activity 2: AA SAQ x 15, hip abduction x 15      Ambulation/Gait Training  Ambulation/Gait Training Performed: Yes  Ambulation/Gait Training 1  Surface 1: Level tile  Device 1: Rolling walker  Assistance 1: Contact guard  Quality of Gait 1:  (decreaesd step length, increased demand on B UE, decreased stance phase R)  Comments/Distance (ft) 1: 15' (continues with c/o dizziness in standing limiting safe distances for ambulation)  Transfers  Transfer: Yes  Transfer 1  Transfer From 1: Sit to, Stand to  Transfer to 1: Sit, Stand  Technique 1: Sit to stand, Stand to sit  Transfer Device 1: Walker  Transfer Level of Assistance 1: Contact guard         Outcome Measures:  [facility] Basic Mobility  Turning from your back to your side while in a flat bed without using bedrails: A little  Moving from lying on your back to sitting on the side of a flat bed without using bedrails: A little  Moving to and from bed to chair (including a wheelchair): A little  Standing up from a chair using your arms (e.g. wheelchair or bedside chair): A little  To walk in hospital room: A little  Climbing 3-5 steps with railing: A lot  Basic Mobility - Total Score: 17    Education Documentation  Handouts, taught by Madhuri Alvarado PT at 6/10/2025  3:00 PM.  Learner:  Patient  Readiness: Acceptance  Method: Explanation, Handout  Response: Verbalizes Understanding, Demonstrated Understanding  Comment: Importance of staff assist for mobility, continued use of 2WW, ice and elevation for pain control, HEP 3x/day    Precautions, taught by Madhuri Alvarado PT at 6/10/2025  3:00 PM.  Learner: Patient  Readiness: Acceptance  Method: Explanation, Handout  Response: Verbalizes Understanding, Demonstrated Understanding  Comment: Importance of staff assist for mobility, continued use of 2WW, ice and elevation for pain control, HEP 3x/day    Body Mechanics, taught by Madhuri Alvarado PT at 6/10/2025  3:00 PM.  Learner: Patient  Readiness: Acceptance  Method: Explanation, Handout  Response: Verbalizes Understanding, Demonstrated Understanding  Comment: Importance of staff assist for mobility, continued use of 2WW, ice and elevation for pain control, HEP 3x/day    Home Exercise Program, taught by Madhuri Alvarado PT at 6/10/2025  3:00 PM.  Learner: Patient  Readiness: Acceptance  Method: Explanation, Handout  Response: Verbalizes Understanding, Demonstrated Understanding  Comment: Importance of staff assist for mobility, continued use of 2WW, ice and elevation for pain control, HEP 3x/day    Mobility Training, taught by Madhuri Alvarado PT at 6/10/2025  3:00 PM.  Learner: Patient  Readiness: Acceptance  Method: Explanation, Handout  Response: Verbalizes Understanding, Demonstrated Understanding  Comment: Importance of staff assist for mobility, continued use of 2WW, ice and elevation for pain control, HEP 3x/day    Handouts, taught by Madhuri Alvarado PT at 6/10/2025 12:51 PM.  Learner: Patient  Readiness: Acceptance  Method: Explanation, Demonstration, Handout  Response: Verbalizes Understanding, Demonstrated Understanding  Comment: Importance of staff assist for mobility, continued use of 2WW, ice and elevation for pain control, HEP 3x/day    Precautions, taught by Madhuri Alvarado PT at 6/10/2025  12:51 PM.  Learner: Patient  Readiness: Acceptance  Method: Explanation, Demonstration, Handout  Response: Verbalizes Understanding, Demonstrated Understanding  Comment: Importance of staff assist for mobility, continued use of 2WW, ice and elevation for pain control, HEP 3x/day    Body Mechanics, taught by Madhuri Alvarado PT at 6/10/2025 12:51 PM.  Learner: Patient  Readiness: Acceptance  Method: Explanation, Demonstration, Handout  Response: Verbalizes Understanding, Demonstrated Understanding  Comment: Importance of staff assist for mobility, continued use of 2WW, ice and elevation for pain control, HEP 3x/day    Home Exercise Program, taught by Madhuri Alvarado PT at 6/10/2025 12:51 PM.  Learner: Patient  Readiness: Acceptance  Method: Explanation, Demonstration, Handout  Response: Verbalizes Understanding, Demonstrated Understanding  Comment: Importance of staff assist for mobility, continued use of 2WW, ice and elevation for pain control, HEP 3x/day    Mobility Training, taught by Madhuri Alvarado PT at 6/10/2025 12:51 PM.  Learner: Patient  Readiness: Acceptance  Method: Explanation, Demonstration, Handout  Response: Verbalizes Understanding, Demonstrated Understanding  Comment: Importance of staff assist for mobility, continued use of 2WW, ice and elevation for pain control, HEP 3x/day    Education Comments  No comments found.        OP EDUCATION:       Encounter Problems       Encounter Problems (Active)       Mobility       Patient will demonstrate good understanding of bed mobility, transfers, ambulation, stair negotiation and HEP for safe home going.   (Progressing)       Start:  06/10/25    Expected End:  06/24/25               Pain - Adult

## 2025-06-10 NOTE — CARE PLAN
The patient's goals for the shift include  rest    The clinical goals for the shift include pain control and tolerate working with therapy

## 2025-06-10 NOTE — PROGRESS NOTES
Patient: Odessa Thompson  Room/bed: 146/146-A  Admitted on: 6/9/2025    Age: 55 y.o.   Gender: female  Code Status:  Full Code   Admitting Dx: Chronic pain of left knee [M25.562, G89.29]  Osteoarthritis of left knee, unspecified osteoarthritis type [M17.12]    MRN: [id number]  PCP: Deb Raymond MD       Subjective   Seen and examined in her room this AM. Awake and alert. Very anxious regarding her acute pain. She feels frustrated that she was unprepared for this severity of pain. Intermittent nausea. She denies chest pain, breathing difficulties, abdominal pain, V/D/C, fever, or chills.      Objective    Physical Exam   Constitutional: A&O x 3; anxious; cooperative  Eyes: EOM's intact  HEENT: Normocephalic, Atraumatic. Oral mucosa moist.   Neck: Supple. No JVD, lymphadenopathy.   Lungs: CTAB with fair air movement. Respirations even and unlabored on room air.   Heart: RRR  Abdomen: Soft, non-tender, non-distended, +BS  MS/Extremities: FITZPATRICK x 4 with LLE pain/decreased ROM. No edema. Peripheral pulses intact bilaterally.   Neuro: A&O x3; no focal deficits; gross motor and sensation intact.   Skin: Warm and dry. No rashes or lesions  Psych: Normal affect.      Temp:  [36.2 °C (97.2 °F)-36.8 °C (98.2 °F)] 36.8 °C (98.2 °F)  Heart Rate:  [56-81] 81  Resp:  [10-20] 20  BP: (105-174)/(58-96) 167/86    Vitals:    06/09/25 1017   Weight: 66.3 kg (146 lb 2.6 oz)             I/Os    Intake/Output Summary (Last 24 hours) at 6/10/2025 1358  Last data filed at 6/10/2025 1044  Gross per 24 hour   Intake 2640 ml   Output 350 ml   Net 2290 ml       Labs:   Results from last 72 hours   Lab Units 06/10/25  0508   SODIUM mmol/L 137   POTASSIUM mmol/L 3.7   CHLORIDE mmol/L 104   CO2 mmol/L 25   BUN mg/dL 16   CREATININE mg/dL 0.79   GLUCOSE mg/dL 94   CALCIUM mg/dL 8.2*   ANION GAP mmol/L 12   EGFR mL/min/1.73m*2 90      Results from last 72 hours   Lab Units 06/10/25  0508   WBC AUTO x10*3/uL 8.4   HEMOGLOBIN g/dL 11.3*   HEMATOCRIT  % 35.7*   PLATELETS AUTO x10*3/uL 234      Lab Results   Component Value Date    CALCIUM 8.2 (L) 06/10/2025     Micro/ID:   Susceptibility data from last 90 days.  Collected Specimen Info Organism   05/23/25 Swab from Nares/Axilla/Groin Methicillin Susceptible Staphylococcus aureus (MSSA)       Images:  XR knee left 1-2 views  Narrative: Interpreted By:  Karthik Bryant,   STUDY:  XR KNEE LEFT 1-2 VIEWS; ;  6/9/2025 3:44 pm      INDICATION:  Signs/Symptoms:SP LTKA.          COMPARISON:  05/23/2025      ACCESSION NUMBER(S):  [id number]      ORDERING CLINICIAN:  CONI BISWAS      FINDINGS:  Left knee, two views      Total knee arthroplasty in place. There is a moderate-sized effusion.  Postsurgical changes soft tissues. No periprosthetic fracture or  lucency seen      Impression: No immediate complication after left total knee arthroplasty          MACRO:  None      Signed by: Karthik Bryant 6/9/2025 4:04 PM  Dictation workstation:   [id number]       Meds    Scheduled medications  Scheduled Medications[1]  Continuous medications  Continuous Medications[2]  PRN medications  PRN Medications[3]     Assessment and Plan    Odessa Thompson is a 55 y.o. female with a medical history of DM II, HTN, and OA who presented to [facility] for an elective left TKA by Dr. Nicolas. Consulted for medical management.     Hypertension  -Reports no current BP agents since her bariatric surgery  -Will monitor closely and add agent if needed.  -This /90; will add PRN Hydralazine for SBP>150    Left Knee Osteoarthritis  -S/P left TKA by Dr. Nicolas on 6/9/25  -Incisional care, antibiotics, activity restrictions per orthopedics surgery.  -PT/OT to follow. WBAT.   -Medicate for pain  -Maintain bowel regimen  -Advised IS use, mobilization.   -Monitor H&H for ABLA. Hgb stable at 11.3.   -DVT prophylaxis per surgery: ASA 81mg BID.   -Afebrile. No leukocytosis.     Diabetes Mellitus Type II - Diet Controlled  -HgbA1c 5.2  -Will monitor and  add Insulin Sliding Scale if needed  -Glucose 97 on BMP    Depression/Anxiety  -Anxious on exam  -On Lexapro    GERD  -H/O bariatric surgery  -On PPI  -Avoid NSAID's    Fluids/Electrolytes/Nutrition  -Laboratory data reviewed: BMP/CBC.   -Electrolytes stable.   -No nutritional concerns at this time.      Disposition  -Plan of care discussed with nursing staff, orthopedics APRN.  -Discharge per orthopedics.       Joanna Pineda, APRN-CNP        [1] acetaminophen, 650 mg, oral, q6h BELGICA  aspirin, 81 mg, oral, BID  docusate sodium, 100 mg, oral, BID  escitalopram, 20 mg, oral, Daily  pantoprazole, 40 mg, oral, Daily before breakfast  polyethylene glycol, 17 g, oral, Daily  [2] lactated Ringer's, 100 mL/hr, Last Rate: Stopped (06/10/25 0837)  oxygen, 2 L/min, Last Rate: Stopped (06/09/25 2013)  [3] PRN medications: famotidine, hydrALAZINE, morphine, naloxone, ondansetron **OR** ondansetron, oxyCODONE, oxyCODONE, oxyCODONE

## 2025-06-10 NOTE — PROGRESS NOTES
Physical Therapy    Physical Therapy Evaluation & Treatment    Patient Name: Odessa Thompson  MRN: [id number]  Department: [address]  Room: [address] [place] [address]  Today's Date: 6/10/2025   Time Calculation  Start Time: 0847  Stop Time: 0857    Start Time: 1008  Stop Time: 1042  Time Calculation (min): 44 min    Assessment/Plan   PT Assessment  PT Assessment Results: Decreased strength, Decreased endurance, Impaired balance, Decreased mobility  Rehab Prognosis: Excellent  Barriers to Discharge Home: Physical needs, Caregiver assistance  Caregiver Assistance: Caregiver assistance needed per identified barriers - however, level of patient's required assistance exceeds assistance available at home  Physical Needs: Stair navigation into home limited by function/safety, Intermittent mobility assistance needed, Intermittent ADL assistance needed, High falls risk due to function or environment  Evaluation/Treatment Tolerance: Other (Comment) (Elevated pain levels, dizziness, limited mobility tolerance)  Medical Staff Made Aware: Yes  Strengths: Ability to acquire knowledge, Insight into problems  Barriers to Participation: Housing layout, Support of Caregivers  End of Session Communication: Bedside nurse  Assessment Comment: Patient continues with elevated levels of pain, received morphine which brought pain to 6/10. Patient with limited tolerance for mobility with use of 2WW, has two sons at home who can be somewhat helpful per patient. Discussed with RN and NP, rec patient remain overnight for pain management and progression of mobility to return home safely. Will plan to see additional visit on this date.  End of Session Patient Position: Up in chair, Alarm off, not on at start of session      PT Plan  Treatment/Interventions: Bed mobility, Transfer training, Gait training, Stair training, Balance training, Strengthening, Endurance training, Range of motion, Therapeutic exercise  PT Plan: Ongoing PT  PT Frequency: Daily  PT Discharge  Recommendations: Low intensity level of continued care  Equipment Recommended upon Discharge: Wheeled walker (owns)  PT Recommended Transfer Status: Assist x1  PT - OK to Discharge: Yes (per PT POC)      Subjective     PT Visit Info:  PT Received On: 06/10/25  General Visit Information:  General  Reason for Referral: Impaired functional mobility; L TKA  Referred By: Lowell Nicolas  Past Medical History Relevant to Rehab: diabetes mellitus type 2 (diet-controlled since bariatric surgery and significant weight loss), hypertension which is also diet-controlled, migraine, thrombocytosis, obstructive sleep apnea  Family/Caregiver Present: No  Prior to Session Communication: Bedside nurse  Patient Position Received: Up in chair, Alarm off, not on at start of session  General Comment: Patient with high levels of pain, limited tolerance for functional mobility  Home Living:  Home Living  Type of Home: Mobile home  Lives With: Adult children (18 and 14 yr sons)  Home Adaptive Equipment: Walker rolling or standard (2WW)  Home Layout: One level  Home Access: Stairs to enter with rails  Entrance Stairs-Rails: Left  Entrance Stairs-Number of Steps: 4  Bathroom Shower/Tub: Walk-in shower  Bathroom Equipment: Grab bars in shower  Prior Level of Function:  Prior Function Per Pt/Caregiver Report  Level of [place]: Independent with ADLs and functional transfers, Independent with homemaking with ambulation  Receives Help From: Family  ADL Assistance: Independent  Homemaking Assistance: Independent  Ambulatory Assistance: Independent  Vocational: Full time employment  Precautions:  Precautions  LE Weight Bearing Status: Weight Bearing as Tolerated  Medical Precautions: Fall precautions (Masimo)  Post-Surgical Precautions: Left total knee precautions           Objective   Pain:  Pain Assessment  Pain Assessment: 0-10  0-10 (Numeric) Pain Score: 6  Pain Type: Surgical pain  Pain Location: Knee  Pain Orientation: Left  Pain  Interventions: Cold applied, Repositioned, Ambulation/increased activity  Cognition:  Cognition  Overall Cognitive Status: Within Functional Limits  Orientation Level: Oriented X4    General Assessments:  General Observation  General Observation: Patient with continued c/o dizziness and elevated pain levels. RN aware     Activity Tolerance  Endurance: Tolerates 30 min exercise with multiple rests    Sensation  Light Touch: No apparent deficits    Strength  Strength Comments: R LE 4+/5, L hip 4/5, L knee 3+/5, L ankle 4+/5    Coordination  Movements are Fluid and Coordinated: Yes    Postural Control  Postural Control: Within Functional Limits    Static Sitting Balance  Static Sitting-Balance Support: No upper extremity supported, Feet supported  Static Sitting-Level of Assistance: Independent  Dynamic Sitting Balance  Dynamic Sitting-Balance Support: No upper extremity supported, Feet supported  Dynamic Sitting-Level of Assistance: Independent  Dynamic Sitting-Balance: Lateral lean    Static Standing Balance  Static Standing-Balance Support: Bilateral upper extremity supported  Static Standing-Level of Assistance: Contact guard  Dynamic Standing Balance  Dynamic Standing-Balance Support: Bilateral upper extremity supported  Dynamic Standing-Level of Assistance: Contact guard  Dynamic Standing-Balance: Turning  Functional Assessments:  ADL  ADL's Addressed: Yes (Restroom use: independent for perosnal hygiene, CGA for transfer)         Transfers  Transfer: Yes  Transfer 1  Transfer From 1: Sit to, Stand to  Transfer to 1: Sit, Stand  Technique 1: Sit to stand, Stand to sit  Transfer Device 1: Walker  Transfer Level of Assistance 1: Contact guard    Ambulation/Gait Training  Ambulation/Gait Training Performed: Yes  Ambulation/Gait Training 1  Surface 1: Level tile  Device 1: Rolling walker  Assistance 1: Contact guard  Comments/Distance (ft) 1: 15'; 10' (Patient required chair to be brought to her when ambulating from  toilet to chair d/t dizziness)    Treatments:  Early morning: patient with elevated pain levels, required increased time to discuss pain management options, discussed with NP and RN. Importance of pain control in order to fully participate in therapy assessment.      Patient encouraged to complete supine ther ex 3x/day. Reviewed 15  reps each: ankle pumps, quad sets, hip abd/add, heel slides, SAQ. Patient advised home care will advance ther ex as tolerated   Encouraged to take short duration ambulation bouts hourly during waking hours with the use of 2WW, focusing on gait pattern.   Educated on the importance of avoiding remaining in one position for extended period of time. Encouraged pain and edema control with use of ice, elevation and activity pacing. Patient verbalized understanding.   Reiterated no pillows/towels etc under the knee   Reviewed TKA precautions   Home going packet reviewed and provided to patient. Patient verbalized understanding.     Reviewed car transfer, patient verbalized understanding. Recommended pushing seat back as far as possible, don't use door as support, recline seat to provide increased space for hip mobility. Sit first and then swivel into vehicle. Plans to d/c into a [employer].     Ambulation/Gait Training  Ambulation/Gait Training Performed: Yes  Ambulation/Gait Training 1  Surface 1: Level tile  Device 1: Rolling walker  Assistance 1: Contact guard  Comments/Distance (ft) 1: 15'; 10' (Patient required chair to be brought to her when ambulating from toilet to chair d/t dizziness)  Transfers  Transfer: Yes  Transfer 1  Transfer From 1: Sit to, Stand to  Transfer to 1: Sit, Stand  Technique 1: Sit to stand, Stand to sit  Transfer Device 1: Walker  Transfer Level of Assistance 1: Contact guard       Outcome Measures:  [facility] Basic Mobility  Turning from your back to your side while in a flat bed without using bedrails: A little  Moving from lying on your back to sitting on the side of a  flat bed without using bedrails: A little  Moving to and from bed to chair (including a wheelchair): A little  Standing up from a chair using your arms (e.g. wheelchair or bedside chair): A little  To walk in hospital room: A little  Climbing 3-5 steps with railing: A lot  Basic Mobility - Total Score: 17    Encounter Problems       Encounter Problems (Active)       Mobility       Patient will demonstrate good understanding of bed mobility, transfers, ambulation, stair negotiation and HEP for safe home going.   (Progressing)       Start:  06/10/25    Expected End:  06/24/25               Pain - Adult              Education Documentation  Handouts, taught by Madhuri Alvarado PT at 6/10/2025 12:51 PM.  Learner: Patient  Readiness: Acceptance  Method: Explanation, Demonstration, Handout  Response: Verbalizes Understanding, Demonstrated Understanding  Comment: Importance of staff assist for mobility, continued use of 2WW, ice and elevation for pain control, HEP 3x/day    Precautions, taught by Madhuri Alvarado PT at 6/10/2025 12:51 PM.  Learner: Patient  Readiness: Acceptance  Method: Explanation, Demonstration, Handout  Response: Verbalizes Understanding, Demonstrated Understanding  Comment: Importance of staff assist for mobility, continued use of 2WW, ice and elevation for pain control, HEP 3x/day    Body Mechanics, taught by Madhuri Alvarado PT at 6/10/2025 12:51 PM.  Learner: Patient  Readiness: Acceptance  Method: Explanation, Demonstration, Handout  Response: Verbalizes Understanding, Demonstrated Understanding  Comment: Importance of staff assist for mobility, continued use of 2WW, ice and elevation for pain control, HEP 3x/day    Home Exercise Program, taught by Madhuri Alvarado PT at 6/10/2025 12:51 PM.  Learner: Patient  Readiness: Acceptance  Method: Explanation, Demonstration, Handout  Response: Verbalizes Understanding, Demonstrated Understanding  Comment: Importance of staff assist for mobility, continued use  of 2WW, ice and elevation for pain control, HEP 3x/day    Mobility Training, taught by Madhuri Alvarado PT at 6/10/2025 12:51 PM.  Learner: Patient  Readiness: Acceptance  Method: Explanation, Demonstration, Handout  Response: Verbalizes Understanding, Demonstrated Understanding  Comment: Importance of staff assist for mobility, continued use of 2WW, ice and elevation for pain control, HEP 3x/day    Education Comments  No comments found.

## 2025-06-11 LAB
ANION GAP SERPL CALC-SCNC: 13 MMOL/L (ref 10–20)
BUN SERPL-MCNC: 9 MG/DL (ref 6–23)
CALCIUM SERPL-MCNC: 8.5 MG/DL (ref 8.6–10.3)
CHLORIDE SERPL-SCNC: 100 MMOL/L (ref 98–107)
CO2 SERPL-SCNC: 27 MMOL/L (ref 21–32)
CREAT SERPL-MCNC: 0.61 MG/DL (ref 0.5–1.05)
EGFRCR SERPLBLD CKD-EPI 2021: >90 ML/MIN/1.73M*2
ERYTHROCYTE [DISTWIDTH] IN BLOOD BY AUTOMATED COUNT: 12.9 % (ref 11.5–14.5)
GLUCOSE SERPL-MCNC: 114 MG/DL (ref 74–99)
HCT VFR BLD AUTO: 35.7 % (ref 36–46)
HGB BLD-MCNC: 12.4 G/DL (ref 12–16)
MCH RBC QN AUTO: 30.5 PG (ref 26–34)
MCHC RBC AUTO-ENTMCNC: 34.7 G/DL (ref 32–36)
MCV RBC AUTO: 88 FL (ref 80–100)
NRBC BLD-RTO: 0 /100 WBCS (ref 0–0)
PLATELET # BLD AUTO: 249 X10*3/UL (ref 150–450)
POTASSIUM SERPL-SCNC: 3.6 MMOL/L (ref 3.5–5.3)
RBC # BLD AUTO: 4.07 X10*6/UL (ref 4–5.2)
SODIUM SERPL-SCNC: 136 MMOL/L (ref 136–145)
WBC # BLD AUTO: 9.2 X10*3/UL (ref 4.4–11.3)

## 2025-06-11 PROCEDURE — 97530 THERAPEUTIC ACTIVITIES: CPT | Mod: GP

## 2025-06-11 PROCEDURE — 2500000001 HC RX 250 WO HCPCS SELF ADMINISTERED DRUGS (ALT 637 FOR MEDICARE OP): Performed by: NURSE PRACTITIONER

## 2025-06-11 PROCEDURE — 99232 SBSQ HOSP IP/OBS MODERATE 35: CPT | Performed by: NURSE PRACTITIONER

## 2025-06-11 PROCEDURE — 2500000002 HC RX 250 W HCPCS SELF ADMINISTERED DRUGS (ALT 637 FOR MEDICARE OP, ALT 636 FOR OP/ED): Performed by: NURSE PRACTITIONER

## 2025-06-11 PROCEDURE — 7100000011 HC EXTENDED STAY RECOVERY HOURLY - NURSING UNIT

## 2025-06-11 PROCEDURE — 97165 OT EVAL LOW COMPLEX 30 MIN: CPT | Mod: GO

## 2025-06-11 PROCEDURE — 36415 COLL VENOUS BLD VENIPUNCTURE: CPT | Performed by: NURSE PRACTITIONER

## 2025-06-11 PROCEDURE — 85027 COMPLETE CBC AUTOMATED: CPT | Performed by: NURSE PRACTITIONER

## 2025-06-11 PROCEDURE — 80048 BASIC METABOLIC PNL TOTAL CA: CPT | Performed by: NURSE PRACTITIONER

## 2025-06-11 PROCEDURE — 2500000004 HC RX 250 GENERAL PHARMACY W/ HCPCS (ALT 636 FOR OP/ED): Performed by: NURSE PRACTITIONER

## 2025-06-11 PROCEDURE — 97110 THERAPEUTIC EXERCISES: CPT | Mod: GP

## 2025-06-11 PROCEDURE — 97116 GAIT TRAINING THERAPY: CPT | Mod: GP

## 2025-06-11 RX ORDER — BISACODYL 5 MG
5 TABLET, DELAYED RELEASE (ENTERIC COATED) ORAL 2 TIMES DAILY
Status: DISCONTINUED | OUTPATIENT
Start: 2025-06-11 | End: 2025-06-12 | Stop reason: HOSPADM

## 2025-06-11 RX ORDER — FAMOTIDINE 20 MG/1
20 TABLET, FILM COATED ORAL 2 TIMES DAILY
Status: DISCONTINUED | OUTPATIENT
Start: 2025-06-11 | End: 2025-06-12 | Stop reason: HOSPADM

## 2025-06-11 RX ORDER — GABAPENTIN 300 MG/1
300 CAPSULE ORAL EVERY 8 HOURS SCHEDULED
Status: DISCONTINUED | OUTPATIENT
Start: 2025-06-11 | End: 2025-06-12 | Stop reason: HOSPADM

## 2025-06-11 RX ORDER — OMEPRAZOLE 20 MG/1
40 CAPSULE, DELAYED RELEASE ORAL
Status: DISCONTINUED | OUTPATIENT
Start: 2025-06-11 | End: 2025-06-11

## 2025-06-11 RX ADMIN — OXYCODONE HYDROCHLORIDE 10 MG: 10 TABLET ORAL at 09:20

## 2025-06-11 RX ADMIN — ACETAMINOPHEN 650 MG: 325 TABLET, FILM COATED ORAL at 05:58

## 2025-06-11 RX ADMIN — BISACODYL 5 MG: 5 TABLET, COATED ORAL at 13:01

## 2025-06-11 RX ADMIN — ASPIRIN 81 MG: 81 TABLET, DELAYED RELEASE ORAL at 09:18

## 2025-06-11 RX ADMIN — FAMOTIDINE 20 MG: 20 TABLET, FILM COATED ORAL at 12:59

## 2025-06-11 RX ADMIN — GABAPENTIN 300 MG: 300 CAPSULE ORAL at 12:59

## 2025-06-11 RX ADMIN — OXYCODONE HYDROCHLORIDE 5 MG: 5 TABLET ORAL at 04:36

## 2025-06-11 RX ADMIN — ACETAMINOPHEN 650 MG: 325 TABLET, FILM COATED ORAL at 00:08

## 2025-06-11 RX ADMIN — ONDANSETRON 4 MG: 2 INJECTION, SOLUTION INTRAMUSCULAR; INTRAVENOUS at 17:57

## 2025-06-11 RX ADMIN — GABAPENTIN 300 MG: 300 CAPSULE ORAL at 22:00

## 2025-06-11 RX ADMIN — ACETAMINOPHEN 650 MG: 325 TABLET, FILM COATED ORAL at 20:14

## 2025-06-11 RX ADMIN — OMEPRAZOLE 40 MG: 20 CAPSULE, DELAYED RELEASE ORAL at 12:59

## 2025-06-11 RX ADMIN — FAMOTIDINE 20 MG: 20 TABLET, FILM COATED ORAL at 20:14

## 2025-06-11 RX ADMIN — ACETAMINOPHEN 650 MG: 325 TABLET, FILM COATED ORAL at 11:45

## 2025-06-11 RX ADMIN — OXYCODONE HYDROCHLORIDE 10 MG: 10 TABLET ORAL at 15:19

## 2025-06-11 RX ADMIN — ASPIRIN 81 MG: 81 TABLET, DELAYED RELEASE ORAL at 20:36

## 2025-06-11 ASSESSMENT — COGNITIVE AND FUNCTIONAL STATUS - GENERAL
WALKING IN HOSPITAL ROOM: A LITTLE
TOILETING: A LITTLE
MOBILITY SCORE: 18
STANDING UP FROM CHAIR USING ARMS: A LITTLE
TURNING FROM BACK TO SIDE WHILE IN FLAT BAD: A LITTLE
MOVING FROM LYING ON BACK TO SITTING ON SIDE OF FLAT BED WITH BEDRAILS: A LITTLE
HELP NEEDED FOR BATHING: A LITTLE
MOVING TO AND FROM BED TO CHAIR: A LITTLE
DRESSING REGULAR LOWER BODY CLOTHING: A LITTLE
STANDING UP FROM CHAIR USING ARMS: A LITTLE
DAILY ACTIVITIY SCORE: 23
MOBILITY SCORE: 20
WALKING IN HOSPITAL ROOM: A LITTLE
MOVING TO AND FROM BED TO CHAIR: A LITTLE
DAILY ACTIVITIY SCORE: 19
DRESSING REGULAR LOWER BODY CLOTHING: A LITTLE
PERSONAL GROOMING: A LITTLE
CLIMB 3 TO 5 STEPS WITH RAILING: A LITTLE
DRESSING REGULAR UPPER BODY CLOTHING: A LITTLE
CLIMB 3 TO 5 STEPS WITH RAILING: A LITTLE

## 2025-06-11 ASSESSMENT — PAIN - FUNCTIONAL ASSESSMENT
PAIN_FUNCTIONAL_ASSESSMENT: 0-10

## 2025-06-11 ASSESSMENT — PAIN SCALES - GENERAL
PAINLEVEL_OUTOF10: 5 - MODERATE PAIN
PAINLEVEL_OUTOF10: 7
PAINLEVEL_OUTOF10: 6
PAINLEVEL_OUTOF10: 0 - NO PAIN
PAINLEVEL_OUTOF10: 7
PAINLEVEL_OUTOF10: 7
PAINLEVEL_OUTOF10: 6
PAINLEVEL_OUTOF10: 6
PAINLEVEL_OUTOF10: 7

## 2025-06-11 ASSESSMENT — PAIN DESCRIPTION - ORIENTATION
ORIENTATION: LEFT

## 2025-06-11 ASSESSMENT — ACTIVITIES OF DAILY LIVING (ADL)
ADL_ASSISTANCE: INDEPENDENT
LACK_OF_TRANSPORTATION: NO
BATHING_ASSISTANCE: MODERATE

## 2025-06-11 ASSESSMENT — PAIN DESCRIPTION - LOCATION
LOCATION: KNEE

## 2025-06-11 NOTE — CARE PLAN
The patient's goals for the shift include  rest    Problem: Pain - Adult  Goal: Verbalizes/displays adequate comfort level or baseline comfort level  Outcome: Progressing     Problem: Safety - Adult  Goal: Free from fall injury  Outcome: Progressing     Problem: Discharge Planning  Goal: Discharge to home or other facility with appropriate resources  Outcome: Progressing     Problem: Chronic Conditions and Co-morbidities  Goal: Patient's chronic conditions and co-morbidity symptoms are monitored and maintained or improved  Outcome: Progressing     Problem: Nutrition  Goal: Nutrient intake appropriate for maintaining nutritional needs  Outcome: Progressing     The clinical goals for the shift include Patient will have pain managed to allow for at least 5 hours of sleep this shift.

## 2025-06-11 NOTE — CARE PLAN
The patient's goals for the shift include      The clinical goals for the shift include Patient will have pain managed to allow for at least 5 hours of sleep this shift.      Problem: Pain - Adult  Goal: Verbalizes/displays adequate comfort level or baseline comfort level  Outcome: Progressing     Problem: Safety - Adult  Goal: Free from fall injury  Outcome: Progressing     Problem: Discharge Planning  Goal: Discharge to home or other facility with appropriate resources  Outcome: Progressing

## 2025-06-11 NOTE — PROGRESS NOTES
Odessa Thompson is a 55 y.o. female on day 0 of admission presenting with Chronic pain of left knee.    Subjective   Pt is post op day 2 from left total knee replacement with Dr. Nicolas 6/9/25.     No acute overnight events.  Pt sill with high level pain, minimal help with medication.   Numbness in LLE is improving. Now only with numbness in bottom of left foot.     She is tolerating PO.   No n/v today.   Urinating without diff.   Not sure if she has passed gas. Did not want to take colace or miralax. States she take dulcolax at home and wants that.   No CP or SOB.    Objective     Physical Exam  Vitals reviewed.   Constitutional:       General: She is not in acute distress.     Appearance: Normal appearance. She is not ill-appearing, toxic-appearing or diaphoretic.   HENT:      Head: Normocephalic and atraumatic.      Mouth/Throat:      Mouth: Mucous membranes are moist.   Eyes:      General: No scleral icterus.        Right eye: No discharge.         Left eye: No discharge.      Conjunctiva/sclera: Conjunctivae normal.   Cardiovascular:      Rate and Rhythm: Normal rate and regular rhythm.      Pulses: Normal pulses.      Heart sounds: Normal heart sounds. No murmur heard.     No friction rub. No gallop.   Pulmonary:      Effort: Pulmonary effort is normal. No respiratory distress.      Breath sounds: Normal breath sounds. No stridor. No wheezing, rhonchi or rales.   Chest:      Chest wall: No tenderness.   Abdominal:      General: Bowel sounds are normal. There is no distension.      Palpations: Abdomen is soft. There is no mass.      Tenderness: There is no abdominal tenderness. There is no guarding or rebound.      Hernia: No hernia is present.   Musculoskeletal:         General: Swelling and tenderness present.      Right lower leg: No edema.      Left lower leg: No edema.      Comments: Left knee silver surgical dressing in place. No strikethrough. Slight swelling around site. No ecchymosis. No numbness or  tingling. 4/5 strength LLE. Calf supple.      Skin:     General: Skin is warm and dry.      Capillary Refill: Capillary refill takes less than 2 seconds.   Neurological:      Mental Status: She is alert and oriented to person, place, and time.   Psychiatric:         Mood and Affect: Mood is anxious. Affect is inappropriate.         Behavior: Behavior is slowed. Behavior is cooperative.         Judgment: Judgment is inappropriate.         Last Recorded Vitals  Blood pressure 124/83, pulse 86, temperature 36.7 °C (98.1 °F), temperature source Temporal, resp. rate 17, height 1.524 m (5'), weight 66.3 kg (146 lb 2.6 oz), SpO2 92%.  Intake/Output last 3 Shifts:  I/O last 3 completed shifts:  In: 1640 (24.7 mL/kg) [P.O.:370; I.V.:1270 (19.2 mL/kg)]  Out: - (0 mL/kg)   Weight: 66.3 kg     Relevant Results    XR knee left 1-2 views  Result Date: 6/9/2025  Interpreted By:  Karthik Bryant, STUDY: XR KNEE LEFT 1-2 VIEWS; ;  6/9/2025 3:44 pm   INDICATION: Signs/Symptoms:SP LTKA.     COMPARISON: 05/23/2025   ACCESSION NUMBER(S): [id number]   ORDERING CLINICIAN: CONI BISWAS   FINDINGS: Left knee, two views   Total knee arthroplasty in place. There is a moderate-sized effusion. Postsurgical changes soft tissues. No periprosthetic fracture or lucency seen       No immediate complication after left total knee arthroplasty     MACRO: None   Signed by: Karthik Bryant 6/9/2025 4:04 PM Dictation workstation:   [id number]    ECG 12 Lead  Result Date: 6/2/2025  Normal sinus rhythm Normal ECG When compared with ECG of 16-DEC-2024 20:45, No significant change was found    XR lower extremity leg lengevaluation  Result Date: 5/24/2025  Interpreted By:  Karthik Bryant, STUDY: XR LOWER EXTREMITY LEG LENGTH EVALUATION; ;  5/23/2025 10:49 am   INDICATION: Signs/Symptoms:pre op total knee alignment.   ,M25.562 Pain in left knee,G89.29 Other chronic pain   COMPARISON: None.   ACCESSION NUMBER(S): [id number]   ORDERING CLINICIAN:  NELIDA QUINTANA   FINDINGS: Standing AP views of bilateral lower extremities. There is mild leg length discrepancy with the left leg shorter than the right. There is bilateral moderate genu varum.       Mild leg length discrepancy with the left leg shorter than the right Moderate genu varum bilaterally     MACRO: None   Signed by: Karthik Bryant 5/24/2025 10:07 AM Dictation workstation:   [id number]    XR knee left 4+ views  Result Date: 5/24/2025  Interpreted By:  Karthik Bryant, STUDY: XR KNEE LEFT 4+ VIEWS; ;  5/23/2025 10:49 am   INDICATION: Signs/Symptoms:knee pain.   ,M25.562 Pain in left knee,G89.29 Other chronic pain   COMPARISON: 12/03/2020   ACCESSION NUMBER(S): [id number]   ORDERING CLINICIAN: NELIDA QUINTANA   FINDINGS: Bilateral knees, four views   There is severe medial compartment joint space narrowing with large osteophytosis and sclerosis with genu varum. Mild degenerative change the lateral patellofemoral compartments. There is a small left knee effusion         Severe medial compartment arthritis bilaterally with genu varum   MACRO: None   Signed by: Karthik Bryant 5/24/2025 8:45 AM Dictation workstation:   [id number]    XR knee right 3 views  Result Date: 5/24/2025  Interpreted By:  Karthik Bryant, STUDY: XR KNEE LEFT 4+ VIEWS; ;  5/23/2025 10:49 am   INDICATION: Signs/Symptoms:knee pain.   ,M25.562 Pain in left knee,G89.29 Other chronic pain   COMPARISON: 12/03/2020   ACCESSION NUMBER(S): [id number]   ORDERING CLINICIAN: NELIDA QUINTANA   FINDINGS: Bilateral knees, four views   There is severe medial compartment joint space narrowing with large osteophytosis and sclerosis with genu varum. Mild degenerative change the lateral patellofemoral compartments. There is a small left knee effusion         Severe medial compartment arthritis bilaterally with genu varum   MACRO: None   Signed by: Karthik Bryant 5/24/2025 8:45 AM Dictation workstation:   [id number]      Scheduled  medications  Scheduled Medications[1]  Continuous medications  Continuous Medications[2]  PRN medications  PRN Medications[3]  Results for orders placed or performed during the hospital encounter of 06/09/25 (from the past 24 hours)   CBC   Result Value Ref Range    WBC 9.2 4.4 - 11.3 x10*3/uL    nRBC 0.0 0.0 - 0.0 /100 WBCs    RBC 4.07 4.00 - 5.20 x10*6/uL    Hemoglobin 12.4 12.0 - 16.0 g/dL    Hematocrit 35.7 (L) 36.0 - 46.0 %    MCV 88 80 - 100 fL    MCH 30.5 26.0 - 34.0 pg    MCHC 34.7 32.0 - 36.0 g/dL    RDW 12.9 11.5 - 14.5 %    Platelets 249 150 - 450 x10*3/uL   Basic metabolic panel   Result Value Ref Range    Glucose 114 (H) 74 - 99 mg/dL    Sodium 136 136 - 145 mmol/L    Potassium 3.6 3.5 - 5.3 mmol/L    Chloride 100 98 - 107 mmol/L    Bicarbonate 27 21 - 32 mmol/L    Anion Gap 13 10 - 20 mmol/L    Urea Nitrogen 9 6 - 23 mg/dL    Creatinine 0.61 0.50 - 1.05 mg/dL    eGFR >90 >60 mL/min/1.73m*2    Calcium 8.5 (L) 8.6 - 10.3 mg/dL                            Assessment & Plan  Chronic pain of left knee    Osteoarthritis of left knee, unspecified osteoarthritis type    Pt is post op day 2 from left total knee replacement with Dr. Nicolas 6/9/25.  - imaging reviewed  - labs reviewed and ordered for tomorrow am. Kidney function stable. Cbc stable.   - PT/OT as ordered.  - WBAT.  - regular diet.  - miralax and dulcolax 5 mg BID for bowel regimen.  - tylenol scheduled.  - gabapentin 300 mg TID scheduled.  - oxycodone PRN pain as ordered.   - changed PPI to omeprazole 40 mg per her request.  - famotidine 20 mg BID scheduled.    - medicine consult for medical management    - pt still with pain and therapy recommending snf due to her inability to perform tasks timely and safely due to pain. Gabapentin was added today for pain management, this may help but she may require SNF placement. Surgery will cont to follow.      I spent 35 minutes in the professional and overall care of this patient.    Dr. Nicolas updated on  plan of care.     Rose Mary Hyatt, APRN-CNP           [1] acetaminophen, 650 mg, oral, q6h BELGICA  aspirin, 81 mg, oral, BID  docusate sodium, 100 mg, oral, BID  escitalopram, 20 mg, oral, Daily  famotidine, 20 mg, oral, BID  gabapentin, 300 mg, oral, q8h BELGICA  omeprazole, 40 mg, oral, Daily before breakfast  polyethylene glycol, 17 g, oral, Daily    [2] oxygen, 2 L/min, Last Rate: Stopped (06/09/25 2013)    [3] PRN medications: hydrALAZINE, morphine, naloxone, ondansetron **OR** ondansetron, oxyCODONE, oxyCODONE, oxyCODONE

## 2025-06-11 NOTE — PROGRESS NOTES
Occupational Therapy    Evaluation    Patient Name: Odessa Thompson  MRN: [id number]  Department: [address]  Room: [address] [place] [address]  Today's Date: 6/11/2025  Time Calculation  Start Time: 1343  Stop Time: 1400  Time Calculation (min): 17 min    Assessment  IP OT Assessment  OT Assessment: Pt is a 54 yo F referred to occupational therapy for impaired self-care and functional mobility 2/2 hospitalization for chronic pain of L knee, s/p L TKA. Pt demonstrates impaired endurance, ADLs, and functional mobility this date. Pt required CGA for transfers and functional mobility, CGA for toileting and min A for LE dressing. Pt has decreased support at home and would benefit from continued OT services at the MOD intensity level to increase functional independence with ADLs and mobility.  Prognosis: Good  Barriers to Discharge Home: Caregiver assistance, Physical needs  Caregiver Assistance: Caregiver assistance needed per identified barriers - however, level of patient's required assistance exceeds assistance available at home  Physical Needs: Stair navigation into home limited by function/safety, Ambulating household distances limited by function/safety, Intermittent mobility assistance needed, Intermittent ADL assistance needed, High falls risk due to function or environment  Evaluation/Treatment Tolerance: Patient tolerated treatment well  Medical Staff Made Aware: Yes  End of Session Communication: Bedside nurse  End of Session Patient Position: Up in chair, Alarm off, not on at start of session (no alarm necessary per staff, all needs within reach)  Plan:  Treatment Interventions: ADL retraining, Functional transfer training, Endurance training, Patient/family training, Neuromuscular reeducation, Compensatory technique education  OT Frequency: 3 times per week  OT Discharge Recommendations: Moderate intensity level of continued care  OT Recommended Transfer Status: Stand by assist, Assist of 1  OT - OK to Discharge: Yes (per OT  POC)    Subjective   Current Problem:  1. Chronic pain of left knee  aspirin 81 mg chewable tablet    docusate sodium (Colace) 100 mg capsule    polyethylene glycol (Glycolax, Miralax) 17 gram/dose powder    naproxen (Naprosyn) 500 mg tablet    oxyCODONE-acetaminophen (Percocet) 5-325 mg tablet    Referral to Home Health    cefadroxil (Duricef) 500 mg capsule    Surgical Pathology Exam    Surgical Pathology Exam        OT Visit Info:  OT Received On: 06/11/25  General Visit Info:  General  Reason for Referral: Pt is a 56 yo F referred to occupational therapy for impaired self-care and functional mobility 2/2 hospitalization for chronic pain of L knee. Pt s/p L TKA  Referred By: Lowell Nicolas  Past Medical History Relevant to Rehab: diabetes mellitus type 2 (diet-controlled since bariatric surgery and significant weight loss), hypertension which is also diet-controlled, migraine, thrombocytosis, obstructive sleep apnea  Family/Caregiver Present: No  Prior to Session Communication: Bedside nurse  Patient Position Received: Up in chair, Alarm off, not on at start of session  Preferred Learning Style: verbal, visual  General Comment: Pt pleasant and agreeable to therapy session. Pt cleared by RN prior to session  Precautions:  LE Weight Bearing Status: Weight Bearing as Tolerated  Medical Precautions: Fall precautions  Post-Surgical Precautions: Left total knee precautions  Precautions Comment: masimo    Vital Signs Comment: Vitals stable throughout session    Pain:  Pain Assessment  Pain Assessment: 0-10  0-10 (Numeric) Pain Score: 6  Pain Type: Surgical pain  Pain Location: Knee  Pain Orientation: Left  Pain Interventions: Repositioned, Ambulation/increased activity (RN notified)  Response to Interventions: Content/relaxed    Objective   Cognition:  Overall Cognitive Status: Within Functional Limits  Arousal/Alertness: Appropriate responses to stimuli  Orientation Level: Oriented X4    Home Living:  Type of Home:  Mobile home  Lives With: Adult children (14 and 18 yr old sons)  Home Adaptive Equipment: Walker rolling or standard  Home Layout: One level  Home Access: Stairs to enter with rails  Entrance Stairs-Rails: Left  Entrance Stairs-Number of Steps: 4  Bathroom Shower/Tub: Walk-in shower  Bathroom Toilet: Standard  Bathroom Equipment: Grab bars in shower   Prior Function:  Level of Bear: Independent with ADLs and functional transfers, Independent with homemaking with ambulation  Receives Help From: Family  ADL Assistance: Independent  Homemaking Assistance: Independent  Ambulatory Assistance: Independent  Vocational: Full time employment    ADL:  Eating Assistance: Independent  Grooming Assistance: Stand by  Grooming Deficit: Wash/dry hands  Bathing Assistance: Moderate  UE Dressing Assistance: Stand by  LE Dressing Assistance: Minimal  LE Dressing Deficit: Don/doff R sock, Don/doff L sock  Toileting Assistance with Device: Minimal  Toileting Deficit: Steadying, Supervison/safety  Functional Assistance: Stand by  ADL Comments: Pt completed STS from chair and toilet w/ CGA and functional mobility in room with CGA and FWW. Pt completed LE dressing min A and toileting w/ CGA. Other ADLs anticipated.  Activity Tolerance:  Endurance: Tolerates 30 min exercise with multiple rests  Bed Mobility/Transfers:    Transfers  Transfer: Yes  Transfer 1  Transfer From 1: Chair with arms to  Transfer to 1: Stand  Technique 1: Sit to stand, Stand to sit  Transfer Device 1: Walker  Transfer Level of Assistance 1: Contact guard  Transfers 2  Transfer From 2: Toilet to  Transfer to 2: Stand  Technique 2: Sit to stand, Stand to sit  Transfer Device 2: Walker  Transfer Level of Assistance 2: Contact guard    Functional Mobility:  Functional Mobility  Functional Mobility Performed: Yes  Functional Mobility 1  Surface 1: Level tile  Device 1: Rolling walker  Assistance 1: Contact guard  Comments 1: Short functional mobility in room w/  FWW, slow pace  Sitting Balance:  Static Sitting Balance  Static Sitting-Balance Support: Feet supported  Static Sitting-Level of Assistance: Independent  Dynamic Sitting Balance  Dynamic Sitting-Balance Support: Feet supported  Dynamic Sitting-Level of Assistance: Independent  Dynamic Sitting-Balance: Forward lean  Standing Balance:  Static Standing Balance  Static Standing-Balance Support: Bilateral upper extremity supported  Static Standing-Level of Assistance: Close supervision  Dynamic Standing Balance  Dynamic Standing-Balance Support: Bilateral upper extremity supported  Dynamic Standing-Level of Assistance: Close supervision  Dynamic Standing-Balance: Turning    Sensation:  Light Touch: No apparent deficits  Strength:  Strength Comments: BUE grossly 4+/5  Coordination:  Movements are Fluid and Coordinated: Yes   Hand Function:  Hand Function  Gross Grasp: Functional  Coordination: Functional  Extremities:   RUE: Within Functional Limits   LUE: Within Functional Limits    Outcome Measures:   [facility] Daily Activity  Putting on and taking off regular lower body clothing: A little  Bathing (including washing, rinsing, drying): A little  Putting on and taking off regular upper body clothing: A little  Toileting, which includes using toilet, bedpan or urinal: A little  Taking care of personal grooming such as brushing teeth: A little  Eating Meals: None  Daily Activity - Total Score: 19    OT Adult Other Outcome Measures  4AT: 0/12  Patient scored 0/12 on 4AT indicatin considered delirium or severe cognitive impairment unlikely.    Patient's 4AT score:  Alertness: 0 - Normal (fully alert, but not agitated, throughout assessment)   AMT4: 0 - No mistakes   Attention: 0 - Achieves 7 months or more correctly   Acute change or fluctuating course: 0 - No    4 AT Score: 0/12     4 AT is a standardized assessment completed with patient to assess delirium. The 4AT assesses 4 items including alertness, abbreviated mental  test (AMT4), Attention, and acute change or fluctuating course.     This interpretation should not be used as a medical diagnosis and further medical assessment would be required.     Education Documentation  Body Mechanics, taught by Porsha Mao OT at 6/11/2025  2:20 PM.  Learner: Patient  Readiness: Acceptance  Method: Explanation  Response: Verbalizes Understanding  Comment: Pt educated on POC, DC recs, precautions and body mechanics when completing transfers and ADLs    Precautions, taught by Porsha Mao OT at 6/11/2025  2:20 PM.  Learner: Patient  Readiness: Acceptance  Method: Explanation  Response: Verbalizes Understanding  Comment: Pt educated on POC, DC recs, precautions and body mechanics when completing transfers and ADLs    ADL Training, taught by Porsha Mao OT at 6/11/2025  2:20 PM.  Learner: Patient  Readiness: Acceptance  Method: Explanation  Response: Verbalizes Understanding  Comment: Pt educated on POC, DC recs, precautions and body mechanics when completing transfers and ADLs    Goals:   Encounter Problems       Encounter Problems (Active)       ADLs       Patient will perform UB and LB bathing with stand by assist level of assistance and PRN bathroom equipment.       Start:  06/11/25    Expected End:  06/25/25            Patient with complete lower body dressing with stand by assist level of assistance donning and doffing all LE clothes  with PRN adaptive equipment while edge of bed  and standing       Start:  06/11/25    Expected End:  06/25/25            Patient will complete toileting including hygiene clothing management/hygiene with supervision level of assistance and PRN bathroom equipment.       Start:  06/11/25    Expected End:  06/25/25               BALANCE       Pt will maintain dynamic standing balance during ADL task with supervision level of assistance in order to demonstrate decreased risk of falling and improved postural control.       Start:  06/11/25    Expected  End:  06/25/25               MOBILITY       Patient will perform Functional mobility mod  Household distances/Community Distances with supervision level of assistance and least restrictive device in order to improve safety and functional mobility.       Start:  06/11/25    Expected End:  06/25/25

## 2025-06-11 NOTE — PROGRESS NOTES
06/11/25 1116   Discharge Planning   Living Arrangements Children  (14 year old and 18 year old children)   Support Systems Children   Assistance Needed Patient was re-evaluated by PT and now being recommended for MOD intensity. List of skilled nursing facilities was provided to the patient for review. Patient's preference are (in no particular order) [facility], [facility] and [facility]. Referrals sent via [facility]. Awaiting accepting facility at this time. Patient will need pre-cert to transition to SNF.   Type of Residence Private residence   Number of Stairs to Enter Residence 4   Number of Stairs Within Residence 0   Do you have animals or pets at home? Yes   Type of Animals or Pets 1 dog   Who is requesting discharge planning? Provider   Home or Post Acute Services In home services   Type of Home Care Services Home PT   Expected Discharge Disposition Home H   Financial Resource Strain   How hard is it for you to pay for the very basics like food, housing, medical care, and heating? Not hard   Housing Stability   In the last 12 months, was there a time when you were not able to pay the mortgage or rent on time? N   At any time in the past 12 months, were you homeless or living in a shelter (including now)? N   Transportation Needs   In the past 12 months, has lack of transportation kept you from medical appointments or from getting medications? no   In the past 12 months, has lack of transportation kept you from meetings, work, or from getting things needed for daily living? No

## 2025-06-11 NOTE — PROGRESS NOTES
Physical Therapy    Physical Therapy Treatment    Patient Name: Odessa Thompson  MRN: [id number]  Department: [address]  Room: [address] [place] [address]  Today's Date: 6/11/2025  Time Calculation  Start Time: 0925  Stop Time: 1025  Time Calculation (min): 60 min         Assessment/Plan   PT Assessment  PT Assessment Results: Decreased strength, Decreased endurance, Impaired balance, Decreased mobility  Rehab Prognosis: Excellent  Barriers to Discharge Home: Physical needs, Caregiver assistance  Caregiver Assistance: Caregiver assistance needed per identified barriers - however, level of patient's required assistance exceeds assistance available at home  Physical Needs: Stair navigation into home limited by function/safety, Intermittent mobility assistance needed, Intermittent ADL assistance needed, High falls risk due to function or environment  Evaluation/Treatment Tolerance: Patient limited by pain  Medical Staff Made Aware: Yes  Strengths: Ability to acquire knowledge, Insight into problems  Barriers to Participation: Housing layout, Support of Caregivers  End of Session Communication: Bedside nurse  Assessment Comment: Patient continues with elevated levels of pain. Patient reports poor PO intake post op (primarily saltine crackers). Patient with limited tolerance for mobility with use of 2WW, has two sons at home who can be somewhat helpful per patient. Due to continued high levels of pain, limited mobility and limited home going support. Rec MOD intensity PT intervention.  End of Session Patient Position: Up in chair, Alarm off, not on at start of session (no alarm necessary per staff, all needs within reach, B LE elevated, ice to L knee)  PT Plan  Inpatient/Swing Bed or Outpatient: Inpatient  PT Plan  Treatment/Interventions: Bed mobility, Transfer training, Gait training, Stair training, Balance training, Strengthening, Endurance training, Range of motion, Therapeutic exercise  PT Plan: Ongoing PT  PT Frequency: Daily  PT  Discharge Recommendations: Moderate intensity level of continued care  Equipment Recommended upon Discharge: Wheeled walker (owns)  PT Recommended Transfer Status: Assist x1  PT - OK to Discharge: Yes (per PT POC)    PT Visit Info:  PT Received On: 06/11/25  Response to Previous Treatment: Patient with no complaints from previous session.     General Visit Information:   General  Reason for Referral: Impaired functional mobility; L TKA  Referred By: Lowell Nicolas  Past Medical History Relevant to Rehab: diabetes mellitus type 2 (diet-controlled since bariatric surgery and significant weight loss), hypertension which is also diet-controlled, migraine, thrombocytosis, obstructive sleep apnea  Family/Caregiver Present: No  Prior to Session Communication: Bedside nurse  Patient Position Received: Up in chair, Alarm off, not on at start of session  General Comment: Patient agreeable to PT tx    Subjective   Precautions:  Precautions  LE Weight Bearing Status: Weight Bearing as Tolerated  Medical Precautions: Fall precautions (masimo)  Post-Surgical Precautions: Left total knee precautions            Objective   Pain:  Pain Assessment  Pain Assessment: 0-10  0-10 (Numeric) Pain Score: 7  Pain Type: Surgical pain  Pain Location: Knee  Pain Orientation: Left  Pain Interventions: Repositioned, Ambulation/increased activity, Cold applied (RN aware of pain)  Cognition:  Cognition  Overall Cognitive Status: Within Functional Limits  Orientation Level: Oriented X4  Coordination:  Movements are Fluid and Coordinated: Yes  Postural Control:  Postural Control  Postural Control: Within Functional Limits  Static Sitting Balance  Static Sitting-Balance Support: No upper extremity supported, Feet supported  Static Sitting-Level of Assistance: Independent  Dynamic Sitting Balance  Dynamic Sitting-Balance Support: No upper extremity supported, Feet supported  Dynamic Sitting-Level of Assistance: Independent  Dynamic Sitting-Balance:  Lateral lean  Static Standing Balance  Static Standing-Balance Support: Bilateral upper extremity supported  Static Standing-Level of Assistance: Contact guard  Dynamic Standing Balance  Dynamic Standing-Balance Support: Bilateral upper extremity supported  Dynamic Standing-Level of Assistance: Contact guard  Dynamic Standing-Balance: Turning    Activity Tolerance:  Activity Tolerance  Endurance: Tolerates 30 min exercise with multiple rests  Treatments:  Therapeutic Exercise  Therapeutic Exercise Performed: Yes  Therapeutic Exercise Activity 1:   Patient encouraged to complete supine ther ex 3x/day. Reviewed 15  reps each: ankle pumps, quad sets, hip abd/add, heel slides, SAQ. Patient advised home care will advance ther ex as tolerated   Encouraged to take short duration ambulation bouts hourly during waking hours with the use of 2WW, focusing on gait pattern.   Educated on the importance of avoiding remaining in one position for extended period of time. Encouraged pain and edema control with use of ice, elevation and activity pacing. Patient verbalized understanding.   Reiterated no pillows/towels etc under the knee   Reviewed TKA precautions   Home going packet reviewed and provided to patient. Patient verbalized understanding.     Reviewed car transfer, patient verbalized understanding. Recommended pushing seat back as far as possible, don't use door as support, recline seat to provide increased space for hip mobility. Sit first and then swivel into vehicle. Plans to d/c into a SUV. Min A L LE to complete in car simulator in therapy gym.          Ambulation/Gait Training  Ambulation/Gait Training Performed: Yes  Ambulation/Gait Training 1  Surface 1: Level tile  Device 1: Rolling walker  Assistance 1: Contact guard  Quality of Gait 1:  (decreaesd step length, increased demand on B UE, decreased stance phase R)  Comments/Distance (ft) 1: 15' x 4  Transfers  Transfer: Yes  Transfer 1  Transfer From 1: Sit to,  Stand to  Transfer to 1: Sit, Stand  Technique 1: Sit to stand, Stand to sit  Transfer Device 1: Walker  Transfer Level of Assistance 1: Contact guard    Stairs  Stairs: Yes  Stairs  Rails 1: Bilateral  Device 1: Railing  Assistance 1: Contact guard  Comment/Number of Steps 1: 4 (step to pattern, verbal cues for sequencing)  Stairs 2  Curb Step 2: Yes  Device 2: Wheeled walker  Assistance 2: Contact guard  Comment/Number of Steps 2: 1 (step to pattern, verbal cues for sequencing)    Outcome Measures:  [facility] Basic Mobility  Turning from your back to your side while in a flat bed without using bedrails: A little  Moving from lying on your back to sitting on the side of a flat bed without using bedrails: A little  Moving to and from bed to chair (including a wheelchair): A little  Standing up from a chair using your arms (e.g. wheelchair or bedside chair): A little  To walk in hospital room: A little  Climbing 3-5 steps with railing: A little  Basic Mobility - Total Score: 18    Education Documentation  Handouts, taught by Madhuri Alvarado PT at 6/11/2025 10:56 AM.  Learner: Patient  Readiness: Acceptance  Method: Explanation, Demonstration, Handout  Response: Verbalizes Understanding, Demonstrated Understanding  Comment: Importance of staff assist for mobility, continued use of 2WW, ice and elevation for pain control, HEP 3x/day    Precautions, taught by Madhuri Alvarado PT at 6/11/2025 10:56 AM.  Learner: Patient  Readiness: Acceptance  Method: Explanation, Demonstration, Handout  Response: Verbalizes Understanding, Demonstrated Understanding  Comment: Importance of staff assist for mobility, continued use of 2WW, ice and elevation for pain control, HEP 3x/day    Body Mechanics, taught by Madhuri Alvarado PT at 6/11/2025 10:56 AM.  Learner: Patient  Readiness: Acceptance  Method: Explanation, Demonstration, Handout  Response: Verbalizes Understanding, Demonstrated Understanding  Comment: Importance of staff assist for  mobility, continued use of 2WW, ice and elevation for pain control, HEP 3x/day    Home Exercise Program, taught by Madhuri Alvarado, PT at 6/11/2025 10:56 AM.  Learner: Patient  Readiness: Acceptance  Method: Explanation, Demonstration, Handout  Response: Verbalizes Understanding, Demonstrated Understanding  Comment: Importance of staff assist for mobility, continued use of 2WW, ice and elevation for pain control, HEP 3x/day    Mobility Training, taught by Madhuri Alvarado PT at 6/11/2025 10:56 AM.  Learner: Patient  Readiness: Acceptance  Method: Explanation, Demonstration, Handout  Response: Verbalizes Understanding, Demonstrated Understanding  Comment: Importance of staff assist for mobility, continued use of 2WW, ice and elevation for pain control, HEP 3x/day    Education Comments  No comments found.        OP EDUCATION:       Encounter Problems       Encounter Problems (Active)       Mobility       Patient will demonstrate good understanding of bed mobility, transfers, ambulation, stair negotiation and HEP for safe home going.   (Progressing)       Start:  06/10/25    Expected End:  06/24/25               Pain - Adult

## 2025-06-11 NOTE — PROGRESS NOTES
Patient: Odessa Thompson  Room/bed: 146/146-A  Admitted on: 6/9/2025    Age: 55 y.o.   Gender: female  Code Status:  Full Code   Admitting Dx: Chronic pain of left knee [M25.562, G89.29]  Osteoarthritis of left knee, unspecified osteoarthritis type [M17.12]    MRN: [id number]  PCP: Deb Raymond MD       Subjective   Seen and examined in her room this AM. Awake and alert. Still having some anxiety regarding her acute pain but does note some improvement in past 24 hours. She denies chest pain, breathing difficulties, abdominal pain, N/V/D/C, fever, or chills. Appetite improved    Objective    Physical Exam   Constitutional: A&O x 3; cooperative with mild anxiety  Eyes: EOM's intact  HEENT: Normocephalic, Atraumatic. Oral mucosa moist.   Neck: Supple. No JVD, lymphadenopathy.   Lungs: CTAB with fair air movement. Respirations even and unlabored on room air.   Heart: RRR  Abdomen: Soft, non-tender, non-distended, +BS  MS/Extremities: FITZPATRICK x 4 with LLE pain/decreased ROM. No edema. Peripheral pulses intact bilaterally.   Neuro: A&O x3; no focal deficits; gross motor and sensation intact.   Skin: Warm and dry. No rashes or lesions  Psych: Normal affect.      Temp:  [36.5 °C (97.7 °F)-36.7 °C (98.1 °F)] 36.7 °C (98.1 °F)  Heart Rate:  [81-94] 86  Resp:  [16-17] 17  BP: (124-172)/(83-92) 124/83    Vitals:    06/09/25 1017   Weight: 66.3 kg (146 lb 2.6 oz)             I/Os    Intake/Output Summary (Last 24 hours) at 6/11/2025 1343  Last data filed at 6/11/2025 0600  Gross per 24 hour   Intake 250 ml   Output --   Net 250 ml       Labs:   Results from last 72 hours   Lab Units 06/11/25  0800 06/10/25  0508   SODIUM mmol/L 136 137   POTASSIUM mmol/L 3.6 3.7   CHLORIDE mmol/L 100 104   CO2 mmol/L 27 25   BUN mg/dL 9 16   CREATININE mg/dL 0.61 0.79   GLUCOSE mg/dL 114* 94   CALCIUM mg/dL 8.5* 8.2*   ANION GAP mmol/L 13 12   EGFR mL/min/1.73m*2 >90 90      Results from last 72 hours   Lab Units 06/11/25  0800 06/10/25  4674    WBC AUTO x10*3/uL 9.2 8.4   HEMOGLOBIN g/dL 12.4 11.3*   HEMATOCRIT % 35.7* 35.7*   PLATELETS AUTO x10*3/uL 249 234      Lab Results   Component Value Date    CALCIUM 8.5 (L) 06/11/2025     Micro/ID:   Susceptibility data from last 90 days.  Collected Specimen Info Organism   05/23/25 Swab from Nares/Axilla/Groin Methicillin Susceptible Staphylococcus aureus (MSSA)       Images:  XR knee left 1-2 views  Narrative: Interpreted By:  Karthik Bryant,   STUDY:  XR KNEE LEFT 1-2 VIEWS; ;  6/9/2025 3:44 pm      INDICATION:  Signs/Symptoms:SP LTKA.          COMPARISON:  05/23/2025      ACCESSION NUMBER(S):  [id number]      ORDERING CLINICIAN:  CONI BISWAS      FINDINGS:  Left knee, two views      Total knee arthroplasty in place. There is a moderate-sized effusion.  Postsurgical changes soft tissues. No periprosthetic fracture or  lucency seen      Impression: No immediate complication after left total knee arthroplasty          MACRO:  None      Signed by: Karthik Bryant 6/9/2025 4:04 PM  Dictation workstation:   [id number]       Meds    Scheduled medications  Scheduled Medications[1]  Continuous medications  Continuous Medications[2]  PRN medications  PRN Medications[3]     Assessment and Plan    Odessa Thompson is a 55 y.o. female with a medical history of DM II, HTN, and OA who presented to [facility] for an elective left TKA by Dr. Nicolas. Consulted for medical management.     Hypertension  -Reports no current BP agents since her bariatric surgery  -Will monitor closely and add agent if needed.  -Continue PRN Hydralazine for SBP>150  --170's in past 24 hours    Left Knee Osteoarthritis  -S/P left TKA by Dr. Nicolas on 6/9/25  -Incisional care, antibiotics, activity restrictions per orthopedics surgery.  -PT/OT to follow. WBAT.   -Medicate for pain  -Maintain bowel regimen  -Advised IS use, mobilization.   -Monitor H&H for ABLA. Hgb stable at 12.4.   -DVT prophylaxis per surgery: ASA 81mg BID.   -Afebrile.  No leukocytosis.     Diabetes Mellitus Type II - Diet Controlled  -HgbA1c 5.2  -Will monitor and add Insulin Sliding Scale if needed  -Glucose 114 on AM BMP    Depression/Anxiety  -Less anxious on exam  -On Lexapro    GERD  -H/O bariatric surgery  -Requesting no PPI but back on Pepcid  -Avoid NSAID's    Fluids/Electrolytes/Nutrition  -Laboratory data reviewed: BMP/CBC.   -Electrolytes stable.   -No nutritional concerns at this time.      Disposition  -Plan of care discussed with medicine, Dr. Shukla, orthopedics APRN.  -Discharge per orthopedics.  Medically stable.       Joanna Pineda, APRN-CNP        [1] acetaminophen, 650 mg, oral, q6h BELGICA  aspirin, 81 mg, oral, BID  bisacodyl, 5 mg, oral, BID  escitalopram, 20 mg, oral, Daily  famotidine, 20 mg, oral, BID  gabapentin, 300 mg, oral, q8h BELGICA  omeprazole, 40 mg, oral, Daily before breakfast  polyethylene glycol, 17 g, oral, Daily     [2] oxygen, 2 L/min, Last Rate: Stopped (06/09/25 2013)     [3] PRN medications: hydrALAZINE, morphine, naloxone, ondansetron **OR** ondansetron, oxyCODONE, oxyCODONE, oxyCODONE

## 2025-06-12 VITALS
TEMPERATURE: 97.9 F | BODY MASS INDEX: 28.7 KG/M2 | SYSTOLIC BLOOD PRESSURE: 154 MMHG | OXYGEN SATURATION: 97 % | HEART RATE: 97 BPM | RESPIRATION RATE: 17 BRPM | WEIGHT: 146.16 LBS | DIASTOLIC BLOOD PRESSURE: 85 MMHG | HEIGHT: 60 IN

## 2025-06-12 LAB
ANION GAP SERPL CALC-SCNC: 11 MMOL/L (ref 10–20)
BUN SERPL-MCNC: 13 MG/DL (ref 6–23)
CALCIUM SERPL-MCNC: 8.5 MG/DL (ref 8.6–10.3)
CHLORIDE SERPL-SCNC: 100 MMOL/L (ref 98–107)
CO2 SERPL-SCNC: 28 MMOL/L (ref 21–32)
CREAT SERPL-MCNC: 0.6 MG/DL (ref 0.5–1.05)
EGFRCR SERPLBLD CKD-EPI 2021: >90 ML/MIN/1.73M*2
ERYTHROCYTE [DISTWIDTH] IN BLOOD BY AUTOMATED COUNT: 12.8 % (ref 11.5–14.5)
GLUCOSE SERPL-MCNC: 98 MG/DL (ref 74–99)
HCT VFR BLD AUTO: 33.6 % (ref 36–46)
HGB BLD-MCNC: 11.4 G/DL (ref 12–16)
MCH RBC QN AUTO: 30.5 PG (ref 26–34)
MCHC RBC AUTO-ENTMCNC: 33.9 G/DL (ref 32–36)
MCV RBC AUTO: 90 FL (ref 80–100)
NRBC BLD-RTO: 0 /100 WBCS (ref 0–0)
PLATELET # BLD AUTO: 249 X10*3/UL (ref 150–450)
POTASSIUM SERPL-SCNC: 3.7 MMOL/L (ref 3.5–5.3)
RBC # BLD AUTO: 3.74 X10*6/UL (ref 4–5.2)
SODIUM SERPL-SCNC: 135 MMOL/L (ref 136–145)
WBC # BLD AUTO: 10.3 X10*3/UL (ref 4.4–11.3)

## 2025-06-12 PROCEDURE — 2500000004 HC RX 250 GENERAL PHARMACY W/ HCPCS (ALT 636 FOR OP/ED): Performed by: NURSE PRACTITIONER

## 2025-06-12 PROCEDURE — 85027 COMPLETE CBC AUTOMATED: CPT | Performed by: NURSE PRACTITIONER

## 2025-06-12 PROCEDURE — 99232 SBSQ HOSP IP/OBS MODERATE 35: CPT | Performed by: NURSE PRACTITIONER

## 2025-06-12 PROCEDURE — 97530 THERAPEUTIC ACTIVITIES: CPT | Mod: GP

## 2025-06-12 PROCEDURE — 97535 SELF CARE MNGMENT TRAINING: CPT | Mod: GO,CO

## 2025-06-12 PROCEDURE — 80048 BASIC METABOLIC PNL TOTAL CA: CPT | Performed by: NURSE PRACTITIONER

## 2025-06-12 PROCEDURE — 2500000001 HC RX 250 WO HCPCS SELF ADMINISTERED DRUGS (ALT 637 FOR MEDICARE OP): Performed by: NURSE PRACTITIONER

## 2025-06-12 PROCEDURE — 99238 HOSP IP/OBS DSCHRG MGMT 30/<: CPT | Performed by: NURSE PRACTITIONER

## 2025-06-12 PROCEDURE — 97116 GAIT TRAINING THERAPY: CPT | Mod: GP

## 2025-06-12 PROCEDURE — 36415 COLL VENOUS BLD VENIPUNCTURE: CPT | Performed by: NURSE PRACTITIONER

## 2025-06-12 PROCEDURE — 97530 THERAPEUTIC ACTIVITIES: CPT | Mod: GO,CO,59

## 2025-06-12 PROCEDURE — 7100000011 HC EXTENDED STAY RECOVERY HOURLY - NURSING UNIT

## 2025-06-12 RX ORDER — ONDANSETRON HYDROCHLORIDE 2 MG/ML
4 INJECTION, SOLUTION INTRAVENOUS EVERY 6 HOURS PRN
Status: DISCONTINUED | OUTPATIENT
Start: 2025-06-12 | End: 2025-06-12 | Stop reason: HOSPADM

## 2025-06-12 RX ORDER — GABAPENTIN 300 MG/1
300 CAPSULE ORAL EVERY 8 HOURS SCHEDULED
Qty: 30 CAPSULE | Refills: 0 | Status: SHIPPED | OUTPATIENT
Start: 2025-06-12 | End: 2025-06-20 | Stop reason: SDUPTHER

## 2025-06-12 RX ORDER — ONDANSETRON 4 MG/1
4 TABLET, FILM COATED ORAL EVERY 6 HOURS PRN
Status: DISCONTINUED | OUTPATIENT
Start: 2025-06-12 | End: 2025-06-12 | Stop reason: HOSPADM

## 2025-06-12 RX ORDER — CEFADROXIL 500 MG/1
500 CAPSULE ORAL EVERY 12 HOURS SCHEDULED
Status: DISCONTINUED | OUTPATIENT
Start: 2025-06-12 | End: 2025-06-12 | Stop reason: HOSPADM

## 2025-06-12 RX ORDER — OXYCODONE AND ACETAMINOPHEN 5; 325 MG/1; MG/1
1 TABLET ORAL EVERY 4 HOURS PRN
Qty: 36 TABLET | Refills: 0 | Status: SHIPPED | OUTPATIENT
Start: 2025-06-12

## 2025-06-12 RX ORDER — ONDANSETRON 4 MG/1
4 TABLET, FILM COATED ORAL EVERY 6 HOURS PRN
Qty: 20 TABLET | Refills: 0 | Status: SHIPPED | OUTPATIENT
Start: 2025-06-12

## 2025-06-12 RX ADMIN — GABAPENTIN 300 MG: 300 CAPSULE ORAL at 05:37

## 2025-06-12 RX ADMIN — ACETAMINOPHEN 650 MG: 325 TABLET, FILM COATED ORAL at 18:31

## 2025-06-12 RX ADMIN — OXYCODONE HYDROCHLORIDE 5 MG: 5 TABLET ORAL at 18:31

## 2025-06-12 RX ADMIN — ACETAMINOPHEN 650 MG: 325 TABLET, FILM COATED ORAL at 10:01

## 2025-06-12 RX ADMIN — ONDANSETRON HYDROCHLORIDE 4 MG: 4 TABLET, FILM COATED ORAL at 04:30

## 2025-06-12 RX ADMIN — ASPIRIN 81 MG: 81 TABLET, DELAYED RELEASE ORAL at 09:02

## 2025-06-12 RX ADMIN — OXYCODONE HYDROCHLORIDE 5 MG: 5 TABLET ORAL at 04:30

## 2025-06-12 RX ADMIN — GABAPENTIN 300 MG: 300 CAPSULE ORAL at 13:36

## 2025-06-12 RX ADMIN — CEFADROXIL 500 MG: 500 CAPSULE ORAL at 13:36

## 2025-06-12 RX ADMIN — BISACODYL 5 MG: 5 TABLET, COATED ORAL at 09:02

## 2025-06-12 RX ADMIN — FAMOTIDINE 20 MG: 20 TABLET, FILM COATED ORAL at 09:02

## 2025-06-12 RX ADMIN — ACETAMINOPHEN 650 MG: 325 TABLET, FILM COATED ORAL at 04:30

## 2025-06-12 RX ADMIN — OXYCODONE HYDROCHLORIDE 5 MG: 5 TABLET ORAL at 09:04

## 2025-06-12 ASSESSMENT — COGNITIVE AND FUNCTIONAL STATUS - GENERAL
MOBILITY SCORE: 18
TOILETING: A LITTLE
HELP NEEDED FOR BATHING: A LITTLE
DAILY ACTIVITIY SCORE: 21
CLIMB 3 TO 5 STEPS WITH RAILING: A LITTLE
MOVING TO AND FROM BED TO CHAIR: A LITTLE
MOVING FROM LYING ON BACK TO SITTING ON SIDE OF FLAT BED WITH BEDRAILS: A LITTLE
DRESSING REGULAR LOWER BODY CLOTHING: A LITTLE
TURNING FROM BACK TO SIDE WHILE IN FLAT BAD: A LITTLE
STANDING UP FROM CHAIR USING ARMS: A LITTLE
WALKING IN HOSPITAL ROOM: A LITTLE

## 2025-06-12 ASSESSMENT — PAIN DESCRIPTION - ORIENTATION
ORIENTATION: LEFT
ORIENTATION: LEFT

## 2025-06-12 ASSESSMENT — PAIN DESCRIPTION - DESCRIPTORS
DESCRIPTORS: ACHING
DESCRIPTORS: ACHING;CRUSHING
DESCRIPTORS: SORE

## 2025-06-12 ASSESSMENT — PAIN SCALES - GENERAL
PAINLEVEL_OUTOF10: 7
PAINLEVEL_OUTOF10: 5 - MODERATE PAIN
PAINLEVEL_OUTOF10: 3
PAINLEVEL_OUTOF10: 4
PAINLEVEL_OUTOF10: 7
PAINLEVEL_OUTOF10: 5 - MODERATE PAIN

## 2025-06-12 ASSESSMENT — PAIN - FUNCTIONAL ASSESSMENT
PAIN_FUNCTIONAL_ASSESSMENT: 0-10
PAIN_FUNCTIONAL_ASSESSMENT: UNABLE TO SELF-REPORT
PAIN_FUNCTIONAL_ASSESSMENT: 0-10

## 2025-06-12 ASSESSMENT — PAIN DESCRIPTION - LOCATION
LOCATION: LEG
LOCATION: KNEE

## 2025-06-12 ASSESSMENT — ACTIVITIES OF DAILY LIVING (ADL): HOME_MANAGEMENT_TIME_ENTRY: 45

## 2025-06-12 NOTE — PROGRESS NOTES
Patient: Odessa Thompson  Room/bed: 146/146-A  Admitted on: 6/9/2025    Age: 55 y.o.   Gender: female  Code Status:  Full Code   Admitting Dx: Chronic pain of left knee [M25.562, G89.29]  Osteoarthritis of left knee, unspecified osteoarthritis type [M17.12]    MRN: [id number]  PCP: Deb Raymond MD       Subjective   Seen and examined in her room this AM. Awake and alert. Feels much better. Able to eat. Intermittent dizziness with nausea. No BM. She denies chest pain, breathing difficulties, abdominal pain, N/V/D, fever, or chills.     Objective    Physical Exam   Constitutional: A&O x 3; cooperative with no significant anxiety  Eyes: EOM's intact  HEENT: Normocephalic, Atraumatic. Oral mucosa moist.   Neck: Supple. No JVD, lymphadenopathy.   Lungs: CTAB with fair air movement. Respirations even and unlabored on room air.   Heart: RRR  Abdomen: Soft, non-tender, non-distended, +BS  MS/Extremities: FITZPATRICK x 4 with LLE pain/decreased ROM. No edema. Peripheral pulses intact bilaterally.   Neuro: A&O x3; no focal deficits; gross motor and sensation intact.   Skin: Warm and dry. No rashes or lesions  Psych: Normal affect.      Temp:  [36.6 °C (97.9 °F)-37.1 °C (98.8 °F)] 36.6 °C (97.9 °F)  Heart Rate:  [] 97  Resp:  [16-18] 17  BP: (130-164)/() 154/85    Vitals:    06/09/25 1017   Weight: 66.3 kg (146 lb 2.6 oz)             I/Os    Intake/Output Summary (Last 24 hours) at 6/12/2025 1442  Last data filed at 6/12/2025 1039  Gross per 24 hour   Intake 220 ml   Output --   Net 220 ml       Labs:   Results from last 72 hours   Lab Units 06/12/25  0521 06/11/25  0800 06/10/25  0508   SODIUM mmol/L 135* 136 137   POTASSIUM mmol/L 3.7 3.6 3.7   CHLORIDE mmol/L 100 100 104   CO2 mmol/L 28 27 25   BUN mg/dL 13 9 16   CREATININE mg/dL 0.60 0.61 0.79   GLUCOSE mg/dL 98 114* 94   CALCIUM mg/dL 8.5* 8.5* 8.2*   ANION GAP mmol/L 11 13 12   EGFR mL/min/1.73m*2 >90 >90 90      Results from last 72 hours   Lab Units  06/12/25  0521 06/11/25  0800 06/10/25  0508   WBC AUTO x10*3/uL 10.3 9.2 8.4   HEMOGLOBIN g/dL 11.4* 12.4 11.3*   HEMATOCRIT % 33.6* 35.7* 35.7*   PLATELETS AUTO x10*3/uL 249 249 234      Lab Results   Component Value Date    CALCIUM 8.5 (L) 06/12/2025     Micro/ID:   Susceptibility data from last 90 days.  Collected Specimen Info Organism   05/23/25 Swab from Nares/Axilla/Groin Methicillin Susceptible Staphylococcus aureus (MSSA)       Images:  XR knee left 1-2 views  Narrative: Interpreted By:  Karthik Bryant,   STUDY:  XR KNEE LEFT 1-2 VIEWS; ;  6/9/2025 3:44 pm      INDICATION:  Signs/Symptoms:SP LTKA.          COMPARISON:  05/23/2025      ACCESSION NUMBER(S):  [id number]      ORDERING CLINICIAN:  CONI BISWAS      FINDINGS:  Left knee, two views      Total knee arthroplasty in place. There is a moderate-sized effusion.  Postsurgical changes soft tissues. No periprosthetic fracture or  lucency seen      Impression: No immediate complication after left total knee arthroplasty          MACRO:  None      Signed by: Karthik Bryant 6/9/2025 4:04 PM  Dictation workstation:   [id number]       Meds    Scheduled medications  Scheduled Medications[1]  Continuous medications  Continuous Medications[2]  PRN medications  PRN Medications[3]     Assessment and Plan    Odessa Thompson is a 55 y.o. female with a medical history of DM II, HTN, and OA who presented to [facility] for an elective left TKA by Dr. Nicolas. Consulted for medical management.     Hypertension  -Reports no current BP agents since her bariatric surgery  -Will monitor closely and add agent if needed.  -Continue PRN Hydralazine for SBP>150  --160's in past 24 hours    Left Knee Osteoarthritis  -S/P left TKA by Dr. Nicolas on 6/9/25  -Incisional care, antibiotics, activity restrictions per orthopedics surgery.  -PT/OT to follow. WBAT.   -Medicate for pain  -Maintain bowel regimen  -Advised IS use, mobilization.   -Monitor H&H for ABLA. Hgb stable at  11.4.   -DVT prophylaxis per surgery: ASA 81mg BID.   -Afebrile. No leukocytosis.     Diabetes Mellitus Type II - Diet Controlled  -HgbA1c 5.2  -Will monitor and add Insulin Sliding Scale if needed  -Glucose 98 on AM BMP    Depression/Anxiety  -Less anxious on exam  -On Lexapro    GERD  -H/O bariatric surgery  -Requesting no PPI; only Pepcid  -Avoid NSAID's    Fluids/Electrolytes/Nutrition  -Laboratory data reviewed: BMP/CBC.   -Electrolytes stable.   -No nutritional concerns at this time.      Disposition  -Plan of care discussed with medicine, Dr. Shukla, orthopedics APRN.  -Discharge per orthopedics.  Medically stable. Will sign off.       Joanna Pineda, APRN-CNP        [1] acetaminophen, 650 mg, oral, q6h BELGICA  aspirin, 81 mg, oral, BID  bisacodyl, 5 mg, oral, BID  cefadroxil, 500 mg, oral, q12h BELGICA  escitalopram, 20 mg, oral, Daily  famotidine, 20 mg, oral, BID  gabapentin, 300 mg, oral, q8h BELGICA  polyethylene glycol, 17 g, oral, Daily     [2] oxygen, 2 L/min, Last Rate: Stopped (06/09/25 2013)     [3] PRN medications: hydrALAZINE, morphine, naloxone, ondansetron **OR** ondansetron, oxyCODONE, oxyCODONE

## 2025-06-12 NOTE — PROGRESS NOTES
06/12/25 1000   Discharge Planning   Assistance Needed [facility] are both able to accept patient. [facility]'s preference is [facility]. Pre-cert cannot be initiated until all DC orders are completed so 7000 can be completed in [facility] prior to pre-cert can be initiated.      Update 6/12/25 12:00 PASSR completed in [facility] by D[facility] and Pre-cert was initiated by  direct pre-cert team. auth pending [id number]

## 2025-06-12 NOTE — CARE PLAN
The patient's goals for the shift include      The clinical goals for the shift include monitor nausea/vomiting/pain, encourage mobility      Problem: Pain - Adult  Goal: Verbalizes/displays adequate comfort level or baseline comfort level  Outcome: Progressing     Problem: Safety - Adult  Goal: Free from fall injury  Outcome: Progressing     Problem: Discharge Planning  Goal: Discharge to home or other facility with appropriate resources  Outcome: Progressing     Problem: Chronic Conditions and Co-morbidities  Goal: Patient's chronic conditions and co-morbidity symptoms are monitored and maintained or improved  Outcome: Progressing     Problem: Nutrition  Goal: Nutrient intake appropriate for maintaining nutritional needs  Outcome: Progressing

## 2025-06-12 NOTE — DISCHARGE SUMMARY
Discharge Diagnosis  Chronic pain of left knee    Issues Requiring Follow-Up  Surgery follow up    Test Results Pending At Discharge  Pending Labs       Order Current Status    Surgical Pathology Exam In process            Hospital Course   Pt was taken to OR 6/9/25 with Dr. Nicolas for left knee total arthroplasty. There were no surgical complications and pt was then transferred to PACU and then nursing floor.   Pt's hospital stay was complicated by increased pain. This was managed with pain medication and did improve. She worked with PT and OT.  She is tolerating PO. Her pain medication causes nausea but no vomiting and medicine helps the nausea.   She is passing gas. She is urinating without diff.   No CP or SOB sx.   Her labs are stable.   She is stable for discharge to SNF.      Visit Vitals  /85 (BP Location: Left arm, Patient Position: Sitting)   Pulse 97   Temp 36.6 °C (97.9 °F) (Temporal)   Resp 17     Vitals:    06/09/25 1017   Weight: 66.3 kg (146 lb 2.6 oz)       Immunization History   Administered Date(s) Administered    Moderna SARS-CoV-2 Vaccination 02/24/2021, 03/20/2021, 12/04/2021       Results        Pertinent Physical Exam At Time of Discharge  Physical Exam  Vitals reviewed.   Constitutional:       General: She is not in acute distress.     Appearance: Normal appearance. She is not ill-appearing, toxic-appearing or diaphoretic.   HENT:      Head: Normocephalic and atraumatic.      Mouth/Throat:      Mouth: Mucous membranes are moist.   Eyes:      General: No scleral icterus.        Right eye: No discharge.         Left eye: No discharge.      Conjunctiva/sclera: Conjunctivae normal.   Cardiovascular:      Rate and Rhythm: Normal rate and regular rhythm.      Pulses: Normal pulses.      Heart sounds: Normal heart sounds. No murmur heard.     No friction rub. No gallop.   Pulmonary:      Effort: Pulmonary effort is normal. No respiratory distress.      Breath sounds: Normal breath sounds. No  stridor. No wheezing, rhonchi or rales.   Chest:      Chest wall: No tenderness.   Abdominal:      General: Bowel sounds are normal. There is no distension.      Palpations: Abdomen is soft. There is no mass.      Tenderness: There is no abdominal tenderness. There is no guarding or rebound.      Hernia: No hernia is present.   Musculoskeletal:         General: Swelling and tenderness present.      Right lower leg: No edema.      Left lower leg: No edema.      Comments: Left knee silver surgical dressing in place. No strikethrough. Slight swelling around site. No ecchymosis. No numbness or tingling. 4/5 strength LLE. Calf supple.      Skin:     General: Skin is warm and dry.      Capillary Refill: Capillary refill takes less than 2 seconds.   Neurological:      Mental Status: She is alert and oriented to person, place, and time.   Psychiatric:         Attention and Perception: Attention normal.         Mood and Affect: Mood is anxious. Affect is not inappropriate.         Behavior: Behavior is cooperative.         Home Medications     Medication List      START taking these medications     aspirin 81 mg chewable tablet; Chew 1 tablet (81 mg) 2 times a day for   28 days.   cefadroxil 500 mg capsule; Commonly known as: Duricef; Take 1 capsule   (500 mg) by mouth 2 times a day for 7 days.   docusate sodium 100 mg capsule; Commonly known as: Colace; Take 1   capsule (100 mg) by mouth 2 times a day for 10 days.   gabapentin 300 mg capsule; Commonly known as: Neurontin; Take 1 capsule   (300 mg) by mouth every 8 hours for 10 days.   naproxen 500 mg tablet; Commonly known as: Naprosyn; Take 1 tablet (500   mg) by mouth 2 times daily (morning and late afternoon).   ondansetron 4 mg tablet; Commonly known as: Zofran; Take 1 tablet (4 mg)   by mouth every 6 hours if needed for nausea.   oxyCODONE-acetaminophen 5-325 mg tablet; Commonly known as: Percocet;   Take 1 tablet by mouth every 4 hours if needed for severe pain (7 -  10)   for up to 6 days.   polyethylene glycol 17 gram/dose powder; Commonly known as: Glycolax,   Miralax; Mix 17 g of powder and drink once daily.     CONTINUE taking these medications     escitalopram 20 mg tablet; Commonly known as: Lexapro   famotidine 40 mg tablet; Commonly known as: Pepcid; Take 1 tablet (40   mg) by mouth 2 times a day as needed for heartburn.   omeprazole 40 mg DR capsule; Commonly known as: PriLOSEC; Take 1 capsule   (40 mg) by mouth once daily in the morning. Take before meals. Do not   crush or chew.   rizatriptan 10 mg tablet; Commonly known as: Maxalt     STOP taking these medications     acetaminophen 650 mg ER tablet; Commonly known as: Tylenol 8 HOUR   oxyCODONE 5 mg immediate release tablet; Commonly known as: Roxicodone       Outpatient Follow-Up  Future Appointments   Date Time Provider Department Center   6/27/2025 11:30 AM Joaquim Goodwin PA-C [id number]   7/17/2025 10:30 AM Deb Raymond MD [email or web address]       Rose Mary Hyatt, APRN-CNP

## 2025-06-12 NOTE — PROGRESS NOTES
Occupational Therapy    Occupational Therapy Treatment    Name: Odessa Thompson  MRN: [id number]  Department: [address]  Room: [address] [place] [address]  Date: 06/12/25  Time Calculation  Start Time: 0750  Stop Time: 0851  Time Calculation (min): 61 min    Assessment:  OT Assessment: Pt is a 56 yo F referred to occupational therapy for impaired self-care and functional mobility 2/2 hospitalization for chronic pain of L knee, s/p L TKA. Pt demonstrates impaired though improving ADLs, and functional mobility this date. Pt required CGA for some aspects of transfers and functional mobility, Modified Independent for toileting and Modified Independent to Supervsion for LE dressing. Pt has decreased support at home and would benefit from continued OT services at home. Following discussion with  evaluating OTR and completion of Modified Barthel index with results provided to OT, consensus is to now recommend LOW intensity level of OT intervention in the discharge setting to increase functional independence with ADLs and mobility.  Prognosis: Good  Barriers to Discharge Home: Caregiver assistance, Physical needs  Caregiver Assistance: Caregiver assistance needed per identified barriers - however, level of patient's required assistance exceeds assistance available at home  Physical Needs: Stair navigation into home limited by function/safety, Ambulating household distances limited by function/safety, Intermittent mobility assistance needed, Intermittent ADL assistance needed, High falls risk due to function or environment  Evaluation/Treatment Tolerance: Patient tolerated treatment well  Medical Staff Made Aware: Yes  End of Session Communication: Bedside nurse (Ortho NP.)  End of Session Patient Position: Up in chair, Alarm off, not on at start of session (Discussed need for chair alarm pad to be applied with nurse at end of session d/t falls risk score at end of session and lack of same. Nurse in agreement.)  Plan:  Treatment Interventions:  ADL retraining, Functional transfer training, Endurance training, Patient/family training, Equipment evaluation/education, Compensatory technique education  OT Frequency: 3 times per week  OT Discharge Recommendations: Low intensity level of continued care  Equipment Recommended upon Discharge: Wheeled walker (owns)  OT Recommended Transfer Status: Stand by assist, Assist of 1  OT - OK to Discharge: Yes    Subjective     OT Visit Info:  OT Received On: 06/12/25  General:  General  Reason for Referral: Pt is a 56 yo F referred to occupational therapy for impaired self-care and functional mobility 2/2 hospitalization for chronic pain of L knee. Pt s/p L TKA  Referred By: Lowell Nicolas  Past Medical History Relevant to Rehab: diabetes mellitus type 2 (diet-controlled since bariatric surgery and significant weight loss), hypertension which is also diet-controlled, migraine, thrombocytosis, obstructive sleep apnea  Prior to Session Communication: Bedside nurse  Patient Position Received: Bed, 3 rail up, Alarm off, not on at start of session  Preferred Learning Style: verbal, visual  General Comment: Pt pleasant and cooperative easily engaged in treatment.  Precautions:  LE Weight Bearing Status: Weight Bearing as Tolerated  Medical Precautions: Fall precautions  Post-Surgical Precautions: Left total knee precautions  Precautions Comment: Mil    Pain Assessment:  Pain Assessment  Pain Assessment: 0-10  0-10 (Numeric) Pain Score: 7  Pain Type: Acute pain  Pain Location: Knee  Pain Orientation: Left  Pain Descriptors: Sore  Pain Frequency: Constant/continuous  Clinical Progression: Gradually improving  Pain Interventions: Repositioned, Elevated  Response to Interventions: Resting quietly, Decrease in pain, Content/relaxed (Pt awaiting pain medication and cold pack at end of session, easily conversational and with L LE elevated beginning breakfast.)    Objective   Cognition:  Overall Cognitive Status: Within Functional  Limits  Arousal/Alertness: Appropriate responses to stimuli  Orientation Level: Oriented X4  Activities of Daily Living: LE Dressing  LE Dressing: Yes  LE Dressing Adaptive Equipment:  (Plastic bag for compression stockings.)  Pants Level of Assistance: Modified independent (long sitting, donned over shorts)  Sock Level of Assistance: Modified independent (long sitting)  Compression Hose Level of Assistance: Setup, Close supervision (Long sitting using plastic bag for friction reduction in long sitting and increased time on task.)  LE Dressing Where Assessed: Bed level  LE Dressing Comments: Pt able to demonstrate LB dressing tasks with set-up and verbal cues, with greater level of supervision for new learning doffng and donning ROSALINE stockings using adaptive technique and less than 10% physical assist following education.    Toileting  Toileting Level of Assistance: Setup, Modified independent  Where Assessed: Toilet (using single toilet mounted bar for unilateral support in clothing management components.)  Toileting Comments: Discussed substitution of bedside commode with bucket removed for home use.     Bed Mobility/Transfers: Bed Mobility  Bed Mobility: Yes  Bed Mobility 1  Bed Mobility 1: Supine to sitting  Level of Assistance 1: Distant supervision  Bed Mobility Comments 1: Verbal cues for effective hand placement and mechanics.    Transfers  Transfer: Yes  Transfer 1  Transfer From 1: Bed to, Toilet to, Chair with arms to, Chair without arms to  Transfer to 1: Stand  Technique 1: Sit to stand, Stand to sit  Transfer Device 1: Walker  Transfer Level of Assistance 1: Contact guard (SBA<>CGA with improvement noted with repetition and increased confidence in tasks. Reminders infrequently needed for safe hand placement in transition to/from device.)    Functional Mobility:  Functional Mobility  Functional Mobility Performed: Yes  Functional Mobility 1  Surface 1: Level tile  Device 1: Rolling walker  Assistance 1:  Contact guard, Minimal verbal cues  Comments 1: bed>bathroom toilet>bedside recliner. Min verbal cues for device management in close quarters backing and turning in bathroom.    Outcome Measures:  [facility] Daily Activity  Putting on and taking off regular lower body clothing: A little  Bathing (including washing, rinsing, drying): A little  Putting on and taking off regular upper body clothing: None  Toileting, which includes using toilet, bedpan or urinal: A little  Taking care of personal grooming such as brushing teeth: None  Eating Meals: None  Daily Activity - Total Score: 21    Education Documentation  Body Mechanics, taught by SYEDA Reveles at 6/12/2025  9:59 AM.  Learner: Patient  Readiness: Acceptance  Method: Explanation, Demonstration  Response: Verbalizes Understanding, Demonstrated Understanding, Needs Reinforcement  Comment: Pt educated on post. TKR precautions and safety including device safety and effective body mechanics, use of adaptive tools for self-care, and non-medication pain management. Pt receptive to instruction in session. Continued reinforcement is indicated.    Precautions, taught by SYEDA Reveles at 6/12/2025  9:59 AM.  Learner: Patient  Readiness: Acceptance  Method: Explanation, Demonstration  Response: Verbalizes Understanding, Demonstrated Understanding, Needs Reinforcement  Comment: Pt educated on post. TKR precautions and safety including device safety and effective body mechanics, use of adaptive tools for self-care, and non-medication pain management. Pt receptive to instruction in session. Continued reinforcement is indicated.    ADL Training, taught by SYEDA Reveles at 6/12/2025  9:59 AM.  Learner: Patient  Readiness: Acceptance  Method: Explanation, Demonstration  Response: Verbalizes Understanding, Demonstrated Understanding, Needs Reinforcement  Comment: Pt educated on post. TKR precautions and safety including device safety and effective body mechanics, use of  adaptive tools for self-care, and non-medication pain management. Pt receptive to instruction in session. Continued reinforcement is indicated.    Goals:  Encounter Problems       Encounter Problems (Active)       ADLs       Patient will perform UB and LB bathing with stand by assist level of assistance and PRN bathroom equipment.       Start:  06/11/25    Expected End:  06/25/25            Patient with complete lower body dressing with stand by assist level of assistance donning and doffing all LE clothes  with PRN adaptive equipment while edge of bed  and standing (Progressing)       Start:  06/11/25    Expected End:  06/25/25            Patient will complete toileting including hygiene clothing management/hygiene with supervision level of assistance and PRN bathroom equipment. (Progressing)       Start:  06/11/25    Expected End:  06/25/25               BALANCE       Pt will maintain dynamic standing balance during ADL task with supervision level of assistance in order to demonstrate decreased risk of falling and improved postural control. (Progressing)       Start:  06/11/25    Expected End:  06/25/25               MOBILITY       Patient will perform Functional mobility mod  Household distances/Community Distances with supervision level of assistance and least restrictive device in order to improve safety and functional mobility. (Progressing)       Start:  06/11/25    Expected End:  06/25/25

## 2025-06-12 NOTE — PROGRESS NOTES
Physical Therapy    Physical Therapy Treatment    Patient Name: Odessa Thompson  MRN: [id number]  Department: [address]  Room: [address] [place] [address]  Today's Date: 6/12/2025  Time Calculation  Start Time: 0922  Stop Time: 0946  Time Calculation (min): 24 min         Assessment/Plan   PT Assessment  PT Assessment Results: Decreased strength, Decreased endurance, Impaired balance, Decreased mobility  Rehab Prognosis: Excellent  Barriers to Discharge Home: Physical needs, Caregiver assistance  Caregiver Assistance: Caregiver assistance needed per identified barriers - however, level of patient's required assistance exceeds assistance available at home  Physical Needs: Stair navigation into home limited by function/safety, Intermittent mobility assistance needed, Intermittent ADL assistance needed, High falls risk due to function or environment  Evaluation/Treatment Tolerance: Patient tolerated treatment well  Medical Staff Made Aware: Yes  Strengths: Ability to acquire knowledge, Insight into problems  Barriers to Participation: Comorbidities, Support of Caregivers  End of Session Communication: Bedside nurse, PCT/NA/CTA (Encouraged short distance ambulation hourly during the day)  Assessment Comment: Patient continues with elevated levels of pain. Patient continues with poor PO intake. Patient with limited tolerance for mobility with use of 2WW, has two sons at home who can be somewhat helpful per patient. Due to continued high levels of pain, limited mobility and limited home going support. Rec MOD intensity PT intervention.  End of Session Patient Position: Up in chair, Alarm off, not on at start of session (No alarm necessary per staff, all needs within reach)  PT Plan  Inpatient/Swing Bed or Outpatient: Inpatient  PT Plan  Treatment/Interventions: Bed mobility, Transfer training, Gait training, Stair training, Balance training, Strengthening, Endurance training, Range of motion, Therapeutic exercise  PT Plan: Ongoing PT  PT  Frequency: Daily  PT Discharge Recommendations: Moderate intensity level of continued care  Equipment Recommended upon Discharge: Wheeled walker (owns)  PT Recommended Transfer Status: Assist x1  PT - OK to Discharge: Yes (per PT POC)    PT Visit Info:  PT Received On: 06/12/25  Response to Previous Treatment: Patient with no complaints from previous session.     General Visit Information:   General  Reason for Referral: Impaired functional mobility; L TKA  Referred By: Lowell Nicolas  Past Medical History Relevant to Rehab: diabetes mellitus type 2 (diet-controlled since bariatric surgery and significant weight loss), hypertension which is also diet-controlled, migraine, thrombocytosis, obstructive sleep apnea  Family/Caregiver Present: No  Prior to Session Communication: Bedside nurse  Patient Position Received: Up in chair, Alarm off, not on at start of session  General Comment: Patient pleasant, cooperative and agreeable to tx    Subjective   Precautions:  Precautions  LE Weight Bearing Status: Weight Bearing as Tolerated  Medical Precautions: Fall precautions  Post-Surgical Precautions: Left total knee precautions     Date/Time Vitals Session Patient Position Pulse Resp SpO2 BP MAP (mmHg)    06/12/25 0904 --  --  97  17  97 %  154/85  108                 Objective   Pain:  Pain Assessment  Pain Assessment: 0-10  0-10 (Numeric) Pain Score: 7  Pain Type: Surgical pain  Pain Location: Knee  Pain Orientation: Left  Pain Interventions: Repositioned, Ambulation/increased activity (RN aware of pain, received meds prior to tx. Patient has been started on gabapentin)  Cognition:  Cognition  Overall Cognitive Status: Within Functional Limits  Orientation Level: Oriented X4  Coordination:  Movements are Fluid and Coordinated: Yes  Postural Control:  Postural Control  Postural Control: Within Functional Limits  Static Sitting Balance  Static Sitting-Balance Support: No upper extremity supported, Feet supported  Static  Sitting-Level of Assistance: Independent  Dynamic Sitting Balance  Dynamic Sitting-Balance Support: No upper extremity supported, Feet supported  Dynamic Sitting-Level of Assistance: Independent  Dynamic Sitting-Balance: Forward lean  Static Standing Balance  Static Standing-Balance Support: Bilateral upper extremity supported  Static Standing-Level of Assistance: Close supervision  Dynamic Standing Balance  Dynamic Standing-Balance Support: Bilateral upper extremity supported  Dynamic Standing-Level of Assistance: Close supervision  Dynamic Standing-Balance: Turning    Activity Tolerance:  Activity Tolerance  Endurance: Tolerates 30 min exercise with multiple rests  Treatments:  Therapeutic Exercise  Therapeutic Exercise Performed: Yes  Therapeutic Exercise Activity 1:  Patient encouraged to complete supine ther ex 3x/day.     Completed: ankle pumps, quad sets, heel slides  AA: hip abd/add, SAQ   Patient advised home care will advance ther ex as tolerated   Encouraged to take short duration ambulation bouts hourly during waking hours with the use of 2WW, focusing on gait pattern.   Educated on the importance of avoiding remaining in one position for extended period of time. Encouraged pain and edema control with use of ice, elevation and activity pacing. Patient verbalized understanding.   Reiterated no pillows/towels etc under the knee   Reviewed TKA precautions         Bed Mobility  Bed Mobility: Yes  Bed Mobility 1  Bed Mobility 1: Supine to sitting, Sitting to supine  Level of Assistance 1: Close supervision  Bed Mobility Comments 1: Patient uses B UE to assist L LE    Ambulation/Gait Training  Ambulation/Gait Training Performed: Yes  Ambulation/Gait Training 1  Surface 1: Level tile  Device 1: Rolling walker  Assistance 1: Close supervision  Quality of Gait 1:  (decreaesd step length, increased demand on B UE, decreased stance phase R)  Comments/Distance (ft) 1: 5'; 25'  Transfers  Transfer: Yes  Transfer  1  Transfer From 1: Sit to, Stand to  Transfer to 1: Sit, Stand  Technique 1: Sit to stand, Stand to sit  Transfer Device 1: Walker  Transfer Level of Assistance 1: Close supervision  Trials/Comments 1: Multiple surface types including recliner and EOB         Outcome Measures:  [facility] Basic Mobility  Turning from your back to your side while in a flat bed without using bedrails: A little  Moving from lying on your back to sitting on the side of a flat bed without using bedrails: A little  Moving to and from bed to chair (including a wheelchair): A little  Standing up from a chair using your arms (e.g. wheelchair or bedside chair): A little  To walk in hospital room: A little  Climbing 3-5 steps with railing: A little  Basic Mobility - Total Score: 18    Gait speed = .15 ft/sec indicating high risk of falls     Education Documentation  Handouts, taught by Madhuri Alvarado PT at 6/12/2025 10:11 AM.  Learner: Patient  Readiness: Acceptance  Method: Explanation  Response: Verbalizes Understanding  Comment: Importance of staff assist for mobility, continued use of 2WW, ice and elevation for pain control, HEP 3x/day, short distance hourly ambulation with walker and staff    Precautions, taught by Madhuri Alvarado PT at 6/12/2025 10:11 AM.  Learner: Patient  Readiness: Acceptance  Method: Explanation  Response: Verbalizes Understanding  Comment: Importance of staff assist for mobility, continued use of 2WW, ice and elevation for pain control, HEP 3x/day, short distance hourly ambulation with walker and staff    Body Mechanics, taught by Madhuri Alvarado PT at 6/12/2025 10:11 AM.  Learner: Patient  Readiness: Acceptance  Method: Explanation  Response: Verbalizes Understanding  Comment: Importance of staff assist for mobility, continued use of 2WW, ice and elevation for pain control, HEP 3x/day, short distance hourly ambulation with walker and staff    Home Exercise Program, taught by Madhuri Alvarado PT at 6/12/2025 10:11  AM.  Learner: Patient  Readiness: Acceptance  Method: Explanation  Response: Verbalizes Understanding  Comment: Importance of staff assist for mobility, continued use of 2WW, ice and elevation for pain control, HEP 3x/day, short distance hourly ambulation with walker and staff    Mobility Training, taught by Madhuri Alvarado PT at 6/12/2025 10:11 AM.  Learner: Patient  Readiness: Acceptance  Method: Explanation  Response: Verbalizes Understanding  Comment: Importance of staff assist for mobility, continued use of 2WW, ice and elevation for pain control, HEP 3x/day, short distance hourly ambulation with walker and staff    Education Comments  No comments found.        OP EDUCATION:       Encounter Problems       Encounter Problems (Active)       Mobility       Patient will demonstrate good understanding of bed mobility, transfers, ambulation, stair negotiation and HEP for safe home going.   (Progressing)       Start:  06/10/25    Expected End:  06/24/25               Pain - Adult

## 2025-06-13 ENCOUNTER — NURSING HOME VISIT (OUTPATIENT)
Dept: POST ACUTE CARE | Facility: EXTERNAL LOCATION | Age: 55
End: 2025-06-13
Payer: COMMERCIAL

## 2025-06-13 ENCOUNTER — DOCUMENTATION (OUTPATIENT)
Dept: HOME HEALTH SERVICES | Facility: HOME HEALTH | Age: 55
End: 2025-06-13
Payer: COMMERCIAL

## 2025-06-13 DIAGNOSIS — G47.33 OBSTRUCTIVE SLEEP APNEA SYNDROME: ICD-10-CM

## 2025-06-13 DIAGNOSIS — M17.12 OSTEOARTHRITIS OF LEFT KNEE, UNSPECIFIED OSTEOARTHRITIS TYPE: Primary | ICD-10-CM

## 2025-06-13 DIAGNOSIS — I10 ESSENTIAL HYPERTENSION: ICD-10-CM

## 2025-06-13 DIAGNOSIS — E78.2 MIXED HYPERLIPIDEMIA: ICD-10-CM

## 2025-06-13 DIAGNOSIS — F41.1 GENERALIZED ANXIETY DISORDER: ICD-10-CM

## 2025-06-13 PROCEDURE — 99305 1ST NF CARE MODERATE MDM 35: CPT | Performed by: FAMILY MEDICINE

## 2025-06-13 NOTE — LETTER
Patient: Odessa Thompson  : 1970    Encounter Date: 2025    Subjective  Patient ID: Odessa Thompson is a 55 y.o. female who is acute skilled care being seen and evaluated for multiple medical problems.    HPI 54yo here for rehab s/p left TKR. Doing fine. Labs pending. Pain controlled. Bowels stable. No perioperative issues with chest pain, shortness of breath, fevers or dysuria.     Review of Systems   Constitutional:  Negative for chills, fatigue and fever.   HENT:  Negative for congestion and sore throat.    Eyes:  Negative for visual disturbance.   Respiratory:  Negative for cough and shortness of breath.    Cardiovascular:  Negative for chest pain.   Gastrointestinal:  Negative for abdominal pain, constipation, diarrhea, nausea and vomiting.   Genitourinary:  Negative for dysuria.   Musculoskeletal:  Positive for gait problem and myalgias.   Neurological:  Negative for headaches.       Objective  There were no vitals taken for this visit.    Physical Exam  Vitals reviewed: 148/87 97.6 76 wt pending.   Constitutional:       General: She is not in acute distress.     Appearance: Normal appearance.   HENT:      Mouth/Throat:      Mouth: Mucous membranes are moist.      Pharynx: Oropharynx is clear.   Eyes:      General:         Right eye: No discharge.         Left eye: No discharge.      Extraocular Movements: Extraocular movements intact.      Conjunctiva/sclera: Conjunctivae normal.      Pupils: Pupils are equal, round, and reactive to light.   Cardiovascular:      Rate and Rhythm: Normal rate and regular rhythm.      Heart sounds: Normal heart sounds. No murmur heard.  Pulmonary:      Effort: Pulmonary effort is normal.      Breath sounds: Normal breath sounds. No wheezing or rhonchi.   Abdominal:      General: There is no distension.      Palpations: Abdomen is soft.      Tenderness: There is no abdominal tenderness.   Musculoskeletal:         General: Swelling present. No tenderness.    Lymphadenopathy:      Cervical: No cervical adenopathy.   Skin:     Coloration: Skin is not jaundiced.   Neurological:      General: No focal deficit present.      Mental Status: She is alert.      Cranial Nerves: No cranial nerve deficit.      Sensory: No sensory deficit.      Gait: Gait abnormal.   Psychiatric:         Mood and Affect: Mood normal.         Assessment/Plan  Problem List Items Addressed This Visit           ICD-10-CM    Generalized anxiety disorder F41.1    Essential hypertension I10    Mixed hyperlipidemia E78.2    Obstructive sleep apnea syndrome G47.33    Osteoarthritis of left knee, unspecified osteoarthritis type - Primary M17.12   Labs pending  Continue therapies  Bp high but monitor-may be anxiety     Goals    None         Electronically Signed By: Tamika Erazo MD   6/14/25  1:58 PM

## 2025-06-13 NOTE — HH CARE COORDINATION
Home Care received a referral for Physical Therapy. Unfortunately, we are unable to accept and process the referral at this time.    Reason:  Patient is discharging to a Post-Acute Care Facility    Patients, please reach out to the referring provider or your PCP to assist in obtaining an alternative home care agency and/or guidance to meet your needs.    Providers, please reach out to  Home Care at [phone] with any questions regarding the declined referral.

## 2025-06-14 ASSESSMENT — ENCOUNTER SYMPTOMS
DIARRHEA: 0
CHILLS: 0
DYSURIA: 0
NAUSEA: 0
COUGH: 0
MYALGIAS: 1
SORE THROAT: 0
VOMITING: 0
FATIGUE: 0
CONSTIPATION: 0
ABDOMINAL PAIN: 0
SHORTNESS OF BREATH: 0
HEADACHES: 0
FEVER: 0

## 2025-06-14 NOTE — PROGRESS NOTES
Subjective   Patient ID: Odessa Thompson is a 55 y.o. female who is acute skilled care being seen and evaluated for multiple medical problems.    HPI 54yo here for rehab s/p left TKR. Doing fine. Labs pending. Pain controlled. Bowels stable. No perioperative issues with chest pain, shortness of breath, fevers or dysuria.     Review of Systems   Constitutional:  Negative for chills, fatigue and fever.   HENT:  Negative for congestion and sore throat.    Eyes:  Negative for visual disturbance.   Respiratory:  Negative for cough and shortness of breath.    Cardiovascular:  Negative for chest pain.   Gastrointestinal:  Negative for abdominal pain, constipation, diarrhea, nausea and vomiting.   Genitourinary:  Negative for dysuria.   Musculoskeletal:  Positive for gait problem and myalgias.   Neurological:  Negative for headaches.       Objective   There were no vitals taken for this visit.    Physical Exam  Vitals reviewed: 148/87 97.6 76 wt pending.   Constitutional:       General: She is not in acute distress.     Appearance: Normal appearance.   HENT:      Mouth/Throat:      Mouth: Mucous membranes are moist.      Pharynx: Oropharynx is clear.   Eyes:      General:         Right eye: No discharge.         Left eye: No discharge.      Extraocular Movements: Extraocular movements intact.      Conjunctiva/sclera: Conjunctivae normal.      Pupils: Pupils are equal, round, and reactive to light.   Cardiovascular:      Rate and Rhythm: Normal rate and regular rhythm.      Heart sounds: Normal heart sounds. No murmur heard.  Pulmonary:      Effort: Pulmonary effort is normal.      Breath sounds: Normal breath sounds. No wheezing or rhonchi.   Abdominal:      General: There is no distension.      Palpations: Abdomen is soft.      Tenderness: There is no abdominal tenderness.   Musculoskeletal:         General: Swelling present. No tenderness.   Lymphadenopathy:      Cervical: No cervical adenopathy.   Skin:      Coloration: Skin is not jaundiced.   Neurological:      General: No focal deficit present.      Mental Status: She is alert.      Cranial Nerves: No cranial nerve deficit.      Sensory: No sensory deficit.      Gait: Gait abnormal.   Psychiatric:         Mood and Affect: Mood normal.         Assessment/Plan   Problem List Items Addressed This Visit           ICD-10-CM    Generalized anxiety disorder F41.1    Essential hypertension I10    Mixed hyperlipidemia E78.2    Obstructive sleep apnea syndrome G47.33    Osteoarthritis of left knee, unspecified osteoarthritis type - Primary M17.12   Labs pending  Continue therapies  Bp high but monitor-may be anxiety     Goals    None

## 2025-06-17 ENCOUNTER — NURSING HOME VISIT (OUTPATIENT)
Dept: POST ACUTE CARE | Facility: EXTERNAL LOCATION | Age: 55
End: 2025-06-17
Payer: COMMERCIAL

## 2025-06-17 VITALS
BODY MASS INDEX: 29.26 KG/M2 | DIASTOLIC BLOOD PRESSURE: 82 MMHG | OXYGEN SATURATION: 96 % | HEART RATE: 86 BPM | RESPIRATION RATE: 16 BRPM | TEMPERATURE: 97.6 F | SYSTOLIC BLOOD PRESSURE: 138 MMHG | WEIGHT: 149.8 LBS

## 2025-06-17 DIAGNOSIS — Z98.84 HISTORY OF GASTRIC BYPASS: ICD-10-CM

## 2025-06-17 DIAGNOSIS — F41.1 GENERALIZED ANXIETY DISORDER: ICD-10-CM

## 2025-06-17 DIAGNOSIS — M17.12 OSTEOARTHRITIS OF LEFT KNEE, UNSPECIFIED OSTEOARTHRITIS TYPE: Primary | ICD-10-CM

## 2025-06-17 PROCEDURE — 99309 SBSQ NF CARE MODERATE MDM 30: CPT | Performed by: NURSE PRACTITIONER

## 2025-06-17 ASSESSMENT — ENCOUNTER SYMPTOMS: ARTHRALGIAS: 1

## 2025-06-17 NOTE — ANESTHESIA POSTPROCEDURE EVALUATION
Patient: Odessa Thompson    Procedure Summary       Date: 06/09/25 Room / Location: [facility] 03 / [facility]    Anesthesia Start: 1216 Anesthesia Stop: 1427    Procedure: KNEE REPLACEMENT TOTAL HYBRID UNILAT (Left: Knee) Diagnosis:       Chronic pain of left knee      (Chronic pain of left knee [M25.562, G89.29])    Surgeons: Lowell Nicolas MD Responsible Provider: Selvin Hernandez DO    Anesthesia Type: MAC, spinal, regional ASA Status: 2            Anesthesia Type: MAC, spinal, regional    Vitals Value Taken Time   /58 06/09/25 19:07   Temp 36.2 °C (97.2 °F) 06/09/25 14:22   Pulse 61 06/09/25 19:07   Resp 14 06/09/25 19:07   SpO2 100 % 06/09/25 18:37       Anesthesia Post Evaluation    Patient location during evaluation: PACU  Patient participation: complete - patient participated  Level of consciousness: awake and alert  Pain management: adequate  Airway patency: patent  Cardiovascular status: acceptable  Respiratory status: acceptable  Hydration status: acceptable  Postoperative Nausea and Vomiting: none        There were no known notable events for this encounter.

## 2025-06-17 NOTE — PROGRESS NOTES
Subjective   Patient ID: Odessa Thompson is a 55 y.o. female who presents for Knee Pain.    Asked to see patient who states that she was not taking lexapro prior to hospitalization. She is asking to stop this. She states that she was taking it as needed but hasn't taken it in a long time. She states that she has been doing well with therapy. She has percocet ordered as needed until 6/18/25. She has been taking it at night for pain. Discussed continuing this until next week but that she will not discharge from facility with this medication.         Review of Systems   Musculoskeletal:  Positive for arthralgias and gait problem.   All other systems reviewed and are negative.      Objective   /82   Pulse 86   Temp 36.4 °C (97.6 °F)   Resp 16   Wt 67.9 kg (149 lb 12.8 oz)   SpO2 96%   BMI 29.26 kg/m²     Physical Exam  Constitutional:       General: She is not in acute distress.     Appearance: Normal appearance. She is not ill-appearing.   HENT:      Mouth/Throat:      Mouth: Mucous membranes are moist.   Pulmonary:      Effort: Pulmonary effort is normal.   Abdominal:      General: There is no distension.   Musculoskeletal:      Cervical back: Neck supple.      Comments: ROSALINE hose on BLE, seen ambulating with walker without difficulty   Skin:     General: Skin is warm and dry.      Comments: Left knee with dressing intact   Neurological:      Mental Status: She is alert and oriented to person, place, and time.   Psychiatric:         Mood and Affect: Mood normal.         Assessment/Plan   Diagnoses and all orders for this visit:  Osteoarthritis of left knee, unspecified osteoarthritis type  Comments:  PT/OT, extend perocet for 1 more week, cont with naproxen  Generalized anxiety disorder  Comments:  stop lexapro  History of gastric bypass  Comments:  dietcian to see for preferences

## 2025-06-17 NOTE — LETTER
Patient: Odessa Thompson  : 1970    Encounter Date: 2025    Subjective  Patient ID: Odessa Thompson is a 55 y.o. female who presents for Knee Pain.    Asked to see patient who states that she was not taking lexapro prior to hospitalization. She is asking to stop this. She states that she was taking it as needed but hasn't taken it in a long time. She states that she has been doing well with therapy. She has percocet ordered as needed until 25. She has been taking it at night for pain. Discussed continuing this until next week but that she will not discharge from facility with this medication.         Review of Systems   Musculoskeletal:  Positive for arthralgias and gait problem.   All other systems reviewed and are negative.      Objective  /82   Pulse 86   Temp 36.4 °C (97.6 °F)   Resp 16   Wt 67.9 kg (149 lb 12.8 oz)   SpO2 96%   BMI 29.26 kg/m²     Physical Exam  Constitutional:       General: She is not in acute distress.     Appearance: Normal appearance. She is not ill-appearing.   HENT:      Mouth/Throat:      Mouth: Mucous membranes are moist.   Pulmonary:      Effort: Pulmonary effort is normal.   Abdominal:      General: There is no distension.   Musculoskeletal:      Cervical back: Neck supple.      Comments: ROSALINE hose on BLE, seen ambulating with walker without difficulty   Skin:     General: Skin is warm and dry.      Comments: Left knee with dressing intact   Neurological:      Mental Status: She is alert and oriented to person, place, and time.   Psychiatric:         Mood and Affect: Mood normal.         Assessment/Plan  Diagnoses and all orders for this visit:  Osteoarthritis of left knee, unspecified osteoarthritis type  Comments:  PT/OT, extend perocet for 1 more week, cont with naproxen  Generalized anxiety disorder  Comments:  stop lexapro  History of gastric bypass  Comments:  dietcian to see for preferences         Electronically Signed By: SAMUEL Fernandez-JOSEPH    6/17/25  2:20 PM

## 2025-06-25 LAB
LABORATORY COMMENT REPORT: NORMAL
PATH REPORT.FINAL DX SPEC: NORMAL
PATH REPORT.GROSS SPEC: NORMAL
PATH REPORT.RELEVANT HX SPEC: NORMAL
PATH REPORT.TOTAL CANCER: NORMAL

## 2025-06-27 ENCOUNTER — HOSPITAL ENCOUNTER (OUTPATIENT)
Dept: RADIOLOGY | Facility: HOSPITAL | Age: 55
Discharge: HOME | End: 2025-06-27
Payer: COMMERCIAL

## 2025-06-27 ENCOUNTER — APPOINTMENT (OUTPATIENT)
Dept: ORTHOPEDIC SURGERY | Facility: CLINIC | Age: 55
End: 2025-06-27
Payer: COMMERCIAL

## 2025-06-27 DIAGNOSIS — Z96.652 S/P TOTAL KNEE ARTHROPLASTY, LEFT: ICD-10-CM

## 2025-06-27 PROCEDURE — 3044F HG A1C LEVEL LT 7.0%: CPT

## 2025-06-27 PROCEDURE — 73560 X-RAY EXAM OF KNEE 1 OR 2: CPT | Mod: RT

## 2025-06-27 PROCEDURE — 1036F TOBACCO NON-USER: CPT

## 2025-06-27 PROCEDURE — 73562 X-RAY EXAM OF KNEE 3: CPT | Mod: LT

## 2025-06-27 PROCEDURE — 99024 POSTOP FOLLOW-UP VISIT: CPT

## 2025-06-27 ASSESSMENT — PAIN - FUNCTIONAL ASSESSMENT: PAIN_FUNCTIONAL_ASSESSMENT: 0-10

## 2025-06-27 ASSESSMENT — PAIN SCALES - GENERAL: PAINLEVEL_OUTOF10: 7

## 2025-06-27 NOTE — PROGRESS NOTES
Chief Complaint   Patient presents with    Left Knee - Post-op     6/9/25 LT TKA     Doing well knee is just about 90 degrees, got out of facility a week ago, insurance wont pay for home therapy so I placed outpatient therapy     This is a 55 y.o. female who is 2 weeks out from left total knee.  Pain is under control with current medications.  No drainage from her incision no fevers or chills.  Progressing well with physical therapy and appropriately improving in function.  Patient states she got out of her facility about a week ago and was given PT exercises to do at home.  Patient was told that her home therapy is not covered by her insurance and she will not be able to get it.  Patient is not sure if outpatient therapy would be covered by her insurance either.  Patient states that she was very defeated after going to the x-ray department.  Patient states the x-ray technician made her feel bad that she is not able to bend her knee to 90 degrees and told her that most patients are able to do this at her stage in recovery.  Patient is upset and feels that all the hard work she has done thus far has been for nothing.    Left knee examination: Surgical incision well approximated no erythema no drainage.  Stable to varus valgus stress range of motion 5 to 90 degrees extension flexion.  Neurovascular tact distally    X-rays of the knee were reviewed independently interpreted by me today, the show stable total knee arthroplasty no fracture dislocation or loosening    Impression plan: 55 y.o. female 2 weeks out from left total knee.  We discussed continuing physical therapy and home exercise program. Pain medications to be used as needed. Assistive devices as needed per PT. We discussed DVT prophylaxis until 6 weeks. No dentist until 3 months and thereafter dental prophylaxis for life. Activity as tolerated weightbearing as tolerated. I have personally reviewed the OARRS report for the patient. This report is scanned into  the electronic medical record. I have considered the risks of abuse, dependence, addiction and diversion. Follow up 4 weeks with xrays.  I told the patient to pain her mind what other people say that I think she is doing great.  I told her to continue with her stretching and exercises at home and to try and get outpatient physical therapy set up but make sure that her insurance would cover it.  I told her that she can focus on the bending exercises little bit more if she feels that she needs to work harder on her range of motion.  I gave her referral to outpatient physical therapy and we will see her back in a month.

## 2025-07-02 ENCOUNTER — EVALUATION (OUTPATIENT)
Dept: PHYSICAL THERAPY | Facility: CLINIC | Age: 55
End: 2025-07-02
Payer: COMMERCIAL

## 2025-07-02 DIAGNOSIS — R26.2 DIFFICULTY WALKING: Primary | ICD-10-CM

## 2025-07-02 DIAGNOSIS — Z96.652 S/P TOTAL KNEE ARTHROPLASTY, LEFT: ICD-10-CM

## 2025-07-02 PROCEDURE — 97110 THERAPEUTIC EXERCISES: CPT | Mod: GP | Performed by: PHYSICAL THERAPIST

## 2025-07-02 PROCEDURE — 97161 PT EVAL LOW COMPLEX 20 MIN: CPT | Mod: GP | Performed by: PHYSICAL THERAPIST

## 2025-07-02 ASSESSMENT — PAIN - FUNCTIONAL ASSESSMENT: PAIN_FUNCTIONAL_ASSESSMENT: 0-10

## 2025-07-02 ASSESSMENT — PAIN SCALES - GENERAL: PAINLEVEL_OUTOF10: 7

## 2025-07-02 ASSESSMENT — PAIN DESCRIPTION - DESCRIPTORS: DESCRIPTORS: ACHING

## 2025-07-02 NOTE — PROGRESS NOTES
"Physical Therapy  Physical Therapy Orthopedic Evaluation    Patient Name: Odessa Thompson  MRN: [id number]  Today's Date: 7/2/2025    Insurance:  [facility] / Product Type: *No Product type* /   Number of Treatments Authorized: 1/30          Current Problem  Problem List Items Addressed This Visit           ICD-10-CM    S/P total knee arthroplasty, left - Primary Z96.652    Difficulty walking R26.2       General:  General  Reason for Referral: S/P L TKA  Referred By: Jacek Nicolas Md    Precautions:   Precautions  Precautions Comment: Standard TKA precautions  Rehab Fall Risk: Low: Recent invasive procedure or hospitalization    This patient is identified as a low fall risk based on the highest level factors present.    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      Pain:  Pain Assessment: 0-10  0-10 (Numeric) Pain Score: 7  Pain Type: Surgical pain  Pain Location: Knee  Pain Orientation: Left  Pain Descriptors: Aching    Relevant Information (PMH & Previous Tests/Imaging): no new images  Previous Interventions/Treatments: None    Prior Level of Function (PLOF)  Prior Function Per Pt/Caregiver Report  Level of [place]: Independent with ADLs and functional transfers, Independent with homemaking with ambulation  Patient previously independent with all ADLs    Patients Living Environment:   Home Living Comment: Lives in moblie home - 4 steps to enter with 2 kids at home.    Primary Language: English    Red Flags: Do you have any of the following? Partially  Fever/chills, unexplained weight changes, dizziness/fainting, unexplained change in bowel or bladder " functions, unexplained malaise or muscle weakness, night pain/sweats, numbness or tingling    Objective   Arrives using FWW.  3.5 weeks post op.  Patient with extended hospital stay due to significant weakness following surgery.   Circumferential measures:  joint line: L = 46.5 cm  R = 41.5 cm.   Specific Lower Extremity MMT   R Iliopsoas: (5/5): 4+/5  L Iliopsoas: (5/5): 3-/5  R Gluteals (prone): (5/5): NT  L Gluteals (prone): (5/5): NT (Grossly 3-/5)  R Gluteals (sidelying): (5/5): 4/5  L Gluteals (sidelying): (5/5): 3-/5  R knee flexion: (5/5): 4/5  L knee flexion: (5/5): 3-/5  R knee extension: (5/5): 4/5  L knee extension: (5/5): 3-/5  HIP  Hip AROM  WFL: yes  KNEE  Knee AROM  R knee flexion: (140°): 115  L knee flexion: (140°): 55  R knee extension: (0°): 0  L knee extension: (0°): -6  Knee PROM  L knee flexion: (140°): 66  L knee extension: (0°): -4  ANKLE  Ankle AROM  R ankle dorsiflexion: (10°): 5  L ankle dorsiflexion: (10°): 0  Ankle PROM     Ankle MMT  R ankle dorsiflexion: (5/5): 5/5  L ankle dorsiflexion: (5/5): 4-/5  Outcome Measures:    Other Measures  5x Sit to Stand: 29 s (UE support)  Lower Extremity Funtional Score (LEFS): 26 (32%)    EDUCATION: home exercise program, plan of care, activity modifications, pain management, and injury pathology  Outpatient Education  Patient Response to Education: Patient/Caregiver Verbalized Understanding of Information, Patient/Caregiver Performed Return Demonstration of Exercises/Activities, Patient/Caregiver Asked Appropriate Questions  Education Comment: Access Code: [email or web address]  URL: [email or web address]  Date: 07/02/2025  Prepared by: [place]    Exercises  - Seated Ankle Pumps  - 2-3 x daily - 7 x weekly - 2 sets - 10 reps  - Supine Gluteal Sets  - 2-3 x daily - 7 x weekly - 2 sets - 10 reps  - Supine Quad Set  - 2-3 x daily - 7 x weekly - 2 sets - 10 reps  - Supine Hip Abduction  - 2-3 x daily - 7 x weekly - 2 sets - 10 reps  -  Seated Long Arc Quad  - 2-3 x daily - 7 x weekly - 2 sets - 10 reps  - Standing Weight Shift Side to Side  - 2-3 x daily - 7 x weekly - 2 sets - 10 reps  - March in Place  - 2-3 x daily - 7 x weekly - 2 sets - 10 reps    Assessment:  PT Assessment Results: Decreased strength, Decreased range of motion, Decreased mobility, Decreased skin integrity, Orthopedic restrictions, Pain  Rehab Prognosis: Good  Assessment Comment: Patient 3.5 weeks S/P L TKA.  Patient having to lift her L LE to mat due to weakness at L hip flexors and quads.  Lacks full HEP to address ROM and strength.  Issued today. Incistion well approximated - incision /surgical tape in place.  Will benefit from therapy to address weakness and ROM limitations.    Clinical Presentation: Stable and/or uncomplicated characteristics  Personal Factors: None    Plan:  Treatment/Interventions: Education/ Instruction, Manual therapy, Neuromuscular re-education, Self care/ home management, Therapeutic activities, Therapeutic exercises  PT Plan: Skilled PT  PT Frequency: 2 times per week  Duration: 8 weeks  Onset Date: 06/09/25  Number of Treatments Authorized: 1/30  Rehab Potential: Good  Plan of Care Agreement: Patient      Goals: Set and discussed today  Active       PT Problem       PT Goal 1       Start:  07/03/25    Expected End:  09/30/25       STG  1) Patient will improve LEFS score by 9 points in order to perform functional activities at home and in the community in 4 weeks.  2) Patient will be able to complete ADLs with pain in knee less than 3/10 in 4 weeks.  3) Pt will improve knee Flex ROM to 100 degrees to be able to complete ADLs with less difficulty in 4 weeks.   4) Patient will be independent with HEP to allow for continued improvement in daily tasks at home and in the community in 3 visits.   5) Patient will perform 5 x sit to  less than 16 seconds without UE assist in 4 weeks for ease of transfer and to demonstrate increased LE strength.    LTG  1) Patient will have >/=4+/5 strength in hip musculature to aid in balance with ambulation on varied surfaces in community in 8 weeks  2) Patient will be able to perform proper squatting technique in order to perform lifting requirements at home and work in 8 weeks.  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    4) Patient will improve LEFS score >/=65/80 points in order to perform functional activities at home and in the community by discharge.   5) Patient will demonstrate reciprocal stair climbing with light rail use for home and community access in 8 weeks.              Plan of care was developed with input and agreement by the patient    Treatments:  Therapeutic Exercise  Therapeutic Exercise Activity 1: AP's x 10 (edu re: performing frequently.)  Therapeutic Exercise Activity 2: Supine glute set: 3 hold x 10  Therapeutic Exercise Activity 3: LAQ: x 10  Therapeutic Exercise Activity 4: Supine hip abd: x 10  Therapeutic Exercise Activity 5: Supine QS: 5 hold x 10  Therapeutic Exercise Activity 6: Heel slides x 10  Therapeutic Exercise Activity 7: Standing MIP x 10  Review of current HEP.   Ambulatory Screenings Summary       Screening  Frequency  Date Last Completed   Spiritual and Cultural Beliefs   Screening each visit or episode of care 6/9/2025   Falls Risk Screening every ambulatory visit    Pain Screening annually at primary care visit  5/15/2025   Domestic Violence screening annually at primary care visit 5/15/2025   Depression Screening annually in the primary care setting 6/9/2025   Suicide Risk Screening annually in the primary care setting 6/9/2025   Nutrition and Food Insecurity   Screening at least annually at primary care visit  6/9/2025   Key Learner annually in the primary care setting 6/9/2025   Drug  Screen  6/27/2025 12:23 PM   Alcohol Screen  6/27/2025 12:23 PM   Advance Directive  5/15/2025       Time Calculation  Start Time: 1230  Stop Time: 1315  Time Calculation (min): 45 min  PT Evaluation Time Entry  PT Evaluation (Low) Time Entry: 30, PT Therapeutic Procedures Time Entry  Therapeutic Exercise Time Entry: 13,

## 2025-07-08 ENCOUNTER — TREATMENT (OUTPATIENT)
Dept: PHYSICAL THERAPY | Facility: CLINIC | Age: 55
End: 2025-07-08
Payer: COMMERCIAL

## 2025-07-08 DIAGNOSIS — Z96.652 S/P TOTAL KNEE ARTHROPLASTY, LEFT: ICD-10-CM

## 2025-07-08 DIAGNOSIS — R26.2 DIFFICULTY WALKING: ICD-10-CM

## 2025-07-08 PROCEDURE — 97110 THERAPEUTIC EXERCISES: CPT | Mod: GP,CQ

## 2025-07-08 PROCEDURE — 97140 MANUAL THERAPY 1/> REGIONS: CPT | Mod: GP,CQ

## 2025-07-08 ASSESSMENT — PAIN SCALES - GENERAL: PAINLEVEL_OUTOF10: 6

## 2025-07-08 ASSESSMENT — PAIN - FUNCTIONAL ASSESSMENT: PAIN_FUNCTIONAL_ASSESSMENT: 0-10

## 2025-07-08 NOTE — PROGRESS NOTES
Physical Therapy Treatment    Patient Name: Odessa Thompson  MRN: [id number]  Today's Date: 7/8/2025    Current Problem  Problem List Items Addressed This Visit           ICD-10-CM    S/P total knee arthroplasty, left Z96.652    Difficulty walking R26.2       Insurance:  Payor: [facility] / Plan: [employer] / Product Type: *No Product type* /   Number of Treatments Authorized: 2/30          Subjective   General  Reason for Referral: S/P L TKA (DOS 6/9/25)  Referred By: Jacek Nicolas Md  General Comment: PT STATES SHE WAS VERY SORE IN HER KNEE THE NEXT MORNING AFTER LAST VISIT.  SHE CONTINUES TO BE TIGHT AND SORE IN THE MORNINGS.  SHE FEELS BETTER AS THE DAY GOES ON.  PT 4 WKS POST OP    Performing HEP?: Yes    Precautions  Precautions  Precautions Comment: Standard TKA precautions  Pain  Pain Assessment: 0-10  0-10 (Numeric) Pain Score: 6  Pain Location: Knee  Pain Orientation: Left    Objective   General Observation  General Observation: L KNEE PROM 0* - 85* (PT AMB WITH WW)    Treatments:    Therapeutic Exercise  Therapeutic Exercise Activity 1: SCIFIT SEAT 6 ROM MAN L1 X 8 MIN  Therapeutic Exercise Activity 2: GASTROC STRETCH X 1 MIN  Therapeutic Exercise Activity 3: HEEL RAISES X 1 MIN  Therapeutic Exercise Activity 4: MINI SQUATS X 1 MIN  Therapeutic Exercise Activity 5: HEEL SLIDES WITH STRAP WITH QS X 5 MIN  Therapeutic Exercise Activity 6: SLR WITH MOD - MAX ASSIST X 10         Manual Therapy  Manual Therapy Activity 1: L PF MOBS  Manual Therapy Activity 2: STM L QUAD, IT BAND  Manual Therapy Activity 3: PROM L KNEE EXT, FLEX  Manual Therapy Activity 4: REMOVED STERI STRIPS                        OP EDUCATION:  Outpatient Education  Patient Response to Education: Patient/Caregiver Verbalized Understanding of Information, Patient/Caregiver Performed Return Demonstration of Exercises/Activities, Patient/Caregiver Asked Appropriate Questions  Education Comment: Access Code: [email or web address]  URL:  [email or web address]  Date: 07/08/2025  Prepared by: Rashid Stacy    Exercises  - Heel Raises with Counter Support  - 2 x daily - 7 x weekly - 1 sets - 20 reps  - Mini Squat with Counter Support  - 2 x daily - 7 x weekly - 1 sets - 20 reps    Assessment:  PT Assessment  PT Assessment Results: Decreased strength, Decreased range of motion, Decreased mobility, Decreased skin integrity, Orthopedic restrictions, Pain  Rehab Prognosis: Good  Assessment Comment: PT LEYLA EX'S WELL.  NOTED INCREASED ROM SINCE LAST VISIT.  REMOVED STERI STRIPS AND INCISION LOOKS GOOD.  PT IS NOT ABLE TO PERFORM A SLR WITHOUT ASSISTANCE.  PT WAS A LITTLE SORE AFTER TODAY'S VISIT.    Plan:  OP PT Plan  Treatment/Interventions: Education/ Instruction, Manual therapy, Neuromuscular re-education, Self care/ home management, Therapeutic activities, Therapeutic exercises  PT Plan: Skilled PT (USE QUAD CANE NEXT VISIT)  PT Frequency: 2 times per week  Duration: 8 weeks  Onset Date: 06/09/25  Number of Treatments Authorized: 2/30  Rehab Potential: Good  Plan of Care Agreement: Patient    Goals:  Active       PT Problem       PT Goal 1       Start:  07/03/25    Expected End:  09/30/25       STG  1) Patient will improve LEFS score by 9 points in order to perform functional activities at home and in the community in 4 weeks.  2) Patient will be able to complete ADLs with pain in knee less than 3/10 in 4 weeks.  3) Pt will improve knee Flex ROM to 100 degrees to be able to complete ADLs with less difficulty in 4 weeks.   4) Patient will be independent with HEP to allow for continued improvement in daily tasks at home and in the community in 3 visits.   5) Patient will perform 5 x sit to  less than 16 seconds without UE assist in 4 weeks for ease of transfer and to demonstrate increased LE strength.   LTG  1) Patient will have >/=4+/5 strength in hip musculature to aid in balance with ambulation on varied surfaces in  community in 8 weeks  2) Patient will be able to perform proper squatting technique in order to perform lifting requirements at home and work in 8 weeks.  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    4) Patient will improve LEFS score >/=65/80 points in order to perform functional activities at home and in the community by discharge.   5) Patient will demonstrate reciprocal stair climbing with light rail use for home and community access in 8 weeks.               Time Calculation  Start Time: 1432  Stop Time: 1518  Time Calculation (min): 46 min  PT Therapeutic Procedures Time Entry  Therapeutic Exercise Time Entry: 23  Manual Therapy Time Entry: 23,

## 2025-07-10 ENCOUNTER — TREATMENT (OUTPATIENT)
Dept: PHYSICAL THERAPY | Facility: CLINIC | Age: 55
End: 2025-07-10
Payer: COMMERCIAL

## 2025-07-10 DIAGNOSIS — Z96.652 S/P TOTAL KNEE ARTHROPLASTY, LEFT: ICD-10-CM

## 2025-07-10 DIAGNOSIS — R26.2 DIFFICULTY WALKING: ICD-10-CM

## 2025-07-10 PROCEDURE — 97110 THERAPEUTIC EXERCISES: CPT | Mod: GP | Performed by: PHYSICAL THERAPIST

## 2025-07-10 PROCEDURE — 97140 MANUAL THERAPY 1/> REGIONS: CPT | Mod: GP | Performed by: PHYSICAL THERAPIST

## 2025-07-10 ASSESSMENT — PAIN SCALES - GENERAL: PAINLEVEL_OUTOF10: 5 - MODERATE PAIN

## 2025-07-10 ASSESSMENT — PAIN - FUNCTIONAL ASSESSMENT: PAIN_FUNCTIONAL_ASSESSMENT: 0-10

## 2025-07-10 NOTE — PROGRESS NOTES
Physical Therapy Treatment    Patient Name: Odessa Thompson  MRN: [id number]  Today's Date: 7/10/2025    Current Problem  Problem List Items Addressed This Visit           ICD-10-CM    S/P total knee arthroplasty, left Z96.652    Difficulty walking R26.2       Insurance:  Payor: [facility] / Plan: [employer] / Product Type: *No Product type* /   Number of Treatments Authorized: 3/30          Subjective   General  Reason for Referral: S/P L TKA (DOS 6/9/25)  Referred By: Jacek Nicolas Md  General Comment: Sore following last session. Using ice at home. Reports performing HEP and is exhausted at the end of the day.    Performing HEP?: Yes    Precautions  Precautions  Precautions Comment: Standard TKA precautions  Pain  Pain Assessment: 0-10  0-10 (Numeric) Pain Score: 5 - Moderate pain  Pain Location: Knee  Pain Orientation: Left    Objective   To department with FWW.    General Observation  General Observation: L KNEE PROM 0* - 85* (PT AMB WITH [facility])    Treatments:    Therapeutic Exercise  Therapeutic Exercise Activity 1: SCIFIT SEAT 6 ROM MAN L1 X 8 MIN (10 sec hold time)  Therapeutic Exercise Activity 2: GASTROC STRETCH X 1 MIN  Therapeutic Exercise Activity 3: HEEL RAISES X 1 MIN  Therapeutic Exercise Activity 4: MINI SQUATS X 1 MIN  Therapeutic Exercise Activity 5: HEEL SLIDES WITH STRAP WITH QS X 5 MIN  Therapeutic Exercise Activity 6: SLR WITH MOD - MAX ASSIST X 10  Therapeutic Exercise Activity 7: Seated heel slides x 1 min  Therapeutic Exercise Activity 8: SAQ : x 2 min     Manual Therapy  Manual Therapy Activity 1: L PF mobs  Manual Therapy Activity 2: STM L Quad, ITB  Manual Therapy Activity 3: PRoM L knee    OP EDUCATION:  Outpatient Education  Education Comment: Access Code: [email or web address]  URL: [email or web address]  Date: 07/08/2025  Prepared by: Rashid Stacy    Exercises  - Heel Raises with Counter Support  - 2 x daily - 7 x weekly - 1 sets - 20 reps  - Mini Squat with Counter Support  - 2  x daily - 7 x weekly - 1 sets - 20 reps    Assessment:  PT Assessment  Assessment Comment: Tolerated session well. No change in supine ROM. Seated knee flexion noted at ~ 90 deg's.  Remains guarded re: exercise and general mobility. Will benefit from continued therapy towards goals.  Requires encouragement and some redirection during exercise.    Plan:  OP PT Plan  Treatment/Interventions: Education/ Instruction, Manual therapy, Neuromuscular re-education, Self care/ home management, Therapeutic activities, Therapeutic exercises  PT Plan: Skilled PT (USE QUAD CANE NEXT VISIT)  PT Frequency: 2 times per week  Duration: 8 weeks  Onset Date: 06/09/25  Number of Treatments Authorized: 3/30  Rehab Potential: Good  Plan of Care Agreement: Patient  Progress bike to full revolutions vs rocking.   Goals:  Active       PT Problem       PT Goal 1       Start:  07/03/25    Expected End:  09/30/25       STG  1) Patient will improve LEFS score by 9 points in order to perform functional activities at home and in the community in 4 weeks.  2) Patient will be able to complete ADLs with pain in knee less than 3/10 in 4 weeks.  3) Pt will improve knee Flex ROM to 100 degrees to be able to complete ADLs with less difficulty in 4 weeks.   4) Patient will be independent with HEP to allow for continued improvement in daily tasks at home and in the community in 3 visits.   5) Patient will perform 5 x sit to  less than 16 seconds without UE assist in 4 weeks for ease of transfer and to demonstrate increased LE strength.   LTG  1) Patient will have >/=4+/5 strength in hip musculature to aid in balance with ambulation on varied surfaces in community in 8 weeks  2) Patient will be able to perform proper squatting technique in order to perform lifting requirements at home and work in 8 weeks.  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    4) Patient will improve LEFS score >/=65/80 points in order to perform functional activities at home and in the community by discharge.   5) Patient will demonstrate reciprocal stair climbing with light rail use for home and community access in 8 weeks.               Time Calculation  Start Time: 1008  Stop Time: 1048  Time Calculation (min): 40 min  PT Therapeutic Procedures Time Entry  Therapeutic Exercise Time Entry: 28  Manual Therapy Time Entry: 10,

## 2025-07-15 ENCOUNTER — APPOINTMENT (OUTPATIENT)
Dept: PRIMARY CARE | Facility: CLINIC | Age: 55
End: 2025-07-15
Payer: COMMERCIAL

## 2025-07-15 ENCOUNTER — TREATMENT (OUTPATIENT)
Dept: PHYSICAL THERAPY | Facility: CLINIC | Age: 55
End: 2025-07-15
Payer: COMMERCIAL

## 2025-07-15 DIAGNOSIS — R26.2 DIFFICULTY WALKING: ICD-10-CM

## 2025-07-15 DIAGNOSIS — Z96.652 S/P TOTAL KNEE ARTHROPLASTY, LEFT: ICD-10-CM

## 2025-07-15 PROCEDURE — 97140 MANUAL THERAPY 1/> REGIONS: CPT | Mod: GP,CQ

## 2025-07-15 PROCEDURE — 97110 THERAPEUTIC EXERCISES: CPT | Mod: GP,CQ

## 2025-07-15 ASSESSMENT — PAIN SCALES - GENERAL: PAINLEVEL_OUTOF10: 6

## 2025-07-15 ASSESSMENT — PAIN - FUNCTIONAL ASSESSMENT: PAIN_FUNCTIONAL_ASSESSMENT: 0-10

## 2025-07-15 NOTE — PROGRESS NOTES
"Physical Therapy Treatment    Patient Name: Odessa Thompson  MRN: [id number]  Today's Date: 7/15/2025    Current Problem  Problem List Items Addressed This Visit           ICD-10-CM    S/P total knee arthroplasty, left Z96.652    Difficulty walking R26.2       Insurance:  Payor: [facility] / Plan: [employer] / Product Type: *No Product type* /   Number of Treatments Authorized: 4/30          Subjective   General  Reason for Referral: S/P L TKA (DOS 6/9/25)  Referred By: Jacek Nicolas Md  General Comment: PT STATES HER KNEE IS SORE THIS MORNING.  SHE HAS BEEN WALKING A LITTLE MORE OUTSIDE BECAUSE THE TEMPERATURE HAS BEEN COOLER.    Performing HEP?: Yes    Precautions  Precautions  Precautions Comment: Standard TKA precautions  Pain  Pain Assessment: 0-10  0-10 (Numeric) Pain Score: 6  Pain Location: Knee  Pain Orientation: Left    Objective   General Observation  General Observation: L KNEE PROM 0* - 89* (PT AMB WITH [facility])    Treatments:    Therapeutic Exercise  Therapeutic Exercise Activity 1: SCIFIT SEAT 6 ROM MAN L1 X 8 MIN (10 sec hold time)  Therapeutic Exercise Activity 2: GASTROC STRETCH X 1 MIN  Therapeutic Exercise Activity 3: HEEL RAISES X 1 MIN  Therapeutic Exercise Activity 4: SIT TO STAND NO UE ASSIST X 1 MIN  Therapeutic Exercise Activity 5: HEEL SLIDES WITH STRAP WITH QS X 5 MIN  Therapeutic Exercise Activity 6: SLR WITH MOD - MAX ASSIST X 10  Therapeutic Exercise Activity 7: SAQ : x 2 min  Therapeutic Exercise Activity 8: 4\" L FWD STEP UPS // BAR X 2 MIN  Therapeutic Exercise Activity 9: 6\" / 8\" R FWD STEP UPS // BAR X 2 MIN EACH         Manual Therapy  Manual Therapy Activity 1: L PF mobs  Manual Therapy Activity 2: PROM L KNEE EXT, FLEX                        OP EDUCATION:  Outpatient Education  Education Comment: STEP UPS ON BOTTOM STEP GOING UP AND DOWN WITH R LE    Assessment:  PT Assessment  Assessment Comment: PT LEYLA EX'S WELL.  PT NEEDS ENCOURAGEMENT TO PERFORM EX'S THAT BEND HER L KNEE.  NOTED " INCREASED KNEE FLEX ROM.  PT IS SLOWLY PROGRESSING TOWARDS GOALS.    Plan:  OP PT Plan  Treatment/Interventions: Education/ Instruction, Manual therapy, Neuromuscular re-education, Self care/ home management, Therapeutic activities, Therapeutic exercises  PT Plan: Skilled PT  PT Frequency: 2 times per week  Duration: 8 weeks  Onset Date: 06/09/25  Number of Treatments Authorized: 4/30  Rehab Potential: Good  Plan of Care Agreement: Patient    Goals:  Active       PT Problem       PT Goal 1       Start:  07/03/25    Expected End:  09/30/25       STG  1) Patient will improve LEFS score by 9 points in order to perform functional activities at home and in the community in 4 weeks.  2) Patient will be able to complete ADLs with pain in knee less than 3/10 in 4 weeks.  3) Pt will improve knee Flex ROM to 100 degrees to be able to complete ADLs with less difficulty in 4 weeks.   4) Patient will be independent with HEP to allow for continued improvement in daily tasks at home and in the community in 3 visits.   5) Patient will perform 5 x sit to  less than 16 seconds without UE assist in 4 weeks for ease of transfer and to demonstrate increased LE strength.   LTG  1) Patient will have >/=4+/5 strength in hip musculature to aid in balance with ambulation on varied surfaces in community in 8 weeks  2) Patient will be able to perform proper squatting technique in order to perform lifting requirements at home and work in 8 weeks.  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    4) Patient will improve LEFS score >/=65/80 points in order to perform functional activities at home and in the community by discharge.   5) Patient will demonstrate reciprocal stair climbing with light rail use for home and community access in 8 weeks.                Time Calculation  Start Time: 1028  Stop Time: 1115  Time Calculation (min): 47 min  PT Therapeutic Procedures Time Entry  Therapeutic Exercise Time Entry: 35  Manual Therapy Time Entry: 12,

## 2025-07-17 ENCOUNTER — APPOINTMENT (OUTPATIENT)
Dept: PRIMARY CARE | Facility: CLINIC | Age: 55
End: 2025-07-17
Payer: COMMERCIAL

## 2025-07-17 ENCOUNTER — TREATMENT (OUTPATIENT)
Dept: PHYSICAL THERAPY | Facility: CLINIC | Age: 55
End: 2025-07-17
Payer: COMMERCIAL

## 2025-07-17 DIAGNOSIS — R26.2 DIFFICULTY WALKING: ICD-10-CM

## 2025-07-17 DIAGNOSIS — Z96.652 S/P TOTAL KNEE ARTHROPLASTY, LEFT: ICD-10-CM

## 2025-07-17 PROCEDURE — 97140 MANUAL THERAPY 1/> REGIONS: CPT | Mod: GP,CQ

## 2025-07-17 PROCEDURE — 97110 THERAPEUTIC EXERCISES: CPT | Mod: GP,CQ

## 2025-07-17 ASSESSMENT — PAIN - FUNCTIONAL ASSESSMENT: PAIN_FUNCTIONAL_ASSESSMENT: 0-10

## 2025-07-17 ASSESSMENT — PAIN SCALES - GENERAL: PAINLEVEL_OUTOF10: 7

## 2025-07-17 NOTE — PROGRESS NOTES
"Physical Therapy Treatment    Patient Name: Odessa Thompson  MRN: [id number]  Today's Date: 7/17/2025    Current Problem  Problem List Items Addressed This Visit           ICD-10-CM    S/P total knee arthroplasty, left Z96.652    Difficulty walking R26.2       Insurance:  Payor: [facility] / Plan: [employer] / Product Type: *No Product type* /   Number of Treatments Authorized: 5/30          Subjective   General  Reason for Referral: S/P L TKA (DOS 6/9/25)  Referred By: Jacek Nicolas Md  General Comment: PT STATES SHE WOKE UP TIGHT AND SORE.  SHE DID A LITTLE MORE WALKING THAN NORMAL.    Performing HEP?: Yes    Precautions  Precautions  Precautions Comment: Standard TKA precautions  Pain  Pain Assessment: 0-10  0-10 (Numeric) Pain Score: 7  Pain Location: Knee  Pain Orientation: Left    Objective   General Observation  General Observation: L KNEE PROM 0* - 93** (PT AMB WITH [facility])    Treatments:    Therapeutic Exercise  Therapeutic Exercise Activity 1: SCIFIT SEAT 7 ROM MAN L1 X 10 MIN (10 sec hold time)  Therapeutic Exercise Activity 2: GASTROC STRETCH X 1 MIN  Therapeutic Exercise Activity 3: HEEL RAISES X 1 MIN  Therapeutic Exercise Activity 4: 6\" / 8\" STEP SELF KNEE FLEX MOBS X 1.5 MIN EACH  Therapeutic Exercise Activity 5: HEEL SLIDES WITH STRAP WITH QS X 3 MIN  Therapeutic Exercise Activity 6: SLR WITH MIN - MOD ASSIST X 10  Therapeutic Exercise Activity 7: SAQ X 3 MIN         Manual Therapy  Manual Therapy Activity 1: L PF mobs  Manual Therapy Activity 2: SEATED PROM L KNEE EXT, FLEX  Manual Therapy Activity 3: SCAR MOBS  Manual Therapy Activity 4: SEATED L KNEE A/P MOBS                        OP EDUCATION:  Outpatient Education  Education Comment: CONTINUE WITH CURRENT HEP    Assessment:  PT Assessment  Assessment Comment: PT LEYLA EX'S WELL. NOTED INCREASED L KNEE FLEX ROM.    Plan:  OP PT Plan  Treatment/Interventions: Education/ Instruction, Manual therapy, Neuromuscular re-education, Self care/ home " management, Therapeutic activities, Therapeutic exercises  PT Plan: Skilled PT  PT Frequency: 2 times per week  Duration: 8 weeks  Onset Date: 06/09/25  Number of Treatments Authorized: 5/30  Rehab Potential: Good  Plan of Care Agreement: Patient    Goals:  Active       PT Problem       PT Goal 1       Start:  07/03/25    Expected End:  09/30/25       STG  1) Patient will improve LEFS score by 9 points in order to perform functional activities at home and in the community in 4 weeks.  2) Patient will be able to complete ADLs with pain in knee less than 3/10 in 4 weeks.  3) Pt will improve knee Flex ROM to 100 degrees to be able to complete ADLs with less difficulty in 4 weeks.   4) Patient will be independent with HEP to allow for continued improvement in daily tasks at home and in the community in 3 visits.   5) Patient will perform 5 x sit to  less than 16 seconds without UE assist in 4 weeks for ease of transfer and to demonstrate increased LE strength.   LTG  1) Patient will have >/=4+/5 strength in hip musculature to aid in balance with ambulation on varied surfaces in community in 8 weeks  2) Patient will be able to perform proper squatting technique in order to perform lifting requirements at home and work in 8 weeks.  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    4) Patient will improve LEFS score >/=65/80 points in order to perform functional activities at home and in the community by discharge.   5) Patient will demonstrate reciprocal stair climbing with light rail use for home and community access in 8 weeks.               Time Calculation  Start Time: 0947  Stop Time: 1034  Time Calculation (min): 47 min  PT Therapeutic Procedures Time Entry  Therapeutic Exercise Time Entry: 25  Manual Therapy Time Entry:  22,

## 2025-07-21 ENCOUNTER — TREATMENT (OUTPATIENT)
Dept: PHYSICAL THERAPY | Facility: CLINIC | Age: 55
End: 2025-07-21
Payer: COMMERCIAL

## 2025-07-21 DIAGNOSIS — Z96.652 S/P TOTAL KNEE ARTHROPLASTY, LEFT: ICD-10-CM

## 2025-07-21 DIAGNOSIS — R26.2 DIFFICULTY WALKING: ICD-10-CM

## 2025-07-21 PROCEDURE — 97140 MANUAL THERAPY 1/> REGIONS: CPT | Mod: GP,CQ

## 2025-07-21 PROCEDURE — 97110 THERAPEUTIC EXERCISES: CPT | Mod: GP,CQ

## 2025-07-21 ASSESSMENT — PAIN SCALES - GENERAL: PAINLEVEL_OUTOF10: 6

## 2025-07-21 ASSESSMENT — PAIN - FUNCTIONAL ASSESSMENT: PAIN_FUNCTIONAL_ASSESSMENT: 0-10

## 2025-07-21 NOTE — PROGRESS NOTES
Physical Therapy Treatment    Patient Name: Odessa Thompson  MRN: [id number]  Today's Date: 7/21/2025    Current Problem  Problem List Items Addressed This Visit           ICD-10-CM    S/P total knee arthroplasty, left Z96.652    Difficulty walking R26.2       Insurance:  Payor: [facility] / Plan: [employer] / Product Type: *No Product type* /   Number of Treatments Authorized: 6/30          Subjective   General  Reason for Referral: S/P L TKA (DOS 6/9/25)  Referred By: Jacek Nicolas Md  General Comment: PT STATES SHE DID A LOT OF WALKING OVER THE WEEKEND AND THE KNEE IS HURTING.    Performing HEP?: Yes    Precautions  Precautions  Precautions Comment: Standard TKA precautions  Pain  Pain Assessment: 0-10  0-10 (Numeric) Pain Score: 6  Pain Location: Knee  Pain Orientation: Left    Objective   General Observation  General Observation: L KNEE PROM 0* - 94* (PT AMB WITH [facility])    Treatments:    Therapeutic Exercise  Therapeutic Exercise Activity 1: SCIFIT SEAT 7, 6 ROM MAN L1 X 10 MIN (10 sec hold time)  Therapeutic Exercise Activity 2: GASTROC STRETCH X 1 MIN  Therapeutic Exercise Activity 3: HEEL RAISES X 1 MIN  Therapeutic Exercise Activity 4: SAQ X 3 MIN  Therapeutic Exercise Activity 5: HEEL SLIDES WITH STRAP WITH QS X 3 MIN  Therapeutic Exercise Activity 6: SLR WITH MIN - NO ASSIST X 10  Therapeutic Exercise Activity 7: STANDING SLR X 1 MIN  Therapeutic Exercise Activity 8: MINI SQUATS X 1 MIN         Manual Therapy  Manual Therapy Activity 1: L PF mobs  Manual Therapy Activity 2: STM L KNEE, QUAD, IT BAND                        OP EDUCATION:  Outpatient Education  Education Comment: CONTINUE WITH CURRENT HEP    Assessment:  PT Assessment  Assessment Comment: PT LEYLA EX'S WELL.  SHE CONTINUES TO SHOW IMPROVEMENTS WITH HER ROM AND STRENGTH.  SHE WAS ABLE TO PERFORM A SLR WITHOUT ANY ASSISTANCE TODAY.    Plan:  OP PT Plan  Treatment/Interventions: Education/ Instruction, Manual therapy, Neuromuscular re-education, Self  care/ home management, Therapeutic activities, Therapeutic exercises  PT Plan: Skilled PT  PT Frequency: 2 times per week  Duration: 8 weeks  Onset Date: 06/09/25  Number of Treatments Authorized: 6/30  Rehab Potential: Good  Plan of Care Agreement: Patient    Goals:  Active       PT Problem       PT Goal 1       Start:  07/03/25    Expected End:  09/30/25       STG  1) Patient will improve LEFS score by 9 points in order to perform functional activities at home and in the community in 4 weeks.  2) Patient will be able to complete ADLs with pain in knee less than 3/10 in 4 weeks.  3) Pt will improve knee Flex ROM to 100 degrees to be able to complete ADLs with less difficulty in 4 weeks.   4) Patient will be independent with HEP to allow for continued improvement in daily tasks at home and in the community in 3 visits.   5) Patient will perform 5 x sit to  less than 16 seconds without UE assist in 4 weeks for ease of transfer and to demonstrate increased LE strength.   LTG  1) Patient will have >/=4+/5 strength in hip musculature to aid in balance with ambulation on varied surfaces in community in 8 weeks  2) Patient will be able to perform proper squatting technique in order to perform lifting requirements at home and work in 8 weeks.  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    4) Patient will improve LEFS score >/=65/80 points in order to perform functional activities at home and in the community by discharge.   5) Patient will demonstrate reciprocal stair climbing with light rail use for home and community access in 8 weeks.               Time Calculation  Start Time: 0820  Stop Time: 0901  Time Calculation (min): 41 min  PT Therapeutic Procedures Time Entry  Therapeutic Exercise Time Entry: 31  Manual Therapy  Time Entry: 10,     (4) walks frequently

## 2025-07-23 ENCOUNTER — TREATMENT (OUTPATIENT)
Dept: PHYSICAL THERAPY | Facility: CLINIC | Age: 55
End: 2025-07-23
Payer: COMMERCIAL

## 2025-07-23 DIAGNOSIS — R26.2 DIFFICULTY WALKING: ICD-10-CM

## 2025-07-23 DIAGNOSIS — Z96.652 S/P TOTAL KNEE ARTHROPLASTY, LEFT: ICD-10-CM

## 2025-07-23 PROCEDURE — 97140 MANUAL THERAPY 1/> REGIONS: CPT | Mod: GP | Performed by: PHYSICAL THERAPIST

## 2025-07-23 PROCEDURE — 97110 THERAPEUTIC EXERCISES: CPT | Mod: GP | Performed by: PHYSICAL THERAPIST

## 2025-07-23 ASSESSMENT — PAIN SCALES - GENERAL: PAINLEVEL_OUTOF10: 3

## 2025-07-23 ASSESSMENT — PAIN - FUNCTIONAL ASSESSMENT: PAIN_FUNCTIONAL_ASSESSMENT: 0-10

## 2025-07-23 NOTE — PROGRESS NOTES
Physical Therapy Treatment    Patient Name: Odessa Thompson  MRN: [id number]  Today's Date: 7/23/2025    Current Problem  Problem List Items Addressed This Visit           ICD-10-CM    S/P total knee arthroplasty, left Z96.652    Difficulty walking R26.2       Insurance:  Payor: [facility] / Plan: [employer] / Product Type: *No Product type* /   Number of Treatments Authorized: 7/30          Subjective   General  Reason for Referral: S/P L TKA (DOS 6/9/25)  Referred By: Jacek Nicolas Md  General Comment: Has QC at home - using for short distances.  Performing HEP 2 x a day.    Performing HEP?: Yes    Precautions  Precautions  Precautions Comment: Standard TKA precautions  Pain  Pain Assessment: 0-10  0-10 (Numeric) Pain Score: 3  Pain Location: Knee  Pain Orientation: Left    Objective   To department with FWW.  Incision sensitive to touch - instructed to touch knee more often to desensitize surrounding incision.     Treatments:    Therapeutic Exercise  Therapeutic Exercise Activity 1: SCIFIT SEAT 7, 6 ROM MAN L1 X 6 min (10 sec hold time)  Therapeutic Exercise Activity 2: Standing hip ABD: ALT x 1 min  Therapeutic Exercise Activity 3: Standing hip EXT: ALT x 1 min  Therapeutic Exercise Activity 4: LAQ : x 1 min  Therapeutic Exercise Activity 5: Standing mini squats: x 1 min  Therapeutic Exercise Activity 6: SLR WITH MIN - NO ASSIST 2 X 10  Therapeutic Exercise Activity 7: Bridges: 2 x 1 min  Therapeutic Exercise Activity 8: Heel slides: w/ strap and sliding board x 3 min     Manual Therapy  Manual Therapy Activity 1: P/A, A/P, ROT mobs L knee  Manual Therapy Activity 2: STM L quad, ITB  Manual Therapy Activity 3: PROM L knee - flexion focus    Therapeutic Activity  Therapeutic Activity 1: Gait with SPC througout session walking station to station with cues for proper sequencing.    OP EDUCATION:  Outpatient Education  Education Comment: Instructed to perform HEP 3 x a day.  Instruction re: use of SPC and that  patient needs SPC vs QC at this time.    Assessment:  PT Assessment  Assessment Comment: Patient tolerated session well.  Indicates understanding re: need for increased exercise frequency.  Encouraged  touching knee to desensitize. Will benefit from continued therapy towards goals.    Plan:  OP PT Plan  Treatment/Interventions: Education/ Instruction, Manual therapy, Neuromuscular re-education, Self care/ home management, Therapeutic activities, Therapeutic exercises  PT Plan: Skilled PT  PT Frequency: 2 times per week  Duration: 8 weeks  Onset Date: 06/09/25  Number of Treatments Authorized: 7/30  Rehab Potential: Good  Plan of Care Agreement: Patient  Progress to tolerance. Continue training for SPC use.   Goals:  Active       PT Problem       PT Goal 1       Start:  07/03/25    Expected End:  09/30/25       STG  1) Patient will improve LEFS score by 9 points in order to perform functional activities at home and in the community in 4 weeks.  2) Patient will be able to complete ADLs with pain in knee less than 3/10 in 4 weeks.  3) Pt will improve knee Flex ROM to 100 degrees to be able to complete ADLs with less difficulty in 4 weeks.   4) Patient will be independent with HEP to allow for continued improvement in daily tasks at home and in the community in 3 visits.   5) Patient will perform 5 x sit to  less than 16 seconds without UE assist in 4 weeks for ease of transfer and to demonstrate increased LE strength.   LTG  1) Patient will have >/=4+/5 strength in hip musculature to aid in balance with ambulation on varied surfaces in community in 8 weeks  2) Patient will be able to perform proper squatting technique in order to perform lifting requirements at home and work in 8 weeks.  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    4) Patient will improve LEFS score >/=65/80 points in order to perform functional activities at home and in the community by discharge.   5) Patient will demonstrate reciprocal stair climbing with light rail use for home and community access in 8 weeks.               Time Calculation  Start Time: 1000  Stop Time: 1045  Time Calculation (min): 45 min  PT Therapeutic Procedures Time Entry  Therapeutic Exercise Time Entry: 23  Manual Therapy Time Entry: 15  Therapeutic Activity Time Entry: 5,

## 2025-07-25 ENCOUNTER — HOSPITAL ENCOUNTER (OUTPATIENT)
Dept: RADIOLOGY | Facility: HOSPITAL | Age: 55
Discharge: HOME | End: 2025-07-25
Payer: COMMERCIAL

## 2025-07-25 ENCOUNTER — OFFICE VISIT (OUTPATIENT)
Dept: ORTHOPEDIC SURGERY | Facility: CLINIC | Age: 55
End: 2025-07-25
Payer: COMMERCIAL

## 2025-07-25 ENCOUNTER — APPOINTMENT (OUTPATIENT)
Dept: ORTHOPEDIC SURGERY | Facility: CLINIC | Age: 55
End: 2025-07-25
Payer: COMMERCIAL

## 2025-07-25 DIAGNOSIS — Z96.652 S/P TOTAL KNEE ARTHROPLASTY, LEFT: ICD-10-CM

## 2025-07-25 PROCEDURE — 73564 X-RAY EXAM KNEE 4 OR MORE: CPT | Mod: LT

## 2025-07-25 PROCEDURE — 73562 X-RAY EXAM OF KNEE 3: CPT | Mod: RT

## 2025-07-25 PROCEDURE — 99024 POSTOP FOLLOW-UP VISIT: CPT

## 2025-07-25 PROCEDURE — 3044F HG A1C LEVEL LT 7.0%: CPT

## 2025-07-25 ASSESSMENT — PAIN - FUNCTIONAL ASSESSMENT: PAIN_FUNCTIONAL_ASSESSMENT: 0-10

## 2025-07-25 ASSESSMENT — PAIN SCALES - GENERAL: PAINLEVEL_OUTOF10: 3

## 2025-07-25 NOTE — PROGRESS NOTES
Doing great, doing well with outpatient pt, wants to be able to walk without assisted devices when its time for school in September 95 flexion   Chief Complaint   Patient presents with    Left Knee - Post-op     6/9/25 LT TKA       This is a 55 y.o. female who is 6 weeks out from left total knee.  No drainage from her incision no fevers or chills.  Progressing well with physical therapy and appropriately improving in function.  She has a goal of not walking with any assistive devices when school starts in September.  No other new issues or symptoms.    Left knee examination: Surgical incision well healed no erythema no drainage.  Stable to varus valgus stress range of motion 5 to 95 degrees extension flexion.  Neurovascular tact distally    X-rays of the knee were reviewed independently interpreted by me today, the show stable total knee arthroplasty no fracture dislocation or loosening    Impression plan: 55 y.o. female 6 weeks out from left total knee.  We discussed continuing physical therapy and home exercise program. Pain medications to be used as needed. Assistive devices as needed per PT. We discussed DVT prophylaxis until 6 weeks. No dentist until 3 months and thereafter dental prophylaxis for life. Activity as tolerated weightbearing as tolerated. I have personally reviewed the OAR[facility] report for the patient. This report is scanned into the electronic medical record. I have considered the risks of abuse, dependence, addiction and diversion. Follow up 3 months with xrays.

## 2025-07-28 ENCOUNTER — TREATMENT (OUTPATIENT)
Dept: PHYSICAL THERAPY | Facility: CLINIC | Age: 55
End: 2025-07-28
Payer: COMMERCIAL

## 2025-07-28 DIAGNOSIS — R26.2 DIFFICULTY WALKING: ICD-10-CM

## 2025-07-28 DIAGNOSIS — Z96.652 S/P TOTAL KNEE ARTHROPLASTY, LEFT: ICD-10-CM

## 2025-07-28 PROCEDURE — 97110 THERAPEUTIC EXERCISES: CPT | Mod: GP,CQ

## 2025-07-28 ASSESSMENT — PAIN SCALES - GENERAL: PAINLEVEL_OUTOF10: 6

## 2025-07-28 ASSESSMENT — PAIN - FUNCTIONAL ASSESSMENT: PAIN_FUNCTIONAL_ASSESSMENT: 0-10

## 2025-07-28 NOTE — PROGRESS NOTES
"Physical Therapy Treatment    Patient Name: Odessa Thompson  MRN: [id number]  Today's Date: 7/28/2025    Current Problem  Problem List Items Addressed This Visit           ICD-10-CM    S/P total knee arthroplasty, left Z96.652    Difficulty walking R26.2       Insurance:  Payor: [facility] / Plan: [employer] / Product Type: *No Product type* /   Number of Treatments Authorized: 8/16   (HAS 30 V A YR)  Certification Period Start Date: 07/21/25  Certification Period End Date: 09/30/25    Subjective   General  Reason for Referral: S/P L TKA (DOS 6/9/25)  Referred By: Jacek Nicolas Md  General Comment: PT STATES HER KNEE HURTS ON THE OUTSIDE WHEN SHE IS WALKING.  SHE IS WEARING NORMAL SHOES TODAY ALSO.  PT HAS NOT BOUGHT A CANE YET.    Performing HEP?: Yes    Precautions  Precautions  Precautions Comment: Standard TKA precautions  Pain  Pain Assessment: 0-10  0-10 (Numeric) Pain Score: 6  Pain Location: Knee  Pain Orientation: Left    Objective   General Observation  General Observation: 0-92 deg in supine with strap    Treatments:    Therapeutic Exercise  Therapeutic Exercise Activity 1: SCIFIT SEAT 7 ROM MAN L1 X 10 MIN  Therapeutic Exercise Activity 2: GASTROC STRETCH X 1 MIN  Therapeutic Exercise Activity 3: HEEL RAISES X 1 MIN  Therapeutic Exercise Activity 4: MINI SQUATS X 1 MIN  Therapeutic Exercise Activity 5: HAM CURLS 20# X 2 MIN  Therapeutic Exercise Activity 6: LAQ : x 1 min  Therapeutic Exercise Activity 7: HEEL SLIDES WITH STRAP WITH QS X 4 MIN  Therapeutic Exercise Activity 8: SLR X 10         Manual Therapy  Manual Therapy Performed: No    Therapeutic Activity  Therapeutic Activity 1: Gait with SPC througout session walking station to station with cues for proper sequencing.  Therapeutic Activity 2: 4\" STEP UPS // BAR 2 X 1 MIN  Therapeutic Activity 3: 8\" STEP L FOOT TAP X 2 MIN                   OP EDUCATION:  Outpatient Education  Education Comment: CONTINUE WITH CURRENT HEP    Assessment:  PT " Assessment  Assessment Comment: PT LEYLA EX'S WELL.  SHE CONTINUES TO SLOWLY IMPROVE WITH HER ROM, STRENGTH AND NORMALIZING GAIT.    Plan:  OP PT Plan  Treatment/Interventions: Education/ Instruction, Manual therapy, Neuromuscular re-education, Self care/ home management, Therapeutic activities, Therapeutic exercises  PT Plan: Skilled PT  PT Frequency: 2 times per week  Duration: 8 weeks  Onset Date: 06/09/25  Certification Period Start Date: 07/21/25  Certification Period End Date: 09/30/25  Number of Treatments Authorized: 8/16   (HAS 30 V A YR)  Rehab Potential: Good  Plan of Care Agreement: Patient    Goals:  Active       PT Problem       PT Goal 1       Start:  07/03/25    Expected End:  09/30/25       STG  1) Patient will improve LEFS score by 9 points in order to perform functional activities at home and in the community in 4 weeks.  2) Patient will be able to complete ADLs with pain in knee less than 3/10 in 4 weeks.  3) Pt will improve knee Flex ROM to 100 degrees to be able to complete ADLs with less difficulty in 4 weeks.   4) Patient will be independent with HEP to allow for continued improvement in daily tasks at home and in the community in 3 visits.   5) Patient will perform 5 x sit to  less than 16 seconds without UE assist in 4 weeks for ease of transfer and to demonstrate increased LE strength.   LTG  1) Patient will have >/=4+/5 strength in hip musculature to aid in balance with ambulation on varied surfaces in community in 8 weeks  2) Patient will be able to perform proper squatting technique in order to perform lifting requirements at home and work in 8 weeks.  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    4) Patient will improve LEFS score >/=65/80 points in order to perform functional activities at  home and in the community by discharge.   5) Patient will demonstrate reciprocal stair climbing with light rail use for home and community access in 8 weeks.               Time Calculation  Start Time: 0948  Stop Time: 1032  Time Calculation (min): 44 min  PT Therapeutic Procedures Time Entry  Therapeutic Exercise Time Entry: 38  Therapeutic Activity Time Entry: 6,

## 2025-07-30 ENCOUNTER — APPOINTMENT (OUTPATIENT)
Dept: PHYSICAL THERAPY | Facility: CLINIC | Age: 55
End: 2025-07-30
Payer: COMMERCIAL

## 2025-07-30 DIAGNOSIS — Z96.652 S/P TOTAL KNEE ARTHROPLASTY, LEFT: ICD-10-CM

## 2025-08-04 ENCOUNTER — TREATMENT (OUTPATIENT)
Dept: PHYSICAL THERAPY | Facility: CLINIC | Age: 55
End: 2025-08-04
Payer: COMMERCIAL

## 2025-08-04 DIAGNOSIS — R26.2 DIFFICULTY WALKING: ICD-10-CM

## 2025-08-04 DIAGNOSIS — Z96.652 S/P TOTAL KNEE ARTHROPLASTY, LEFT: ICD-10-CM

## 2025-08-04 PROCEDURE — 97110 THERAPEUTIC EXERCISES: CPT | Mod: GP,CQ

## 2025-08-04 PROCEDURE — 97140 MANUAL THERAPY 1/> REGIONS: CPT | Mod: GP,CQ

## 2025-08-04 ASSESSMENT — PAIN SCALES - GENERAL: PAINLEVEL_OUTOF10: 7

## 2025-08-04 ASSESSMENT — PAIN - FUNCTIONAL ASSESSMENT: PAIN_FUNCTIONAL_ASSESSMENT: 0-10
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
## 2025-08-06 ENCOUNTER — TREATMENT (OUTPATIENT)
Dept: PHYSICAL THERAPY | Facility: CLINIC | Age: 55
End: 2025-08-06
Payer: COMMERCIAL

## 2025-08-06 DIAGNOSIS — Z96.652 S/P TOTAL KNEE ARTHROPLASTY, LEFT: ICD-10-CM

## 2025-08-06 DIAGNOSIS — R26.2 DIFFICULTY WALKING: ICD-10-CM

## 2025-08-06 PROCEDURE — 97110 THERAPEUTIC EXERCISES: CPT | Mod: GP,CQ

## 2025-08-06 PROCEDURE — 97530 THERAPEUTIC ACTIVITIES: CPT | Mod: GP,CQ

## 2025-08-06 ASSESSMENT — PAIN SCALES - GENERAL: PAINLEVEL_OUTOF10: 4

## 2025-08-06 ASSESSMENT — PAIN - FUNCTIONAL ASSESSMENT: PAIN_FUNCTIONAL_ASSESSMENT: 0-10

## 2025-08-06 NOTE — PROGRESS NOTES
"Physical Therapy Treatment    Patient Name: Odessa Thompson  MRN: [id number]  Today's Date: 8/6/2025    Current Problem  Problem List Items Addressed This Visit           ICD-10-CM    S/P total knee arthroplasty, left Z96.652    Difficulty walking R26.2       Insurance:  Payor: [facility] / Plan: [employer] / Product Type: *No Product type* /   Number of Treatments Authorized: 10/16   (HAS 30 V A YR)  Certification Period Start Date: 07/21/25  Certification Period End Date: 09/30/25    Subjective   General  Reason for Referral: S/P L TKA (DOS 6/9/25)  Referred By: Jacek Nicolas Md  General Comment: PT STATES SHE WAS STANDING FOR ABOUT A HOUR YESTERDAY AND THEN HAD TO WALK AROUND FOR ABOUT ANOTHER HOUR AND SHE WAS VERY TIRED AND SORE AT THE END OF THE DAY.  SHE IS VERY STIFF THIS MORNING.    Performing HEP?: Yes    Precautions  Precautions  Precautions Comment: Standard TKA precautions  Pain  Pain Assessment: 0-10  0-10 (Numeric) Pain Score: 4  Pain Location: Knee  Pain Orientation: Left    Objective   General Observation  General Observation: 0-92 deg in supine with strap    Treatments:    Therapeutic Exercise  Therapeutic Exercise Activity 1: SCIFIT SEAT 7 ROM MAN L1 X 10 MIN  Therapeutic Exercise Activity 2: GASTROC STRETCH X 1 MIN  Therapeutic Exercise Activity 3: HEEL RAISES X 1 MIN  Therapeutic Exercise Activity 4: MINI SQUATS X 1 MIN  Therapeutic Exercise Activity 5: HAM CURLS 30# X 2 MIN  Therapeutic Exercise Activity 6: LAQ : x 1 min  Therapeutic Exercise Activity 7: HEEL SLIDES WITH STRAP WITH QS X 5 MIN  Therapeutic Exercise Activity 8: SLR X 10         Manual Therapy  Manual Therapy Performed: No    Therapeutic Activity  Therapeutic Activity Performed: Yes  Therapeutic Activity 1: 4\" STEP UPS // BAR X 5 MIN  Therapeutic Activity 2: 8\" STEP L FOOT TAP X 2 MIN                   OP EDUCATION:  Outpatient Education  Education Comment: CONTINUE WITH CURRENT HEP    Assessment:  PT Assessment  Assessment Comment: " PT LEYLA EX'S WELL.  SHE WAS TIGHTER IN HER KNEE TODAY.  NEEDS TO INCREASE THE TIME / AMOUNT OF HEEL SLIDES SHE DOES DURING THE DAY. PT STILL HAS EXT LAG WITH SLR.    Plan:  OP PT Plan  Treatment/Interventions: Education/ Instruction, Manual therapy, Neuromuscular re-education, Self care/ home management, Therapeutic activities, Therapeutic exercises  PT Plan: Skilled PT  PT Frequency: 2 times per week  Duration: 8 weeks  Onset Date: 06/09/25  Certification Period Start Date: 07/21/25  Certification Period End Date: 09/30/25  Number of Treatments Authorized: 10/16   (HAS 30 V A YR)  Rehab Potential: Good  Plan of Care Agreement: Patient    Goals:  Active       PT Problem       PT Goal 1       Start:  07/03/25    Expected End:  09/30/25       STG  1) Patient will improve LEFS score by 9 points in order to perform functional activities at home and in the community in 4 weeks.  2) Patient will be able to complete ADLs with pain in knee less than 3/10 in 4 weeks.  3) Pt will improve knee Flex ROM to 100 degrees to be able to complete ADLs with less difficulty in 4 weeks.   4) Patient will be independent with HEP to allow for continued improvement in daily tasks at home and in the community in 3 visits.   5) Patient will perform 5 x sit to  less than 16 seconds without UE assist in 4 weeks for ease of transfer and to demonstrate increased LE strength.   LTG  1) Patient will have >/=4+/5 strength in hip musculature to aid in balance with ambulation on varied surfaces in community in 8 weeks  2) Patient will be able to perform proper squatting technique in order to perform lifting requirements at home and work in 8 weeks.  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    4) Patient will improve LEFS score >/=65/80 points in order  to perform functional activities at home and in the community by discharge.   5) Patient will demonstrate reciprocal stair climbing with light rail use for home and community access in 8 weeks.               Time Calculation  Start Time: 1045  Stop Time: 1128  Time Calculation (min): 43 min  PT Therapeutic Procedures Time Entry  Therapeutic Exercise Time Entry: 31  Therapeutic Activity Time Entry: 12,

## 2025-08-11 ENCOUNTER — TREATMENT (OUTPATIENT)
Dept: PHYSICAL THERAPY | Facility: CLINIC | Age: 55
End: 2025-08-11
Payer: COMMERCIAL

## 2025-08-11 DIAGNOSIS — R26.2 DIFFICULTY WALKING: ICD-10-CM

## 2025-08-11 DIAGNOSIS — Z96.652 S/P TOTAL KNEE ARTHROPLASTY, LEFT: ICD-10-CM

## 2025-08-11 PROCEDURE — 97530 THERAPEUTIC ACTIVITIES: CPT | Mod: GP | Performed by: PHYSICAL THERAPIST

## 2025-08-11 ASSESSMENT — PAIN SCALES - GENERAL: PAINLEVEL_OUTOF10: 6

## 2025-08-11 ASSESSMENT — PAIN - FUNCTIONAL ASSESSMENT: PAIN_FUNCTIONAL_ASSESSMENT: 0-10

## 2025-08-13 ENCOUNTER — TREATMENT (OUTPATIENT)
Dept: PHYSICAL THERAPY | Facility: CLINIC | Age: 55
End: 2025-08-13
Payer: COMMERCIAL

## 2025-08-13 DIAGNOSIS — R26.2 DIFFICULTY WALKING: ICD-10-CM

## 2025-08-13 DIAGNOSIS — Z96.652 S/P TOTAL KNEE ARTHROPLASTY, LEFT: ICD-10-CM

## 2025-08-13 PROCEDURE — 97110 THERAPEUTIC EXERCISES: CPT | Mod: GP | Performed by: PHYSICAL THERAPIST

## 2025-08-13 PROCEDURE — 97140 MANUAL THERAPY 1/> REGIONS: CPT | Mod: GP | Performed by: PHYSICAL THERAPIST

## 2025-08-13 ASSESSMENT — PAIN - FUNCTIONAL ASSESSMENT: PAIN_FUNCTIONAL_ASSESSMENT: 0-10

## 2025-08-13 ASSESSMENT — PAIN SCALES - GENERAL: PAINLEVEL_OUTOF10: 7

## 2025-08-15 ENCOUNTER — HOSPITAL ENCOUNTER (OUTPATIENT)
Dept: RADIOLOGY | Facility: CLINIC | Age: 55
Discharge: HOME | End: 2025-08-15
Payer: COMMERCIAL

## 2025-08-15 ENCOUNTER — OFFICE VISIT (OUTPATIENT)
Dept: ORTHOPEDIC SURGERY | Facility: CLINIC | Age: 55
End: 2025-08-15
Payer: COMMERCIAL

## 2025-08-15 VITALS — BODY MASS INDEX: 29.25 KG/M2 | WEIGHT: 149 LBS | HEIGHT: 60 IN

## 2025-08-15 DIAGNOSIS — Z96.652 S/P TOTAL KNEE ARTHROPLASTY, LEFT: Primary | ICD-10-CM

## 2025-08-15 DIAGNOSIS — Z96.652 S/P TOTAL KNEE ARTHROPLASTY, LEFT: ICD-10-CM

## 2025-08-15 DIAGNOSIS — M25.561 RIGHT KNEE PAIN, UNSPECIFIED CHRONICITY: ICD-10-CM

## 2025-08-15 DIAGNOSIS — S83.429A SPRAIN OF LATERAL COLLATERAL LIGAMENT OF KNEE, INITIAL ENCOUNTER: ICD-10-CM

## 2025-08-15 PROCEDURE — 73562 X-RAY EXAM OF KNEE 3: CPT | Mod: RT

## 2025-08-15 PROCEDURE — 1036F TOBACCO NON-USER: CPT

## 2025-08-15 PROCEDURE — 99213 OFFICE O/P EST LOW 20 MIN: CPT

## 2025-08-15 PROCEDURE — 99024 POSTOP FOLLOW-UP VISIT: CPT

## 2025-08-15 PROCEDURE — 73564 X-RAY EXAM KNEE 4 OR MORE: CPT | Mod: LT

## 2025-08-15 PROCEDURE — 3044F HG A1C LEVEL LT 7.0%: CPT

## 2025-08-15 PROCEDURE — 3008F BODY MASS INDEX DOCD: CPT

## 2025-08-15 ASSESSMENT — PAIN SCALES - GENERAL: PAINLEVEL_OUTOF10: 7

## 2025-08-15 ASSESSMENT — PAIN - FUNCTIONAL ASSESSMENT: PAIN_FUNCTIONAL_ASSESSMENT: 0-10

## 2025-08-18 ENCOUNTER — TREATMENT (OUTPATIENT)
Dept: PHYSICAL THERAPY | Facility: CLINIC | Age: 55
End: 2025-08-18
Payer: COMMERCIAL

## 2025-08-18 DIAGNOSIS — Z96.652 S/P TOTAL KNEE ARTHROPLASTY, LEFT: ICD-10-CM

## 2025-08-18 DIAGNOSIS — R26.2 DIFFICULTY WALKING: ICD-10-CM

## 2025-08-18 PROCEDURE — 97530 THERAPEUTIC ACTIVITIES: CPT | Mod: GP | Performed by: PHYSICAL THERAPIST

## 2025-08-18 PROCEDURE — 97110 THERAPEUTIC EXERCISES: CPT | Mod: GP | Performed by: PHYSICAL THERAPIST

## 2025-08-18 ASSESSMENT — PAIN SCALES - GENERAL: PAINLEVEL_OUTOF10: 6

## 2025-08-18 ASSESSMENT — PAIN - FUNCTIONAL ASSESSMENT: PAIN_FUNCTIONAL_ASSESSMENT: 0-10

## 2025-08-20 ENCOUNTER — TREATMENT (OUTPATIENT)
Dept: PHYSICAL THERAPY | Facility: CLINIC | Age: 55
End: 2025-08-20
Payer: COMMERCIAL

## 2025-08-20 ENCOUNTER — APPOINTMENT (OUTPATIENT)
Dept: RADIOLOGY | Facility: CLINIC | Age: 55
End: 2025-08-20
Payer: COMMERCIAL

## 2025-08-20 DIAGNOSIS — R26.2 DIFFICULTY WALKING: ICD-10-CM

## 2025-08-20 DIAGNOSIS — E04.1 THYROID NODULE: ICD-10-CM

## 2025-08-20 DIAGNOSIS — Z96.652 S/P TOTAL KNEE ARTHROPLASTY, LEFT: ICD-10-CM

## 2025-08-20 PROCEDURE — 97110 THERAPEUTIC EXERCISES: CPT | Mod: GP | Performed by: PHYSICAL THERAPIST

## 2025-08-20 PROCEDURE — 76536 US EXAM OF HEAD AND NECK: CPT

## 2025-08-20 PROCEDURE — 76536 US EXAM OF HEAD AND NECK: CPT | Performed by: RADIOLOGY

## 2025-08-20 PROCEDURE — 97530 THERAPEUTIC ACTIVITIES: CPT | Mod: GP | Performed by: PHYSICAL THERAPIST

## 2025-08-20 ASSESSMENT — PAIN - FUNCTIONAL ASSESSMENT: PAIN_FUNCTIONAL_ASSESSMENT: 0-10

## 2025-08-20 ASSESSMENT — PAIN SCALES - GENERAL: PAINLEVEL_OUTOF10: 5 - MODERATE PAIN

## 2025-08-26 ENCOUNTER — APPOINTMENT (OUTPATIENT)
Dept: PHYSICAL THERAPY | Facility: CLINIC | Age: 55
End: 2025-08-26
Payer: COMMERCIAL

## 2025-08-26 DIAGNOSIS — Z96.652 S/P TOTAL KNEE ARTHROPLASTY, LEFT: ICD-10-CM

## 2025-08-29 ENCOUNTER — TREATMENT (OUTPATIENT)
Dept: PHYSICAL THERAPY | Facility: CLINIC | Age: 55
End: 2025-08-29
Payer: COMMERCIAL

## 2025-08-29 ENCOUNTER — APPOINTMENT (OUTPATIENT)
Dept: ORTHOPEDIC SURGERY | Facility: CLINIC | Age: 55
End: 2025-08-29
Payer: COMMERCIAL

## 2025-08-29 ENCOUNTER — OFFICE VISIT (OUTPATIENT)
Dept: ORTHOPEDIC SURGERY | Facility: CLINIC | Age: 55
End: 2025-08-29
Payer: COMMERCIAL

## 2025-08-29 ENCOUNTER — HOSPITAL ENCOUNTER (OUTPATIENT)
Dept: RADIOLOGY | Facility: HOSPITAL | Age: 55
Discharge: HOME | End: 2025-08-29
Payer: COMMERCIAL

## 2025-08-29 DIAGNOSIS — Z96.652 S/P TOTAL KNEE ARTHROPLASTY, LEFT: ICD-10-CM

## 2025-08-29 DIAGNOSIS — R26.2 DIFFICULTY WALKING: ICD-10-CM

## 2025-08-29 PROCEDURE — 1036F TOBACCO NON-USER: CPT

## 2025-08-29 PROCEDURE — 3044F HG A1C LEVEL LT 7.0%: CPT

## 2025-08-29 PROCEDURE — 73562 X-RAY EXAM OF KNEE 3: CPT | Mod: RT

## 2025-08-29 PROCEDURE — 97110 THERAPEUTIC EXERCISES: CPT | Mod: GP,CQ

## 2025-08-29 PROCEDURE — 99024 POSTOP FOLLOW-UP VISIT: CPT

## 2025-08-29 PROCEDURE — 73564 X-RAY EXAM KNEE 4 OR MORE: CPT | Mod: LT

## 2025-08-29 ASSESSMENT — PAIN SCALES - GENERAL: PAINLEVEL_OUTOF10: 7

## 2025-08-29 ASSESSMENT — PAIN - FUNCTIONAL ASSESSMENT: PAIN_FUNCTIONAL_ASSESSMENT: 0-10

## 2025-09-04 ENCOUNTER — TREATMENT (OUTPATIENT)
Dept: PHYSICAL THERAPY | Facility: CLINIC | Age: 55
End: 2025-09-04
Payer: COMMERCIAL

## 2025-09-04 DIAGNOSIS — R26.2 DIFFICULTY WALKING: ICD-10-CM

## 2025-09-04 DIAGNOSIS — Z96.652 S/P TOTAL KNEE ARTHROPLASTY, LEFT: ICD-10-CM

## 2025-09-04 PROCEDURE — 97110 THERAPEUTIC EXERCISES: CPT | Mod: GP,CQ

## 2026-03-27 ENCOUNTER — APPOINTMENT (OUTPATIENT)
Dept: ORTHOPEDIC SURGERY | Facility: CLINIC | Age: 56
End: 2026-03-27
Payer: COMMERCIAL

## (undated) DEVICE — CUFF, TOURNIQUET, 30 X 4, SNGL PORT/SNGL BLADDER, DISP, LF

## (undated) DEVICE — BLADE, OSCILLATOR 19.5 X 86 X 1.27MM

## (undated) DEVICE — TIP, SUCTION, YANKAUER, FLEXIBLE

## (undated) DEVICE — IRRIGATION SYSTEM, WOUND, PULSAVAC PLUS

## (undated) DEVICE — SOLUTION, IRRIGATION, USP, 0.9% SODIUM CHL, 3000ML, TITAN XL

## (undated) DEVICE — KIT, MINOR, DOUBLE BASIN

## (undated) DEVICE — CATHETER TRAY, SURESTEP, 14FR, PRECONNECTED DRAIN BAG

## (undated) DEVICE — APPLICATOR, CHLORAPREP, W/ORANGE TINT, 26ML

## (undated) DEVICE — COVER, TABLE, 44X90

## (undated) DEVICE — BANDAGE, ELASTIC,  6 IN X 11 YDS, STERILE, LF

## (undated) DEVICE — SYRINGE, 30 CC, LUER LOCK

## (undated) DEVICE — DRESSING, MEPILEX BORDER, POST-OP AG, 4 X 10 IN

## (undated) DEVICE — DRAPE PACK, TOTAL KNEE, CUSTOM, GEAUGA

## (undated) DEVICE — PAD, KNEE PATIENT, DEMAYO, DISP, STERILE

## (undated) DEVICE — DRAPE, SHEET, 17 X 23 IN

## (undated) DEVICE — SUTURE, STRATAFIX, 4-0, MONOCRYL PLUS, 27IN PS-2 70CM, UNDYED

## (undated) DEVICE — CLOSURE SYSTEM, DERMABOND, PRINEO, 22CM, STERILE

## (undated) DEVICE — BLADE, RECIPROCATOR 12.5 X 76 X 0.9MM

## (undated) DEVICE — NEEDLE, SPINAL, QUINCKE, 18 G X 3.5 IN, PINK HUB

## (undated) DEVICE — HOOD, SURGICAL, FLYTE, T7 PLUS, PEEL AWAY SHIELD

## (undated) DEVICE — SUTURE, VICRYL, 1, 24 IN, CTD, UNDYED

## (undated) DEVICE — IRRIGATION SYSTEM, WOUND, SURGIPHOR, 450ML, STERILE

## (undated) DEVICE — SUTURE, STRATAFIX, SPIRAL PDS PLUS, 1-0, 9IN, 24CM, CT-1, VIOLET

## (undated) DEVICE — SUTURE, STRATAFIX, 3-0 MONOCRYL PLUS, PS-2 SPIRAL UNDYED

## (undated) DEVICE — DRESSING, GAUZE, SPONGE, KERLIX, SUPER, 6 X 6.75 IN, STERILE 10PK

## (undated) DEVICE — SUTURE, CTD, VICRYL, 2-0, UND, BR, CT-2